# Patient Record
Sex: MALE | Race: WHITE | NOT HISPANIC OR LATINO | Employment: OTHER | URBAN - METROPOLITAN AREA
[De-identification: names, ages, dates, MRNs, and addresses within clinical notes are randomized per-mention and may not be internally consistent; named-entity substitution may affect disease eponyms.]

---

## 2019-10-08 RX ORDER — DULOXETIN HYDROCHLORIDE 60 MG/1
60 CAPSULE, DELAYED RELEASE ORAL DAILY
COMMUNITY
End: 2019-10-08 | Stop reason: SDUPTHER

## 2019-10-08 RX ORDER — ALLOPURINOL 100 MG/1
100 TABLET ORAL DAILY
COMMUNITY
End: 2019-10-08 | Stop reason: SDUPTHER

## 2019-10-08 RX ORDER — DULOXETIN HYDROCHLORIDE 60 MG/1
60 CAPSULE, DELAYED RELEASE ORAL DAILY
Qty: 90 CAPSULE | Refills: 0 | Status: SHIPPED | OUTPATIENT
Start: 2019-10-08 | End: 2019-11-15 | Stop reason: SDUPTHER

## 2019-10-08 RX ORDER — EPLERENONE 50 MG/1
25 TABLET, FILM COATED ORAL 2 TIMES DAILY
COMMUNITY
End: 2019-10-08 | Stop reason: SDUPTHER

## 2019-10-08 RX ORDER — EPLERENONE 50 MG/1
50 TABLET, FILM COATED ORAL 2 TIMES DAILY
Qty: 180 TABLET | Refills: 0 | Status: SHIPPED | OUTPATIENT
Start: 2019-10-08 | End: 2020-01-15 | Stop reason: SDUPTHER

## 2019-10-08 RX ORDER — ALLOPURINOL 100 MG/1
100 TABLET ORAL DAILY
Qty: 90 TABLET | Refills: 0 | Status: SHIPPED | OUTPATIENT
Start: 2019-10-08 | End: 2020-01-15 | Stop reason: SDUPTHER

## 2019-10-08 NOTE — TELEPHONE ENCOUNTER
----- Message from Marcia Flowers sent at 10/8/2019  3:53 PM CDT -----  Contact: Michael Rashidill eplerone 50 mg, cymbalta, and allopurinol. [facility] [address] in [place] .The patient is out of his medications  pts # [phone] GH

## 2019-11-14 PROBLEM — G47.33 OSA (OBSTRUCTIVE SLEEP APNEA): Status: ACTIVE | Noted: 2019-11-14

## 2019-11-14 PROBLEM — Z12.11 SPECIAL SCREENING FOR MALIGNANT NEOPLASMS, COLON: Status: ACTIVE | Noted: 2019-11-14

## 2019-11-14 PROBLEM — M50.90 CERVICAL DISC DISEASE: Status: ACTIVE | Noted: 2019-11-14

## 2019-11-14 PROBLEM — N40.1 BPH WITH OBSTRUCTION/LOWER URINARY TRACT SYMPTOMS: Status: ACTIVE | Noted: 2019-11-14

## 2019-11-14 PROBLEM — I10 ESSENTIAL HYPERTENSION: Status: ACTIVE | Noted: 2019-11-14

## 2019-11-14 PROBLEM — G60.9 IDIOPATHIC PERIPHERAL NEUROPATHY: Status: ACTIVE | Noted: 2019-11-14

## 2019-11-14 PROBLEM — E78.00 PURE HYPERCHOLESTEROLEMIA: Status: ACTIVE | Noted: 2019-11-14

## 2019-11-14 PROBLEM — N13.8 BPH WITH OBSTRUCTION/LOWER URINARY TRACT SYMPTOMS: Status: ACTIVE | Noted: 2019-11-14

## 2019-11-14 PROBLEM — M51.36 DEGENERATION OF LUMBAR INTERVERTEBRAL DISC: Status: ACTIVE | Noted: 2019-11-14

## 2019-11-15 ENCOUNTER — OFFICE VISIT (OUTPATIENT)
Dept: FAMILY MEDICINE | Facility: CLINIC | Age: 69
End: 2019-11-15
Payer: MEDICARE

## 2019-11-15 VITALS
SYSTOLIC BLOOD PRESSURE: 128 MMHG | DIASTOLIC BLOOD PRESSURE: 72 MMHG | HEART RATE: 68 BPM | WEIGHT: 214 LBS | BODY MASS INDEX: 31.7 KG/M2 | HEIGHT: 69 IN

## 2019-11-15 DIAGNOSIS — H04.123 DRY EYE SYNDROME OF BOTH EYES: ICD-10-CM

## 2019-11-15 DIAGNOSIS — Z23 NEED FOR IMMUNIZATION AGAINST INFLUENZA: ICD-10-CM

## 2019-11-15 DIAGNOSIS — M50.90 CERVICAL DISC DISEASE: ICD-10-CM

## 2019-11-15 DIAGNOSIS — E27.40 HYPOALDOSTERONISM: ICD-10-CM

## 2019-11-15 DIAGNOSIS — G60.9 IDIOPATHIC PERIPHERAL NEUROPATHY: ICD-10-CM

## 2019-11-15 DIAGNOSIS — E78.00 PURE HYPERCHOLESTEROLEMIA: ICD-10-CM

## 2019-11-15 DIAGNOSIS — G47.33 OSA (OBSTRUCTIVE SLEEP APNEA): ICD-10-CM

## 2019-11-15 DIAGNOSIS — Z12.11 SPECIAL SCREENING FOR MALIGNANT NEOPLASMS, COLON: ICD-10-CM

## 2019-11-15 DIAGNOSIS — I10 ESSENTIAL HYPERTENSION: ICD-10-CM

## 2019-11-15 DIAGNOSIS — J30.1 NON-SEASONAL ALLERGIC RHINITIS DUE TO POLLEN: ICD-10-CM

## 2019-11-15 DIAGNOSIS — M51.36 DEGENERATION OF LUMBAR INTERVERTEBRAL DISC: ICD-10-CM

## 2019-11-15 DIAGNOSIS — E03.9 ACQUIRED HYPOTHYROIDISM: ICD-10-CM

## 2019-11-15 DIAGNOSIS — N40.1 BPH WITH OBSTRUCTION/LOWER URINARY TRACT SYMPTOMS: ICD-10-CM

## 2019-11-15 DIAGNOSIS — N13.8 BPH WITH OBSTRUCTION/LOWER URINARY TRACT SYMPTOMS: ICD-10-CM

## 2019-11-15 DIAGNOSIS — S06.5X0S: Primary | ICD-10-CM

## 2019-11-15 PROBLEM — S06.5XAA SUBDURAL HEMATOMA WITHOUT COMA: Status: ACTIVE | Noted: 2019-11-15

## 2019-11-15 PROCEDURE — 99214 OFFICE O/P EST MOD 30 MIN: CPT | Mod: 25,S$GLB,, | Performed by: FAMILY MEDICINE

## 2019-11-15 PROCEDURE — G0008 FLU VACCINE - HIGH DOSE (65+) PRESERVATIVE FREE IM: ICD-10-PCS | Mod: S$GLB,,, | Performed by: FAMILY MEDICINE

## 2019-11-15 PROCEDURE — G0008 ADMIN INFLUENZA VIRUS VAC: HCPCS | Mod: S$GLB,,, | Performed by: FAMILY MEDICINE

## 2019-11-15 PROCEDURE — 90662 IIV NO PRSV INCREASED AG IM: CPT | Mod: S$GLB,,, | Performed by: FAMILY MEDICINE

## 2019-11-15 PROCEDURE — 90662 FLU VACCINE - HIGH DOSE (65+) PRESERVATIVE FREE IM: ICD-10-PCS | Mod: S$GLB,,, | Performed by: FAMILY MEDICINE

## 2019-11-15 PROCEDURE — 99214 PR OFFICE/OUTPT VISIT, EST, LEVL IV, 30-39 MIN: ICD-10-PCS | Mod: 25,S$GLB,, | Performed by: FAMILY MEDICINE

## 2019-11-15 RX ORDER — LEVOTHYROXINE SODIUM 25 UG/1
25 TABLET ORAL DAILY
Qty: 90 TABLET | Refills: 1 | Status: SHIPPED | OUTPATIENT
Start: 2019-11-15 | End: 2020-01-15 | Stop reason: SDUPTHER

## 2019-11-15 RX ORDER — FINASTERIDE 5 MG/1
5 TABLET, FILM COATED ORAL DAILY
COMMUNITY
End: 2019-11-15 | Stop reason: SDUPTHER

## 2019-11-15 RX ORDER — POTASSIUM CHLORIDE 750 MG/1
10 TABLET, EXTENDED RELEASE ORAL 2 TIMES DAILY
COMMUNITY
End: 2020-01-10 | Stop reason: SDUPTHER

## 2019-11-15 RX ORDER — LUBIPROSTONE 24 UG/1
24 CAPSULE ORAL 2 TIMES DAILY WITH MEALS
COMMUNITY
End: 2020-01-01

## 2019-11-15 RX ORDER — LEVOTHYROXINE SODIUM 25 UG/1
25 TABLET ORAL DAILY
COMMUNITY
End: 2019-11-15 | Stop reason: SDUPTHER

## 2019-11-15 RX ORDER — LISINOPRIL AND HYDROCHLOROTHIAZIDE 20; 25 MG/1; MG/1
1 TABLET ORAL DAILY
COMMUNITY
End: 2020-01-15 | Stop reason: SDUPTHER

## 2019-11-15 RX ORDER — CYCLOSPORINE 0.5 MG/ML
1 EMULSION OPHTHALMIC 2 TIMES DAILY
COMMUNITY
End: 2019-11-15 | Stop reason: SDUPTHER

## 2019-11-15 RX ORDER — FLUTICASONE PROPIONATE 50 MCG
1 SPRAY, SUSPENSION (ML) NASAL DAILY
Qty: 3 BOTTLE | Refills: 2 | Status: SHIPPED | OUTPATIENT
Start: 2019-11-15 | End: 2020-01-01 | Stop reason: SDUPTHER

## 2019-11-15 RX ORDER — CYCLOSPORINE 0.5 MG/ML
1 EMULSION OPHTHALMIC 2 TIMES DAILY
Qty: 30 EACH | Refills: 5 | Status: SHIPPED | OUTPATIENT
Start: 2019-11-15 | End: 2020-01-15 | Stop reason: SDUPTHER

## 2019-11-15 RX ORDER — EZETIMIBE 10 MG/1
10 TABLET ORAL DAILY
Qty: 90 TABLET | Refills: 1 | Status: SHIPPED | OUTPATIENT
Start: 2019-11-15 | End: 2020-01-15 | Stop reason: SDUPTHER

## 2019-11-15 RX ORDER — EZETIMIBE 10 MG/1
10 TABLET ORAL DAILY
COMMUNITY
End: 2019-11-15 | Stop reason: SDUPTHER

## 2019-11-15 RX ORDER — HYDRALAZINE HYDROCHLORIDE 50 MG/1
50 TABLET, FILM COATED ORAL 2 TIMES DAILY
COMMUNITY
End: 2020-01-15 | Stop reason: SDUPTHER

## 2019-11-15 RX ORDER — FINASTERIDE 5 MG/1
5 TABLET, FILM COATED ORAL DAILY
Qty: 90 TABLET | Refills: 1 | Status: SHIPPED | OUTPATIENT
Start: 2019-11-15 | End: 2020-05-29 | Stop reason: SDUPTHER

## 2019-11-15 RX ORDER — FLUTICASONE PROPIONATE 50 MCG
1 SPRAY, SUSPENSION (ML) NASAL DAILY
COMMUNITY
End: 2019-11-15 | Stop reason: SDUPTHER

## 2019-11-15 RX ORDER — INDOMETHACIN 25 MG/1
25 CAPSULE ORAL 2 TIMES DAILY PRN
COMMUNITY
End: 2020-01-01 | Stop reason: SDUPTHER

## 2019-11-15 RX ORDER — CLINDAMYCIN HYDROCHLORIDE 300 MG/1
300 CAPSULE ORAL 3 TIMES DAILY
COMMUNITY
End: 2020-01-01

## 2019-11-15 RX ORDER — DULOXETIN HYDROCHLORIDE 60 MG/1
60 CAPSULE, DELAYED RELEASE ORAL DAILY
Qty: 90 CAPSULE | Refills: 0 | Status: SHIPPED | OUTPATIENT
Start: 2019-11-15 | End: 2020-01-15 | Stop reason: SDUPTHER
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
## 2019-12-04 ENCOUNTER — TELEPHONE (OUTPATIENT)
Dept: FAMILY MEDICINE | Facility: CLINIC | Age: 69
End: 2019-12-04

## 2019-12-04 NOTE — TELEPHONE ENCOUNTER
----- Message from Marcia Flowers sent at 12/4/2019  3:01 PM CST -----  Contact: [facility] WIFE   Dr. Manriquez the patients pain  Told the patient to have his PCP fill out his handi cap form. pts # [phone] GH

## 2020-01-01 ENCOUNTER — INFUSION (OUTPATIENT)
Dept: INFUSION THERAPY | Facility: HOSPITAL | Age: 70
End: 2020-01-01
Attending: INTERNAL MEDICINE
Payer: MEDICARE

## 2020-01-01 ENCOUNTER — TELEPHONE (OUTPATIENT)
Dept: HEMATOLOGY/ONCOLOGY | Facility: CLINIC | Age: 70
End: 2020-01-01

## 2020-01-01 ENCOUNTER — OFFICE VISIT (OUTPATIENT)
Dept: HEMATOLOGY/ONCOLOGY | Facility: CLINIC | Age: 70
End: 2020-01-01
Payer: MEDICARE

## 2020-01-01 ENCOUNTER — HOSPITAL ENCOUNTER (OUTPATIENT)
Dept: RADIOLOGY | Facility: HOSPITAL | Age: 70
Discharge: HOME | End: 2020-10-05
Attending: NURSE PRACTITIONER
Payer: MEDICARE

## 2020-01-01 ENCOUNTER — HOSPITAL ENCOUNTER (OUTPATIENT)
Facility: HOSPITAL | Age: 70
Discharge: HOME | End: 2020-10-12
Attending: INTERNAL MEDICINE | Admitting: INTERNAL MEDICINE
Payer: MEDICARE

## 2020-01-01 ENCOUNTER — OFFICE VISIT (OUTPATIENT)
Dept: RADIATION ONCOLOGY | Facility: CLINIC | Age: 70
End: 2020-01-01
Payer: MEDICARE

## 2020-01-01 ENCOUNTER — TELEPHONE (OUTPATIENT)
Dept: SURGERY | Facility: CLINIC | Age: 70
End: 2020-01-01

## 2020-01-01 ENCOUNTER — APPOINTMENT (OUTPATIENT)
Dept: INFUSION THERAPY | Facility: HOSPITAL | Age: 70
End: 2020-01-01
Attending: NURSE PRACTITIONER
Payer: MEDICARE

## 2020-01-01 ENCOUNTER — OFFICE VISIT (OUTPATIENT)
Dept: FAMILY MEDICINE | Facility: CLINIC | Age: 70
End: 2020-01-01
Payer: MEDICARE

## 2020-01-01 ENCOUNTER — ANESTHESIA EVENT (OUTPATIENT)
Dept: SURGERY | Facility: HOSPITAL | Age: 70
End: 2020-01-01
Payer: MEDICARE

## 2020-01-01 ENCOUNTER — TELEPHONE (OUTPATIENT)
Dept: INFUSION THERAPY | Facility: HOSPITAL | Age: 70
End: 2020-01-01

## 2020-01-01 ENCOUNTER — HOSPITAL ENCOUNTER (OUTPATIENT)
Dept: RADIOLOGY | Facility: HOSPITAL | Age: 70
Discharge: HOME | End: 2020-10-08
Attending: NURSE PRACTITIONER
Payer: MEDICARE

## 2020-01-01 ENCOUNTER — PATIENT MESSAGE (OUTPATIENT)
Dept: HEMATOLOGY/ONCOLOGY | Facility: CLINIC | Age: 70
End: 2020-01-01

## 2020-01-01 ENCOUNTER — SOCIAL WORK (OUTPATIENT)
Dept: HEMATOLOGY/ONCOLOGY | Facility: CLINIC | Age: 70
End: 2020-01-01

## 2020-01-01 ENCOUNTER — HOSPITAL ENCOUNTER (OUTPATIENT)
Facility: HOSPITAL | Age: 70
Discharge: HOME | End: 2020-09-17
Attending: INTERNAL MEDICINE | Admitting: INTERNAL MEDICINE
Payer: MEDICARE

## 2020-01-01 ENCOUNTER — DOCUMENTATION ONLY (OUTPATIENT)
Dept: HEMATOLOGY/ONCOLOGY | Facility: CLINIC | Age: 70
End: 2020-01-01

## 2020-01-01 ENCOUNTER — HOSPITAL ENCOUNTER (OUTPATIENT)
Dept: PREADMISSION TESTING | Facility: HOSPITAL | Age: 70
Discharge: HOME | End: 2020-10-09
Attending: INTERNAL MEDICINE
Payer: MEDICARE

## 2020-01-01 ENCOUNTER — ANESTHESIA (OUTPATIENT)
Dept: SURGERY | Facility: HOSPITAL | Age: 70
End: 2020-01-01
Payer: MEDICARE

## 2020-01-01 ENCOUNTER — HOSPITAL ENCOUNTER (OUTPATIENT)
Dept: PREADMISSION TESTING | Facility: HOSPITAL | Age: 70
Discharge: HOME | End: 2020-09-16
Attending: INTERNAL MEDICINE
Payer: MEDICARE

## 2020-01-01 ENCOUNTER — TELEPHONE (OUTPATIENT)
Dept: FAMILY MEDICINE | Facility: CLINIC | Age: 70
End: 2020-01-01

## 2020-01-01 ENCOUNTER — CLINICAL SUPPORT (OUTPATIENT)
Dept: HEMATOLOGY/ONCOLOGY | Facility: CLINIC | Age: 70
End: 2020-01-01
Payer: MEDICARE

## 2020-01-01 ENCOUNTER — INITIAL CONSULT (OUTPATIENT)
Dept: SURGERY | Facility: CLINIC | Age: 70
End: 2020-01-01
Payer: MEDICARE

## 2020-01-01 VITALS
SYSTOLIC BLOOD PRESSURE: 159 MMHG | RESPIRATION RATE: 17 BRPM | HEART RATE: 67 BPM | BODY MASS INDEX: 28.56 KG/M2 | HEIGHT: 69 IN | TEMPERATURE: 98 F | TEMPERATURE: 99 F | WEIGHT: 192.81 LBS | HEART RATE: 87 BPM | SYSTOLIC BLOOD PRESSURE: 138 MMHG | BODY MASS INDEX: 27.22 KG/M2 | DIASTOLIC BLOOD PRESSURE: 77 MMHG | HEIGHT: 69 IN | TEMPERATURE: 98 F | RESPIRATION RATE: 18 BRPM | WEIGHT: 188.31 LBS | WEIGHT: 183.81 LBS | DIASTOLIC BLOOD PRESSURE: 67 MMHG | DIASTOLIC BLOOD PRESSURE: 80 MMHG | SYSTOLIC BLOOD PRESSURE: 130 MMHG | HEIGHT: 69 IN | BODY MASS INDEX: 27.89 KG/M2 | OXYGEN SATURATION: 97 % | HEART RATE: 89 BPM

## 2020-01-01 VITALS
HEART RATE: 89 BPM | DIASTOLIC BLOOD PRESSURE: 92 MMHG | SYSTOLIC BLOOD PRESSURE: 147 MMHG | RESPIRATION RATE: 18 BRPM | OXYGEN SATURATION: 95 % | TEMPERATURE: 97 F

## 2020-01-01 VITALS
DIASTOLIC BLOOD PRESSURE: 86 MMHG | HEART RATE: 75 BPM | WEIGHT: 192.31 LBS | TEMPERATURE: 98 F | BODY MASS INDEX: 28.81 KG/M2 | SYSTOLIC BLOOD PRESSURE: 149 MMHG

## 2020-01-01 VITALS
WEIGHT: 186.5 LBS | HEART RATE: 102 BPM | BODY MASS INDEX: 27.54 KG/M2 | DIASTOLIC BLOOD PRESSURE: 69 MMHG | SYSTOLIC BLOOD PRESSURE: 124 MMHG | TEMPERATURE: 97 F

## 2020-01-01 VITALS
RESPIRATION RATE: 16 BRPM | TEMPERATURE: 98 F | OXYGEN SATURATION: 97 % | WEIGHT: 186.31 LBS | BODY MASS INDEX: 27.51 KG/M2 | HEART RATE: 75 BPM | SYSTOLIC BLOOD PRESSURE: 139 MMHG | DIASTOLIC BLOOD PRESSURE: 79 MMHG

## 2020-01-01 VITALS
TEMPERATURE: 98 F | SYSTOLIC BLOOD PRESSURE: 149 MMHG | OXYGEN SATURATION: 97 % | SYSTOLIC BLOOD PRESSURE: 168 MMHG | RESPIRATION RATE: 19 BRPM | BODY MASS INDEX: 28.06 KG/M2 | HEART RATE: 85 BPM | HEART RATE: 81 BPM | OXYGEN SATURATION: 98 % | DIASTOLIC BLOOD PRESSURE: 80 MMHG | WEIGHT: 190 LBS | WEIGHT: 190 LBS | BODY MASS INDEX: 28.06 KG/M2 | RESPIRATION RATE: 18 BRPM | TEMPERATURE: 99 F | DIASTOLIC BLOOD PRESSURE: 81 MMHG

## 2020-01-01 VITALS
DIASTOLIC BLOOD PRESSURE: 80 MMHG | RESPIRATION RATE: 18 BRPM | HEIGHT: 69 IN | OXYGEN SATURATION: 97 % | SYSTOLIC BLOOD PRESSURE: 138 MMHG | DIASTOLIC BLOOD PRESSURE: 84 MMHG | WEIGHT: 189.63 LBS | BODY MASS INDEX: 28.08 KG/M2 | HEART RATE: 84 BPM | HEART RATE: 76 BPM | BODY MASS INDEX: 29.77 KG/M2 | TEMPERATURE: 97 F | SYSTOLIC BLOOD PRESSURE: 158 MMHG | HEIGHT: 69 IN | WEIGHT: 201 LBS

## 2020-01-01 VITALS
TEMPERATURE: 98 F | BODY MASS INDEX: 28.14 KG/M2 | WEIGHT: 188.5 LBS | HEART RATE: 47 BPM | DIASTOLIC BLOOD PRESSURE: 84 MMHG | WEIGHT: 190 LBS | SYSTOLIC BLOOD PRESSURE: 148 MMHG | HEIGHT: 69 IN | HEIGHT: 69 IN | RESPIRATION RATE: 18 BRPM | TEMPERATURE: 98 F | BODY MASS INDEX: 27.92 KG/M2 | DIASTOLIC BLOOD PRESSURE: 59 MMHG | SYSTOLIC BLOOD PRESSURE: 143 MMHG | HEART RATE: 84 BPM

## 2020-01-01 VITALS
DIASTOLIC BLOOD PRESSURE: 72 MMHG | WEIGHT: 189.63 LBS | RESPIRATION RATE: 18 BRPM | HEART RATE: 98 BPM | BODY MASS INDEX: 28.08 KG/M2 | SYSTOLIC BLOOD PRESSURE: 124 MMHG | OXYGEN SATURATION: 96 % | HEIGHT: 69 IN | TEMPERATURE: 99 F

## 2020-01-01 VITALS
WEIGHT: 192 LBS | RESPIRATION RATE: 18 BRPM | TEMPERATURE: 98 F | OXYGEN SATURATION: 95 % | SYSTOLIC BLOOD PRESSURE: 133 MMHG | DIASTOLIC BLOOD PRESSURE: 76 MMHG | BODY MASS INDEX: 28.77 KG/M2 | HEART RATE: 99 BPM

## 2020-01-01 VITALS
HEIGHT: 69 IN | WEIGHT: 186.31 LBS | WEIGHT: 189.13 LBS | HEART RATE: 74 BPM | TEMPERATURE: 97 F | DIASTOLIC BLOOD PRESSURE: 88 MMHG | OXYGEN SATURATION: 97 % | BODY MASS INDEX: 28.01 KG/M2 | RESPIRATION RATE: 17 BRPM | HEART RATE: 82 BPM | DIASTOLIC BLOOD PRESSURE: 83 MMHG | RESPIRATION RATE: 16 BRPM | TEMPERATURE: 98 F | OXYGEN SATURATION: 97 % | WEIGHT: 176.69 LBS | HEART RATE: 75 BPM | SYSTOLIC BLOOD PRESSURE: 179 MMHG | SYSTOLIC BLOOD PRESSURE: 139 MMHG | BODY MASS INDEX: 27.6 KG/M2 | DIASTOLIC BLOOD PRESSURE: 79 MMHG | RESPIRATION RATE: 17 BRPM | BODY MASS INDEX: 26.17 KG/M2 | TEMPERATURE: 98 F | HEIGHT: 69 IN | HEIGHT: 69 IN | SYSTOLIC BLOOD PRESSURE: 159 MMHG

## 2020-01-01 VITALS
WEIGHT: 191.31 LBS | SYSTOLIC BLOOD PRESSURE: 124 MMHG | TEMPERATURE: 97 F | DIASTOLIC BLOOD PRESSURE: 78 MMHG | RESPIRATION RATE: 18 BRPM | BODY MASS INDEX: 29.09 KG/M2 | HEART RATE: 98 BPM

## 2020-01-01 VITALS
HEIGHT: 69 IN | SYSTOLIC BLOOD PRESSURE: 120 MMHG | HEIGHT: 69 IN | HEART RATE: 103 BPM | WEIGHT: 182.81 LBS | RESPIRATION RATE: 20 BRPM | WEIGHT: 182.44 LBS | OXYGEN SATURATION: 96 % | DIASTOLIC BLOOD PRESSURE: 62 MMHG | DIASTOLIC BLOOD PRESSURE: 66 MMHG | BODY MASS INDEX: 27.02 KG/M2 | HEART RATE: 89 BPM | TEMPERATURE: 98 F | RESPIRATION RATE: 18 BRPM | SYSTOLIC BLOOD PRESSURE: 120 MMHG | TEMPERATURE: 97 F | BODY MASS INDEX: 27.08 KG/M2

## 2020-01-01 VITALS
OXYGEN SATURATION: 97 % | SYSTOLIC BLOOD PRESSURE: 163 MMHG | WEIGHT: 176.81 LBS | BODY MASS INDEX: 26.11 KG/M2 | RESPIRATION RATE: 18 BRPM | TEMPERATURE: 97 F | DIASTOLIC BLOOD PRESSURE: 78 MMHG | HEART RATE: 91 BPM

## 2020-01-01 VITALS
BODY MASS INDEX: 27.94 KG/M2 | TEMPERATURE: 98 F | HEART RATE: 89 BPM | SYSTOLIC BLOOD PRESSURE: 147 MMHG | DIASTOLIC BLOOD PRESSURE: 85 MMHG | WEIGHT: 186.5 LBS

## 2020-01-01 VITALS
BODY MASS INDEX: 27.89 KG/M2 | HEIGHT: 69 IN | SYSTOLIC BLOOD PRESSURE: 166 MMHG | DIASTOLIC BLOOD PRESSURE: 70 MMHG | TEMPERATURE: 98 F | WEIGHT: 188.31 LBS | RESPIRATION RATE: 17 BRPM | HEART RATE: 66 BPM

## 2020-01-01 VITALS
HEART RATE: 88 BPM | WEIGHT: 183.88 LBS | BODY MASS INDEX: 27.15 KG/M2 | TEMPERATURE: 98 F | DIASTOLIC BLOOD PRESSURE: 76 MMHG | RESPIRATION RATE: 18 BRPM | SYSTOLIC BLOOD PRESSURE: 142 MMHG

## 2020-01-01 VITALS
HEART RATE: 86 BPM | RESPIRATION RATE: 18 BRPM | SYSTOLIC BLOOD PRESSURE: 133 MMHG | BODY MASS INDEX: 27.74 KG/M2 | WEIGHT: 187.81 LBS | DIASTOLIC BLOOD PRESSURE: 72 MMHG | TEMPERATURE: 98 F

## 2020-01-01 VITALS
TEMPERATURE: 98 F | WEIGHT: 188.5 LBS | BODY MASS INDEX: 28.24 KG/M2 | DIASTOLIC BLOOD PRESSURE: 81 MMHG | SYSTOLIC BLOOD PRESSURE: 125 MMHG | RESPIRATION RATE: 19 BRPM | HEART RATE: 99 BPM

## 2020-01-01 VITALS
TEMPERATURE: 99 F | HEIGHT: 68 IN | SYSTOLIC BLOOD PRESSURE: 145 MMHG | BODY MASS INDEX: 28.79 KG/M2 | OXYGEN SATURATION: 97 % | DIASTOLIC BLOOD PRESSURE: 80 MMHG | HEART RATE: 67 BPM | RESPIRATION RATE: 18 BRPM | WEIGHT: 190 LBS

## 2020-01-01 VITALS
OXYGEN SATURATION: 95 % | WEIGHT: 184.5 LBS | RESPIRATION RATE: 20 BRPM | BODY MASS INDEX: 27.4 KG/M2 | OXYGEN SATURATION: 95 % | HEIGHT: 69 IN | TEMPERATURE: 98 F | DIASTOLIC BLOOD PRESSURE: 66 MMHG | BODY MASS INDEX: 27.25 KG/M2 | DIASTOLIC BLOOD PRESSURE: 69 MMHG | TEMPERATURE: 98 F | SYSTOLIC BLOOD PRESSURE: 117 MMHG | RESPIRATION RATE: 18 BRPM | SYSTOLIC BLOOD PRESSURE: 115 MMHG | HEART RATE: 100 BPM | HEART RATE: 95 BPM | WEIGHT: 185 LBS

## 2020-01-01 DIAGNOSIS — N28.9 KIDNEY DISEASE WITH FLUID RETENTION: ICD-10-CM

## 2020-01-01 DIAGNOSIS — D70.9 NEUTROPENIA, UNSPECIFIED TYPE: Primary | ICD-10-CM

## 2020-01-01 DIAGNOSIS — C78.7 METASTASIS TO LIVER: Primary | ICD-10-CM

## 2020-01-01 DIAGNOSIS — C25.0 MALIGNANT NEOPLASM OF HEAD OF PANCREAS: ICD-10-CM

## 2020-01-01 DIAGNOSIS — Z23 NEED FOR INFLUENZA VACCINATION: ICD-10-CM

## 2020-01-01 DIAGNOSIS — C78.7 METASTASIS TO LIVER: ICD-10-CM

## 2020-01-01 DIAGNOSIS — T45.1X5A CHEMOTHERAPY INDUCED NEUTROPENIA: ICD-10-CM

## 2020-01-01 DIAGNOSIS — T45.1X5A ANEMIA DUE TO ANTINEOPLASTIC CHEMOTHERAPY: ICD-10-CM

## 2020-01-01 DIAGNOSIS — I10 ESSENTIAL HYPERTENSION: ICD-10-CM

## 2020-01-01 DIAGNOSIS — E26.9 HYPERALDOSTERONISM: ICD-10-CM

## 2020-01-01 DIAGNOSIS — C25.1 PRIMARY ADENOCARCINOMA OF BODY OF PANCREAS: ICD-10-CM

## 2020-01-01 DIAGNOSIS — K59.03 DRUG-INDUCED CONSTIPATION: ICD-10-CM

## 2020-01-01 DIAGNOSIS — N13.8 BPH WITH OBSTRUCTION/LOWER URINARY TRACT SYMPTOMS: ICD-10-CM

## 2020-01-01 DIAGNOSIS — E78.00 PURE HYPERCHOLESTEROLEMIA: ICD-10-CM

## 2020-01-01 DIAGNOSIS — C25.1 MALIGNANT NEOPLASM OF BODY OF PANCREAS: Primary | ICD-10-CM

## 2020-01-01 DIAGNOSIS — D64.81 ANEMIA DUE TO ANTINEOPLASTIC CHEMOTHERAPY: ICD-10-CM

## 2020-01-01 DIAGNOSIS — Z86.79 HISTORY OF SUBDURAL HEMATOMA: ICD-10-CM

## 2020-01-01 DIAGNOSIS — C25.1 PRIMARY ADENOCARCINOMA OF BODY OF PANCREAS: Primary | ICD-10-CM

## 2020-01-01 DIAGNOSIS — Z01.818 OTHER SPECIFIED PRE-OPERATIVE EXAMINATION: ICD-10-CM

## 2020-01-01 DIAGNOSIS — C25.2 MALIGNANT NEOPLASM OF TAIL OF PANCREAS: Primary | ICD-10-CM

## 2020-01-01 DIAGNOSIS — K86.89 PANCREATIC MASS: ICD-10-CM

## 2020-01-01 DIAGNOSIS — M10.9 GOUT, UNSPECIFIED CAUSE, UNSPECIFIED CHRONICITY, UNSPECIFIED SITE: ICD-10-CM

## 2020-01-01 DIAGNOSIS — K86.89 PANCREATIC MASS: Primary | ICD-10-CM

## 2020-01-01 DIAGNOSIS — N40.1 BPH WITH OBSTRUCTION/LOWER URINARY TRACT SYMPTOMS: ICD-10-CM

## 2020-01-01 DIAGNOSIS — J30.1 NON-SEASONAL ALLERGIC RHINITIS DUE TO POLLEN: ICD-10-CM

## 2020-01-01 DIAGNOSIS — C25.1 MALIGNANT NEOPLASM OF BODY OF PANCREAS: ICD-10-CM

## 2020-01-01 DIAGNOSIS — C25.9 PANCREATIC ADENOCARCINOMA: ICD-10-CM

## 2020-01-01 DIAGNOSIS — C26.9 MALIGNANT NEOPLASM OF ILL-DEFINED SITES WITHIN THE DIGESTIVE SYSTEM: ICD-10-CM

## 2020-01-01 DIAGNOSIS — M51.36 DEGENERATION OF LUMBAR INTERVERTEBRAL DISC: ICD-10-CM

## 2020-01-01 DIAGNOSIS — E27.49 HYPORENINEMIC HYPOALDOSTERONISM: ICD-10-CM

## 2020-01-01 DIAGNOSIS — D70.1 CHEMOTHERAPY INDUCED NEUTROPENIA: ICD-10-CM

## 2020-01-01 DIAGNOSIS — M1A.09X0 IDIOPATHIC CHRONIC GOUT OF MULTIPLE SITES WITHOUT TOPHUS: ICD-10-CM

## 2020-01-01 DIAGNOSIS — E03.9 ACQUIRED HYPOTHYROIDISM: ICD-10-CM

## 2020-01-01 DIAGNOSIS — C25.2 MALIGNANT NEOPLASM OF TAIL OF PANCREAS: ICD-10-CM

## 2020-01-01 DIAGNOSIS — R11.0 NAUSEA: ICD-10-CM

## 2020-01-01 DIAGNOSIS — C25.9 MALIGNANT NEOPLASM OF PANCREAS: ICD-10-CM

## 2020-01-01 DIAGNOSIS — Z01.818 PRE-OP TESTING: Primary | ICD-10-CM

## 2020-01-01 DIAGNOSIS — M62.838 MUSCLE SPASMS OF BOTH LOWER EXTREMITIES: ICD-10-CM

## 2020-01-01 DIAGNOSIS — Z03.818 ENCOUNTER FOR OBSERVATION FOR SUSPECTED EXPOSURE TO OTHER BIOLOGICAL AGENTS RULED OUT: Primary | ICD-10-CM

## 2020-01-01 DIAGNOSIS — G60.9 IDIOPATHIC PERIPHERAL NEUROPATHY: ICD-10-CM

## 2020-01-01 DIAGNOSIS — G47.33 OSA ON CPAP: ICD-10-CM

## 2020-01-01 DIAGNOSIS — C25.9 PANCREATIC ADENOCARCINOMA: Primary | ICD-10-CM

## 2020-01-01 LAB
ALBUMIN SERPL-MCNC: 3.7 G/DL (ref 3.6–5.1)
ALBUMIN/GLOB SERPL: 1.2 (CALC) (ref 1–2.5)
ALP SERPL-CCNC: 62 U/L (ref 35–144)
ALT SERPL-CCNC: 55 U/L (ref 9–46)
AST SERPL-CCNC: 38 U/L (ref 10–35)
BASOPHILS # BLD AUTO: 80 CELLS/UL (ref 0–200)
BASOPHILS NFR BLD AUTO: 1 %
BILIRUB SERPL-MCNC: 0.3 MG/DL (ref 0.2–1.2)
BUN SERPL-MCNC: 12 MG/DL (ref 7–25)
BUN/CREAT SERPL: ABNORMAL (CALC) (ref 6–22)
CALCIUM SERPL-MCNC: 9.2 MG/DL (ref 8.6–10.3)
CANCER AG19-9 SERPL-ACNC: ABNORMAL U/ML
CHLORIDE SERPL-SCNC: 101 MMOL/L (ref 98–110)
CO2 SERPL-SCNC: 31 MMOL/L (ref 20–32)
COPY RECEIVED FROM: NORMAL
CREAT SERPL-MCNC: 0.84 MG/DL (ref 0.7–1.18)
EOSINOPHIL # BLD AUTO: 128 CELLS/UL (ref 15–500)
EOSINOPHIL NFR BLD AUTO: 1.6 %
ERYTHROCYTE [DISTWIDTH] IN BLOOD BY AUTOMATED COUNT: 12.5 % (ref 11–15)
GFRSERPLBLD MDRD-ARVRAT: 88 ML/MIN/1.73M2
GLOBULIN SER CALC-MCNC: 3.1 G/DL (CALC) (ref 1.9–3.7)
GLUCOSE SERPL-MCNC: 127 MG/DL (ref 65–139)
HCT VFR BLD AUTO: 37.6 % (ref 38.5–50)
HGB BLD-MCNC: 12.5 G/DL (ref 13.2–17.1)
LYMPHOCYTES # BLD AUTO: 1304 CELLS/UL (ref 850–3900)
LYMPHOCYTES NFR BLD AUTO: 16.3 %
MCH RBC QN AUTO: 27.7 PG (ref 27–33)
MCHC RBC AUTO-ENTMCNC: 33.2 G/DL (ref 32–36)
MCV RBC AUTO: 83.4 FL (ref 80–100)
MONOCYTES # BLD AUTO: 544 CELLS/UL (ref 200–950)
MONOCYTES NFR BLD AUTO: 6.8 %
NEUTROPHILS # BLD AUTO: 5944 CELLS/UL (ref 1500–7800)
NEUTROPHILS NFR BLD AUTO: 74.3 %
PLATELET # BLD AUTO: 364 THOUSAND/UL (ref 140–400)
PMV BLD REES-ECKER: 9.6 FL (ref 7.5–12.5)
POTASSIUM SERPL-SCNC: 3.8 MMOL/L (ref 3.5–5.3)
PROT SERPL-MCNC: 6.8 G/DL (ref 6.1–8.1)
RBC # BLD AUTO: 4.51 MILLION/UL (ref 4.2–5.8)
SARS-COV-2 RNA RESP QL NAA+PROBE: NOT DETECTED
SODIUM SERPL-SCNC: 141 MMOL/L (ref 135–146)
WBC # BLD AUTO: 8 THOUSAND/UL (ref 3.8–10.8)

## 2020-01-01 PROCEDURE — 27000671 HC TUBING MICROBORE EXT: Performed by: NURSE ANESTHETIST, CERTIFIED REGISTERED

## 2020-01-01 PROCEDURE — 43238 EGD US FINE NEEDLE BX/ASPIR: CPT | Performed by: INTERNAL MEDICINE

## 2020-01-01 PROCEDURE — 63600175 PHARM REV CODE 636 W HCPCS: Performed by: INTERNAL MEDICINE

## 2020-01-01 PROCEDURE — 99999 PR PBB SHADOW E&M-EST. PATIENT-LVL V: ICD-10-PCS | Mod: PBBFAC,,, | Performed by: NURSE PRACTITIONER

## 2020-01-01 PROCEDURE — 63600175 PHARM REV CODE 636 W HCPCS: Mod: JG | Performed by: INTERNAL MEDICINE

## 2020-01-01 PROCEDURE — 99214 OFFICE O/P EST MOD 30 MIN: CPT | Mod: S$GLB,,, | Performed by: INTERNAL MEDICINE

## 2020-01-01 PROCEDURE — 99214 PR OFFICE/OUTPT VISIT, EST, LEVL IV, 30-39 MIN: ICD-10-PCS | Mod: S$GLB,,, | Performed by: INTERNAL MEDICINE

## 2020-01-01 PROCEDURE — 96413 CHEMO IV INFUSION 1 HR: CPT

## 2020-01-01 PROCEDURE — 93005 ELECTROCARDIOGRAM TRACING: CPT | Performed by: INTERNAL MEDICINE

## 2020-01-01 PROCEDURE — 96376 TX/PRO/DX INJ SAME DRUG ADON: CPT

## 2020-01-01 PROCEDURE — 96375 TX/PRO/DX INJ NEW DRUG ADDON: CPT

## 2020-01-01 PROCEDURE — U0003 INFECTIOUS AGENT DETECTION BY NUCLEIC ACID (DNA OR RNA); SEVERE ACUTE RESPIRATORY SYNDROME CORONAVIRUS 2 (SARS-COV-2) (CORONAVIRUS DISEASE [COVID-19]), AMPLIFIED PROBE TECHNIQUE, MAKING USE OF HIGH THROUGHPUT TECHNOLOGIES AS DESCRIBED BY CMS-2020-01-R: HCPCS

## 2020-01-01 PROCEDURE — 96417 CHEMO IV INFUS EACH ADDL SEQ: CPT

## 2020-01-01 PROCEDURE — 74177 CT CHEST ABDOMEN PELVIS WITH CONTRAST (XPD): ICD-10-PCS | Mod: 26,,, | Performed by: RADIOLOGY

## 2020-01-01 PROCEDURE — A9585 GADOBUTROL INJECTION: HCPCS | Performed by: NURSE PRACTITIONER

## 2020-01-01 PROCEDURE — 99204 PR OFFICE/OUTPT VISIT, NEW, LEVL IV, 45-59 MIN: ICD-10-PCS | Mod: S$PBB,,, | Performed by: NURSE PRACTITIONER

## 2020-01-01 PROCEDURE — 99213 PR OFFICE/OUTPT VISIT, EST, LEVL III, 20-29 MIN: ICD-10-PCS | Mod: S$GLB,,, | Performed by: INTERNAL MEDICINE

## 2020-01-01 PROCEDURE — 99213 OFFICE O/P EST LOW 20 MIN: CPT | Mod: S$GLB,,, | Performed by: INTERNAL MEDICINE

## 2020-01-01 PROCEDURE — 25000003 PHARM REV CODE 250: Performed by: INTERNAL MEDICINE

## 2020-01-01 PROCEDURE — 63600175 PHARM REV CODE 636 W HCPCS: Mod: JW,JG | Performed by: INTERNAL MEDICINE

## 2020-01-01 PROCEDURE — 27202103: Performed by: NURSE ANESTHETIST, CERTIFIED REGISTERED

## 2020-01-01 PROCEDURE — 99215 OFFICE O/P EST HI 40 MIN: CPT | Mod: S$GLB,,, | Performed by: INTERNAL MEDICINE

## 2020-01-01 PROCEDURE — 96367 TX/PROPH/DG ADDL SEQ IV INF: CPT

## 2020-01-01 PROCEDURE — 99215 OFFICE O/P EST HI 40 MIN: CPT | Mod: PBBFAC,25 | Performed by: NURSE PRACTITIONER

## 2020-01-01 PROCEDURE — A4216 STERILE WATER/SALINE, 10 ML: HCPCS | Performed by: INTERNAL MEDICINE

## 2020-01-01 PROCEDURE — 96372 THER/PROPH/DIAG INJ SC/IM: CPT

## 2020-01-01 PROCEDURE — 37000009 HC ANESTHESIA EA ADD 15 MINS: Performed by: INTERNAL MEDICINE

## 2020-01-01 PROCEDURE — 93010 ELECTROCARDIOGRAM REPORT: CPT | Mod: ,,, | Performed by: INTERNAL MEDICINE

## 2020-01-01 PROCEDURE — 99215 PR OFFICE/OUTPT VISIT, EST, LEVL V, 40-54 MIN: ICD-10-PCS | Mod: S$GLB,,, | Performed by: INTERNAL MEDICINE

## 2020-01-01 PROCEDURE — G0008 ADMIN INFLUENZA VIRUS VAC: HCPCS | Mod: S$GLB,,, | Performed by: FAMILY MEDICINE

## 2020-01-01 PROCEDURE — 99214 OFFICE O/P EST MOD 30 MIN: CPT | Mod: 25,S$GLB,, | Performed by: FAMILY MEDICINE

## 2020-01-01 PROCEDURE — 25000003 PHARM REV CODE 250: Performed by: NURSE ANESTHETIST, CERTIFIED REGISTERED

## 2020-01-01 PROCEDURE — 37000008 HC ANESTHESIA 1ST 15 MINUTES: Performed by: INTERNAL MEDICINE

## 2020-01-01 PROCEDURE — 99215 OFFICE O/P EST HI 40 MIN: CPT | Mod: S$GLB,,, | Performed by: NURSE PRACTITIONER

## 2020-01-01 PROCEDURE — 27100019 HC AMBU BAG ADULT/PED: Performed by: NURSE ANESTHETIST, CERTIFIED REGISTERED

## 2020-01-01 PROCEDURE — 25500020 PHARM REV CODE 255: Performed by: NURSE PRACTITIONER

## 2020-01-01 PROCEDURE — 27202053 HC NEEDLE BIOPSY EUS: Performed by: INTERNAL MEDICINE

## 2020-01-01 PROCEDURE — 74177 CT ABD & PELVIS W/CONTRAST: CPT | Mod: 26,,, | Performed by: RADIOLOGY

## 2020-01-01 PROCEDURE — 90662 IIV NO PRSV INCREASED AG IM: CPT | Mod: S$GLB,,, | Performed by: FAMILY MEDICINE

## 2020-01-01 PROCEDURE — 63600175 PHARM REV CODE 636 W HCPCS: Performed by: NURSE ANESTHETIST, CERTIFIED REGISTERED

## 2020-01-01 PROCEDURE — 99204 OFFICE O/P NEW MOD 45 MIN: CPT | Mod: S$PBB,,, | Performed by: NURSE PRACTITIONER

## 2020-01-01 PROCEDURE — 71260 CT CHEST ABDOMEN PELVIS WITH CONTRAST (XPD): ICD-10-PCS | Mod: 26,,, | Performed by: RADIOLOGY

## 2020-01-01 PROCEDURE — 63600175 PHARM REV CODE 636 W HCPCS: Performed by: NURSE PRACTITIONER

## 2020-01-01 PROCEDURE — 27000671 HC TUBING MICROBORE EXT: Performed by: STUDENT IN AN ORGANIZED HEALTH CARE EDUCATION/TRAINING PROGRAM

## 2020-01-01 PROCEDURE — 74177 CT ABD & PELVIS W/CONTRAST: CPT | Mod: TC

## 2020-01-01 PROCEDURE — 99205 OFFICE O/P NEW HI 60 MIN: CPT | Mod: S$GLB,,, | Performed by: RADIOLOGY

## 2020-01-01 PROCEDURE — 71260 CT THORAX DX C+: CPT | Mod: 26,,, | Performed by: RADIOLOGY

## 2020-01-01 PROCEDURE — G0008 FLU VACCINE - QUADRIVALENT - HIGH DOSE (65+) PRESERVATIVE FREE IM: ICD-10-PCS | Mod: S$GLB,,, | Performed by: FAMILY MEDICINE

## 2020-01-01 PROCEDURE — 99999 PR PBB SHADOW E&M-EST. PATIENT-LVL V: CPT | Mod: PBBFAC,,, | Performed by: NURSE PRACTITIONER

## 2020-01-01 PROCEDURE — 99214 PR OFFICE/OUTPT VISIT, EST, LEVL IV, 30-39 MIN: ICD-10-PCS | Mod: 25,S$GLB,, | Performed by: FAMILY MEDICINE

## 2020-01-01 PROCEDURE — 27000675 HC TUBING MICRODRIP: Performed by: NURSE ANESTHETIST, CERTIFIED REGISTERED

## 2020-01-01 PROCEDURE — 88307 TISSUE EXAM BY PATHOLOGIST: CPT | Mod: TC

## 2020-01-01 PROCEDURE — 43253 EGD US TRANSMURAL INJXN/MARK: CPT | Performed by: INTERNAL MEDICINE

## 2020-01-01 PROCEDURE — 90662 FLU VACCINE - QUADRIVALENT - HIGH DOSE (65+) PRESERVATIVE FREE IM: ICD-10-PCS | Mod: S$GLB,,, | Performed by: FAMILY MEDICINE

## 2020-01-01 PROCEDURE — 99205 PR OFFICE/OUTPT VISIT, NEW, LEVL V, 60-74 MIN: ICD-10-PCS | Mod: S$GLB,,, | Performed by: RADIOLOGY

## 2020-01-01 PROCEDURE — 93010 EKG 12-LEAD: ICD-10-PCS | Mod: ,,, | Performed by: INTERNAL MEDICINE

## 2020-01-01 PROCEDURE — 99215 PR OFFICE/OUTPT VISIT, EST, LEVL V, 40-54 MIN: ICD-10-PCS | Mod: S$GLB,,, | Performed by: NURSE PRACTITIONER

## 2020-01-01 PROCEDURE — 99214 OFFICE O/P EST MOD 30 MIN: CPT | Mod: S$GLB,,, | Performed by: FAMILY MEDICINE

## 2020-01-01 PROCEDURE — 99214 PR OFFICE/OUTPT VISIT, EST, LEVL IV, 30-39 MIN: ICD-10-PCS | Mod: S$GLB,,, | Performed by: FAMILY MEDICINE

## 2020-01-01 PROCEDURE — 25000003 PHARM REV CODE 250: Performed by: NURSE PRACTITIONER

## 2020-01-01 PROCEDURE — 74183 MRI ABD W/O CNTR FLWD CNTR: CPT | Mod: TC

## 2020-01-01 RX ORDER — SODIUM CHLORIDE 0.9 % (FLUSH) 0.9 %
10 SYRINGE (ML) INJECTION
Status: DISCONTINUED | OUTPATIENT
Start: 2020-01-01 | End: 2020-01-01 | Stop reason: HOSPADM

## 2020-01-01 RX ORDER — FAMOTIDINE 10 MG/ML
20 INJECTION INTRAVENOUS
Status: COMPLETED | OUTPATIENT
Start: 2020-01-01 | End: 2020-01-01

## 2020-01-01 RX ORDER — ACETAMINOPHEN 325 MG/1
650 TABLET ORAL
Status: COMPLETED | OUTPATIENT
Start: 2020-01-01 | End: 2020-01-01

## 2020-01-01 RX ORDER — FINASTERIDE 5 MG/1
5 TABLET, FILM COATED ORAL DAILY
Qty: 90 TABLET | Refills: 1 | Status: SHIPPED | OUTPATIENT
Start: 2020-01-01 | End: 2021-01-01 | Stop reason: SDUPTHER

## 2020-01-01 RX ORDER — FLUTICASONE PROPIONATE 50 MCG
1 SPRAY, SUSPENSION (ML) NASAL 2 TIMES DAILY
Qty: 48 G | Refills: 3 | Status: SHIPPED | OUTPATIENT
Start: 2020-01-01

## 2020-01-01 RX ORDER — HEPARIN 100 UNIT/ML
500 SYRINGE INTRAVENOUS
Status: CANCELLED | OUTPATIENT
Start: 2020-01-01

## 2020-01-01 RX ORDER — PANTOPRAZOLE SODIUM 40 MG/1
40 TABLET, DELAYED RELEASE ORAL DAILY
Qty: 90 TABLET | Refills: 1 | Status: SHIPPED | OUTPATIENT
Start: 2020-01-01 | End: 2021-01-01 | Stop reason: SDUPTHER

## 2020-01-01 RX ORDER — FAMOTIDINE 10 MG/ML
20 INJECTION INTRAVENOUS
Status: CANCELLED | OUTPATIENT
Start: 2021-01-01

## 2020-01-01 RX ORDER — ACETAMINOPHEN 325 MG/1
650 TABLET ORAL
Status: CANCELLED | OUTPATIENT
Start: 2020-01-01

## 2020-01-01 RX ORDER — DULOXETIN HYDROCHLORIDE 60 MG/1
60 CAPSULE, DELAYED RELEASE ORAL DAILY
Qty: 90 CAPSULE | Refills: 1 | Status: SHIPPED | OUTPATIENT
Start: 2020-01-01 | End: 2020-01-01 | Stop reason: SDUPTHER

## 2020-01-01 RX ORDER — FUROSEMIDE 10 MG/ML
20 INJECTION INTRAMUSCULAR; INTRAVENOUS ONCE
Status: COMPLETED | OUTPATIENT
Start: 2020-01-01 | End: 2020-01-01

## 2020-01-01 RX ORDER — SODIUM CHLORIDE 9 MG/ML
INJECTION, SOLUTION INTRAVENOUS CONTINUOUS
Status: DISCONTINUED | OUTPATIENT
Start: 2020-01-01 | End: 2020-01-01 | Stop reason: HOSPADM

## 2020-01-01 RX ORDER — EPLERENONE 50 MG/1
50 TABLET, FILM COATED ORAL 2 TIMES DAILY
Qty: 180 TABLET | Refills: 1 | Status: SHIPPED | OUTPATIENT
Start: 2020-01-01 | End: 2021-01-01 | Stop reason: SDUPTHER

## 2020-01-01 RX ORDER — FENTANYL 25 UG/1
1 PATCH TRANSDERMAL
Qty: 10 PATCH | Refills: 0 | Status: SHIPPED | OUTPATIENT
Start: 2020-01-01 | End: 2020-01-01

## 2020-01-01 RX ORDER — SODIUM CHLORIDE 0.9 % (FLUSH) 0.9 %
10 SYRINGE (ML) INJECTION
Status: CANCELLED | OUTPATIENT
Start: 2020-01-01

## 2020-01-01 RX ORDER — ONDANSETRON 2 MG/ML
16 INJECTION INTRAMUSCULAR; INTRAVENOUS ONCE
Status: CANCELLED | OUTPATIENT
Start: 2020-01-01

## 2020-01-01 RX ORDER — FENTANYL 25 UG/1
1 PATCH TRANSDERMAL
Qty: 10 PATCH | Refills: 0 | Status: SHIPPED | OUTPATIENT
Start: 2020-01-01 | End: 2020-01-01 | Stop reason: SDUPTHER

## 2020-01-01 RX ORDER — SUCRALFATE 1 G/1
1 TABLET ORAL
Qty: 120 TABLET | Refills: 5 | Status: SHIPPED | OUTPATIENT
Start: 2020-01-01 | End: 2021-01-01

## 2020-01-01 RX ORDER — DEXAMETHASONE SODIUM PHOSPHATE 4 MG/ML
10 INJECTION, SOLUTION INTRA-ARTICULAR; INTRALESIONAL; INTRAMUSCULAR; INTRAVENOUS; SOFT TISSUE ONCE
Status: CANCELLED
Start: 2020-01-01

## 2020-01-01 RX ORDER — OXYCODONE HYDROCHLORIDE 5 MG/1
TABLET ORAL
Qty: 120 TABLET | Refills: 0 | Status: ON HOLD | OUTPATIENT
Start: 2020-01-01 | End: 2021-01-01 | Stop reason: HOSPADM

## 2020-01-01 RX ORDER — FENTANYL CITRATE 50 UG/ML
INJECTION, SOLUTION INTRAMUSCULAR; INTRAVENOUS
Status: COMPLETED | OUTPATIENT
Start: 2020-01-01 | End: 2020-01-01

## 2020-01-01 RX ORDER — DEXAMETHASONE SODIUM PHOSPHATE 4 MG/ML
10 INJECTION, SOLUTION INTRA-ARTICULAR; INTRALESIONAL; INTRAMUSCULAR; INTRAVENOUS; SOFT TISSUE ONCE
Status: CANCELLED
Start: 2021-01-01

## 2020-01-01 RX ORDER — HEPARIN 100 UNIT/ML
500 SYRINGE INTRAVENOUS
Status: DISCONTINUED | OUTPATIENT
Start: 2020-01-01 | End: 2020-01-01 | Stop reason: HOSPADM

## 2020-01-01 RX ORDER — HEPARIN 100 UNIT/ML
500 SYRINGE INTRAVENOUS
Status: CANCELLED | OUTPATIENT
Start: 2021-01-01

## 2020-01-01 RX ORDER — HYDROCODONE BITARTRATE AND ACETAMINOPHEN 10; 325 MG/1; MG/1
1 TABLET ORAL EVERY 6 HOURS PRN
Qty: 120 TABLET | Refills: 0 | Status: SHIPPED | OUTPATIENT
Start: 2020-01-01 | End: 2020-01-01

## 2020-01-01 RX ORDER — BUPIVACAINE HYDROCHLORIDE 2.5 MG/ML
20 INJECTION, SOLUTION EPIDURAL; INFILTRATION; INTRACAUDAL ONCE
Status: DISCONTINUED | OUTPATIENT
Start: 2020-01-01 | End: 2020-01-01 | Stop reason: HOSPADM

## 2020-01-01 RX ORDER — ALLOPURINOL 100 MG/1
100 TABLET ORAL DAILY
Qty: 90 TABLET | Refills: 1 | Status: ON HOLD | OUTPATIENT
Start: 2020-01-01 | End: 2021-01-01 | Stop reason: HOSPADM

## 2020-01-01 RX ORDER — GADOBUTROL 604.72 MG/ML
9 INJECTION INTRAVENOUS
Status: COMPLETED | OUTPATIENT
Start: 2020-01-01 | End: 2020-01-01

## 2020-01-01 RX ORDER — FAMOTIDINE 10 MG/ML
20 INJECTION INTRAVENOUS
Status: CANCELLED | OUTPATIENT
Start: 2020-01-01

## 2020-01-01 RX ORDER — SUCRALFATE 1 G/1
1 TABLET ORAL
COMMUNITY
Start: 2020-01-01 | End: 2020-01-01 | Stop reason: SDUPTHER

## 2020-01-01 RX ORDER — PROPOFOL 10 MG/ML
VIAL (ML) INTRAVENOUS
Status: DISCONTINUED | OUTPATIENT
Start: 2020-01-01 | End: 2020-01-01

## 2020-01-01 RX ORDER — LEVOTHYROXINE SODIUM 25 UG/1
25 TABLET ORAL DAILY
Qty: 90 TABLET | Refills: 1 | Status: SHIPPED | OUTPATIENT
Start: 2020-01-01 | End: 2021-01-01 | Stop reason: SDUPTHER

## 2020-01-01 RX ORDER — FAMOTIDINE 20 MG/1
20 TABLET, FILM COATED ORAL 2 TIMES DAILY
Qty: 180 TABLET | Refills: 1 | Status: SHIPPED | OUTPATIENT
Start: 2020-01-01 | End: 2021-01-01 | Stop reason: SDUPTHER

## 2020-01-01 RX ORDER — ACETAMINOPHEN 325 MG/1
650 TABLET ORAL
Status: CANCELLED | OUTPATIENT
Start: 2021-01-01

## 2020-01-01 RX ORDER — LUBIPROSTONE 24 UG/1
24 CAPSULE ORAL 2 TIMES DAILY WITH MEALS
Qty: 60 CAPSULE | Refills: 3 | Status: SHIPPED | OUTPATIENT
Start: 2020-01-01 | End: 2021-01-01

## 2020-01-01 RX ORDER — ONDANSETRON 2 MG/ML
16 INJECTION INTRAMUSCULAR; INTRAVENOUS ONCE
Status: CANCELLED | OUTPATIENT
Start: 2021-01-01

## 2020-01-01 RX ORDER — HYDRALAZINE HYDROCHLORIDE 50 MG/1
50 TABLET, FILM COATED ORAL 2 TIMES DAILY
Qty: 180 TABLET | Refills: 1 | Status: SHIPPED | OUTPATIENT
Start: 2020-01-01 | End: 2020-01-01 | Stop reason: SDUPTHER

## 2020-01-01 RX ORDER — LISINOPRIL AND HYDROCHLOROTHIAZIDE 20; 25 MG/1; MG/1
1 TABLET ORAL 2 TIMES DAILY
Qty: 180 TABLET | Refills: 1 | Status: SHIPPED | OUTPATIENT
Start: 2020-01-01 | End: 2021-01-01 | Stop reason: SDUPTHER

## 2020-01-01 RX ORDER — HYDROCODONE BITARTRATE AND ACETAMINOPHEN 10; 325 MG/1; MG/1
1 TABLET ORAL EVERY 6 HOURS PRN
Qty: 120 TABLET | Refills: 0 | Status: SHIPPED | OUTPATIENT
Start: 2020-01-01 | End: 2021-01-01

## 2020-01-01 RX ORDER — SODIUM CHLORIDE 0.9 % (FLUSH) 0.9 %
10 SYRINGE (ML) INJECTION
Status: CANCELLED | OUTPATIENT
Start: 2021-01-01

## 2020-01-01 RX ORDER — FAMOTIDINE 20 MG/1
20 TABLET, FILM COATED ORAL 2 TIMES DAILY
COMMUNITY
Start: 2020-01-01 | End: 2020-01-01 | Stop reason: SDUPTHER

## 2020-01-01 RX ORDER — HYDRALAZINE HYDROCHLORIDE 50 MG/1
50 TABLET, FILM COATED ORAL 2 TIMES DAILY
Qty: 180 TABLET | Refills: 1 | Status: SHIPPED | OUTPATIENT
Start: 2020-01-01 | End: 2021-01-01 | Stop reason: SDUPTHER

## 2020-01-01 RX ORDER — PROMETHAZINE HYDROCHLORIDE 25 MG/1
25 TABLET ORAL
Qty: 30 TABLET | Refills: 5 | Status: CANCELLED | OUTPATIENT
Start: 2020-01-01

## 2020-01-01 RX ORDER — PROMETHAZINE HYDROCHLORIDE 25 MG/1
25 TABLET ORAL
Qty: 30 TABLET | Refills: 5 | Status: SHIPPED | OUTPATIENT
Start: 2020-01-01 | End: 2021-01-01

## 2020-01-01 RX ORDER — POTASSIUM CHLORIDE 750 MG/1
10 CAPSULE, EXTENDED RELEASE ORAL 2 TIMES DAILY
Qty: 180 CAPSULE | Refills: 1 | Status: SHIPPED | OUTPATIENT
Start: 2020-01-01 | End: 2020-01-01 | Stop reason: SDUPTHER

## 2020-01-01 RX ORDER — SODIUM CHLORIDE 9 MG/ML
INJECTION, SOLUTION INTRAVENOUS CONTINUOUS PRN
Status: DISCONTINUED | OUTPATIENT
Start: 2020-01-01 | End: 2020-01-01

## 2020-01-01 RX ORDER — EPINEPHRINE 0.22MG
100 AEROSOL WITH ADAPTER (ML) INHALATION DAILY
COMMUNITY
End: 2021-01-01

## 2020-01-01 RX ORDER — ONDANSETRON HYDROCHLORIDE 8 MG/1
8 TABLET, FILM COATED ORAL EVERY 8 HOURS PRN
Qty: 30 TABLET | Refills: 5 | Status: CANCELLED | OUTPATIENT
Start: 2020-01-01 | End: 2021-10-19

## 2020-01-01 RX ORDER — ONDANSETRON 4 MG/1
4 TABLET, ORALLY DISINTEGRATING ORAL EVERY 6 HOURS PRN
COMMUNITY
Start: 2020-01-01 | End: 2020-01-01

## 2020-01-01 RX ORDER — DIAZEPAM 5 MG/1
5 TABLET ORAL 2 TIMES DAILY
Qty: 50 TABLET | Refills: 0 | Status: SHIPPED | OUTPATIENT
Start: 2020-01-01 | End: 2021-01-01 | Stop reason: SDUPTHER

## 2020-01-01 RX ORDER — PANTOPRAZOLE SODIUM 40 MG/1
40 TABLET, DELAYED RELEASE ORAL DAILY
COMMUNITY
Start: 2020-01-01 | End: 2020-01-01 | Stop reason: SDUPTHER

## 2020-01-01 RX ORDER — CETIRIZINE HYDROCHLORIDE 10 MG/1
10 TABLET ORAL DAILY
COMMUNITY
End: 2021-01-01

## 2020-01-01 RX ORDER — POTASSIUM CHLORIDE 750 MG/1
10 CAPSULE, EXTENDED RELEASE ORAL 2 TIMES DAILY
Qty: 180 CAPSULE | Refills: 1 | Status: SHIPPED | OUTPATIENT
Start: 2020-01-01 | End: 2021-01-01 | Stop reason: SDUPTHER

## 2020-01-01 RX ORDER — INDOMETHACIN 25 MG/1
25 CAPSULE ORAL 2 TIMES DAILY PRN
Qty: 60 CAPSULE | Refills: 1 | Status: ON HOLD | OUTPATIENT
Start: 2020-01-01 | End: 2021-01-01 | Stop reason: HOSPADM

## 2020-01-01 RX ORDER — EZETIMIBE 10 MG/1
10 TABLET ORAL DAILY
Qty: 90 TABLET | Refills: 1 | Status: SHIPPED | OUTPATIENT
Start: 2020-01-01 | End: 2021-01-01 | Stop reason: SDUPTHER

## 2020-01-01 RX ORDER — BUPIVACAINE HYDROCHLORIDE 2.5 MG/ML
INJECTION, SOLUTION EPIDURAL; INFILTRATION; INTRACAUDAL
Status: COMPLETED | OUTPATIENT
Start: 2020-01-01 | End: 2020-01-01

## 2020-01-01 RX ORDER — ONDANSETRON 8 MG/1
8 TABLET, ORALLY DISINTEGRATING ORAL EVERY 8 HOURS PRN
Qty: 30 TABLET | Refills: 5 | Status: SHIPPED | OUTPATIENT
Start: 2020-01-01 | End: 2021-01-01 | Stop reason: SDUPTHER

## 2020-01-01 RX ORDER — SODIUM CHLORIDE 0.9 % (FLUSH) 0.9 %
2 SYRINGE (ML) INJECTION
Status: DISCONTINUED | OUTPATIENT
Start: 2020-01-01 | End: 2020-01-01 | Stop reason: HOSPADM

## 2020-01-01 RX ORDER — DULOXETIN HYDROCHLORIDE 60 MG/1
60 CAPSULE, DELAYED RELEASE ORAL DAILY
Qty: 90 CAPSULE | Refills: 1 | Status: SHIPPED | OUTPATIENT
Start: 2020-01-01 | End: 2021-01-01 | Stop reason: SDUPTHER

## 2020-01-01 RX ADMIN — GEMCITABINE HYDROCHLORIDE 2030 MG: 2 INJECTION, SOLUTION INTRAVENOUS at 01:12

## 2020-01-01 RX ADMIN — PROPOFOL 30 MG: 10 INJECTION, EMULSION INTRAVENOUS at 10:10

## 2020-01-01 RX ADMIN — FUROSEMIDE 20 MG: 10 INJECTION, SOLUTION INTRAMUSCULAR; INTRAVENOUS at 11:12

## 2020-01-01 RX ADMIN — PACLITAXEL 260 MG: 100 INJECTION, POWDER, LYOPHILIZED, FOR SUSPENSION INTRAVENOUS at 01:12

## 2020-01-01 RX ADMIN — SODIUM CHLORIDE: 0.9 INJECTION, SOLUTION INTRAVENOUS at 11:09

## 2020-01-01 RX ADMIN — EPOETIN ALFA-EPBX 20000 UNITS: 10000 INJECTION, SOLUTION INTRAVENOUS; SUBCUTANEOUS at 04:12

## 2020-01-01 RX ADMIN — PROPOFOL 30 MG: 10 INJECTION, EMULSION INTRAVENOUS at 11:09

## 2020-01-01 RX ADMIN — ACETAMINOPHEN 650 MG: 325 TABLET ORAL at 10:10

## 2020-01-01 RX ADMIN — PACLITAXEL 250 MG: 100 INJECTION, POWDER, LYOPHILIZED, FOR SUSPENSION INTRAVENOUS at 11:12

## 2020-01-01 RX ADMIN — SODIUM CHLORIDE: 0.9 INJECTION, SOLUTION INTRAVENOUS at 10:12

## 2020-01-01 RX ADMIN — ACETAMINOPHEN 650 MG: 325 TABLET ORAL at 10:12

## 2020-01-01 RX ADMIN — ONDANSETRON 16 MG: 2 INJECTION INTRAMUSCULAR; INTRAVENOUS at 11:12

## 2020-01-01 RX ADMIN — ONDANSETRON 16 MG: 2 INJECTION INTRAMUSCULAR; INTRAVENOUS at 11:10

## 2020-01-01 RX ADMIN — BUPIVACAINE HYDROCHLORIDE 23 ML: 2.5 INJECTION, SOLUTION EPIDURAL; INFILTRATION; INTRACAUDAL; PERINEURAL at 10:10

## 2020-01-01 RX ADMIN — FILGRASTIM 480 MCG: 480 INJECTION, SOLUTION INTRAVENOUS; SUBCUTANEOUS at 02:11

## 2020-01-01 RX ADMIN — ONDANSETRON 16 MG: 2 INJECTION INTRAMUSCULAR; INTRAVENOUS at 01:12

## 2020-01-01 RX ADMIN — ONDANSETRON 16 MG: 2 INJECTION INTRAMUSCULAR; INTRAVENOUS at 11:11

## 2020-01-01 RX ADMIN — PROPOFOL 50 MG: 10 INJECTION, EMULSION INTRAVENOUS at 10:10

## 2020-01-01 RX ADMIN — FILGRASTIM 480 MCG: 480 INJECTION, SOLUTION INTRAVENOUS; SUBCUTANEOUS at 10:11

## 2020-01-01 RX ADMIN — DIPHENHYDRAMINE HYDROCHLORIDE 25 MG: 50 INJECTION INTRAMUSCULAR; INTRAVENOUS at 12:11

## 2020-01-01 RX ADMIN — PROPOFOL 40 MG: 10 INJECTION, EMULSION INTRAVENOUS at 11:09

## 2020-01-01 RX ADMIN — ACETAMINOPHEN 650 MG: 325 TABLET ORAL at 11:11

## 2020-01-01 RX ADMIN — ONDANSETRON 16 MG: 2 INJECTION INTRAMUSCULAR; INTRAVENOUS at 02:11

## 2020-01-01 RX ADMIN — IOHEXOL 100 ML: 350 INJECTION, SOLUTION INTRAVENOUS at 01:10

## 2020-01-01 RX ADMIN — SODIUM CHLORIDE: 0.9 INJECTION, SOLUTION INTRAVENOUS at 11:12

## 2020-01-01 RX ADMIN — DIPHENHYDRAMINE HYDROCHLORIDE 25 MG: 50 INJECTION INTRAMUSCULAR; INTRAVENOUS at 10:12

## 2020-01-01 RX ADMIN — DEXAMETHASONE SODIUM PHOSPHATE 10 MG: 4 INJECTION, SOLUTION INTRA-ARTICULAR; INTRALESIONAL; INTRAMUSCULAR; INTRAVENOUS; SOFT TISSUE at 11:10

## 2020-01-01 RX ADMIN — FAMOTIDINE 20 MG: 10 INJECTION INTRAVENOUS at 02:11

## 2020-01-01 RX ADMIN — ONDANSETRON 16 MG: 2 INJECTION INTRAMUSCULAR; INTRAVENOUS at 10:12

## 2020-01-01 RX ADMIN — SODIUM CHLORIDE, PRESERVATIVE FREE 10 ML: 5 INJECTION INTRAVENOUS at 02:12

## 2020-01-01 RX ADMIN — DEXAMETHASONE SODIUM PHOSPHATE 10 MG: 4 INJECTION, SOLUTION INTRA-ARTICULAR; INTRALESIONAL; INTRAMUSCULAR; INTRAVENOUS; SOFT TISSUE at 10:12

## 2020-01-01 RX ADMIN — DEXAMETHASONE SODIUM PHOSPHATE 10 MG: 4 INJECTION, SOLUTION INTRA-ARTICULAR; INTRALESIONAL; INTRAMUSCULAR; INTRAVENOUS; SOFT TISSUE at 01:11

## 2020-01-01 RX ADMIN — GEMCITABINE HYDROCHLORIDE 2050 MG: 2 INJECTION, SOLUTION INTRAVENOUS at 03:12

## 2020-01-01 RX ADMIN — PACLITAXEL 260 MG: 100 INJECTION, POWDER, LYOPHILIZED, FOR SUSPENSION INTRAVENOUS at 12:10

## 2020-01-01 RX ADMIN — GADOBUTROL 9 ML: 604.72 INJECTION INTRAVENOUS at 11:10

## 2020-01-01 RX ADMIN — GEMCITABINE HYDROCHLORIDE 2040 MG: 2 INJECTION, SOLUTION INTRAVENOUS at 12:10

## 2020-01-01 RX ADMIN — DIPHENHYDRAMINE HYDROCHLORIDE 25 MG: 50 INJECTION INTRAMUSCULAR; INTRAVENOUS at 11:10

## 2020-01-01 RX ADMIN — FENTANYL CITRATE 25 MCG: 50 INJECTION INTRAMUSCULAR; INTRAVENOUS at 12:10

## 2020-01-01 RX ADMIN — ACETAMINOPHEN 650 MG: 325 TABLET ORAL at 11:10

## 2020-01-01 RX ADMIN — DEXAMETHASONE SODIUM PHOSPHATE 10 MG: 4 INJECTION, SOLUTION INTRA-ARTICULAR; INTRALESIONAL; INTRAMUSCULAR; INTRAVENOUS; SOFT TISSUE at 11:12

## 2020-01-01 RX ADMIN — DIPHENHYDRAMINE HYDROCHLORIDE 25 MG: 50 INJECTION INTRAMUSCULAR; INTRAVENOUS at 10:10

## 2020-01-01 RX ADMIN — PROPOFOL 20 MG: 10 INJECTION, EMULSION INTRAVENOUS at 11:09

## 2020-01-01 RX ADMIN — FAMOTIDINE 20 MG: 10 INJECTION INTRAVENOUS at 10:12

## 2020-01-01 RX ADMIN — GEMCITABINE HYDROCHLORIDE 2040 MG: 2 INJECTION, SOLUTION INTRAVENOUS at 01:10

## 2020-01-01 RX ADMIN — FAMOTIDINE 20 MG: 10 INJECTION INTRAVENOUS at 12:11

## 2020-01-01 RX ADMIN — FAMOTIDINE 20 MG: 10 INJECTION INTRAVENOUS at 10:10

## 2020-01-01 RX ADMIN — SODIUM CHLORIDE: 0.9 INJECTION, SOLUTION INTRAVENOUS at 10:10

## 2020-01-01 RX ADMIN — PROPOFOL 60 MG: 10 INJECTION, EMULSION INTRAVENOUS at 11:09

## 2020-01-01 RX ADMIN — GEMCITABINE HYDROCHLORIDE 2040 MG: 2 INJECTION, SOLUTION INTRAVENOUS at 12:12

## 2020-01-01 RX ADMIN — GEMCITABINE HYDROCHLORIDE 1980 MG: 2 INJECTION, SOLUTION INTRAVENOUS at 01:12

## 2020-01-01 RX ADMIN — PROPOFOL 20 MG: 10 INJECTION, EMULSION INTRAVENOUS at 10:10

## 2020-01-01 RX ADMIN — DIPHENHYDRAMINE HYDROCHLORIDE 12.5 MG: 50 INJECTION INTRAMUSCULAR; INTRAVENOUS at 11:12

## 2020-01-01 RX ADMIN — SODIUM CHLORIDE: 0.9 INJECTION, SOLUTION INTRAVENOUS at 01:11

## 2020-01-01 RX ADMIN — FAMOTIDINE 20 MG: 10 INJECTION INTRAVENOUS at 12:12

## 2020-01-01 RX ADMIN — FAMOTIDINE 20 MG: 10 INJECTION INTRAVENOUS at 12:10

## 2020-01-01 RX ADMIN — FILGRASTIM 480 MCG: 480 INJECTION, SOLUTION INTRAVENOUS; SUBCUTANEOUS at 11:11

## 2020-01-01 RX ADMIN — ACETAMINOPHEN 650 MG: 325 TABLET ORAL at 01:11

## 2020-01-01 RX ADMIN — PACLITAXEL 260 MG: 100 INJECTION, POWDER, LYOPHILIZED, FOR SUSPENSION INTRAVENOUS at 03:11

## 2020-01-01 RX ADMIN — DEXAMETHASONE SODIUM PHOSPHATE 10 MG: 4 INJECTION, SOLUTION INTRA-ARTICULAR; INTRALESIONAL; INTRAMUSCULAR; INTRAVENOUS; SOFT TISSUE at 10:10

## 2020-01-01 RX ADMIN — FAMOTIDINE 20 MG: 10 INJECTION INTRAVENOUS at 11:12

## 2020-01-01 RX ADMIN — GEMCITABINE HYDROCHLORIDE 2040 MG: 2 INJECTION, SOLUTION INTRAVENOUS at 02:11

## 2020-01-01 RX ADMIN — PACLITAXEL 260 MG: 100 INJECTION, POWDER, LYOPHILIZED, FOR SUSPENSION INTRAVENOUS at 11:10

## 2020-01-01 RX ADMIN — PACLITAXEL 260 MG: 100 INJECTION, POWDER, LYOPHILIZED, FOR SUSPENSION INTRAVENOUS at 11:12

## 2020-01-01 RX ADMIN — DEXAMETHASONE SODIUM PHOSPHATE 10 MG: 4 INJECTION, SOLUTION INTRA-ARTICULAR; INTRALESIONAL; INTRAMUSCULAR; INTRAVENOUS; SOFT TISSUE at 12:11

## 2020-01-01 RX ADMIN — ACETAMINOPHEN 650 MG: 325 TABLET ORAL at 12:12

## 2020-01-01 RX ADMIN — DEXAMETHASONE SODIUM PHOSPHATE 10 MG: 4 INJECTION, SOLUTION INTRA-ARTICULAR; INTRALESIONAL; INTRAMUSCULAR; INTRAVENOUS; SOFT TISSUE at 12:12

## 2020-01-01 RX ADMIN — PACLITAXEL 250 MG: 100 INJECTION, POWDER, LYOPHILIZED, FOR SUSPENSION INTRAVENOUS at 12:11

## 2020-01-01 RX ADMIN — DIPHENHYDRAMINE HYDROCHLORIDE 12.5 MG: 50 INJECTION INTRAMUSCULAR; INTRAVENOUS at 01:12

## 2020-01-01 RX ADMIN — DIPHENHYDRAMINE HYDROCHLORIDE 25 MG: 50 INJECTION INTRAMUSCULAR; INTRAVENOUS at 01:11

## 2020-01-01 RX ADMIN — GEMCITABINE HYDROCHLORIDE 2010 MG: 2 INJECTION, SOLUTION INTRAVENOUS at 02:11

## 2020-01-10 RX ORDER — POTASSIUM CHLORIDE 750 MG/1
10 TABLET, EXTENDED RELEASE ORAL 2 TIMES DAILY
Qty: 180 TABLET | Refills: 1 | Status: SHIPPED | OUTPATIENT
Start: 2020-01-10 | End: 2020-04-09

## 2020-01-10 NOTE — TELEPHONE ENCOUNTER
----- Message from Nancy Hicks sent at 1/10/2020 12:21 PM CST -----  Contact: Patients Wife Lilia RUSSELL @ 12:15 Wife stating patient has been out of his Potassium for more than 10 days please send refills [facility] in [place].  # [phone]

## 2020-01-15 ENCOUNTER — OFFICE VISIT (OUTPATIENT)
Dept: FAMILY MEDICINE | Facility: CLINIC | Age: 70
End: 2020-01-15
Payer: MEDICARE

## 2020-01-15 VITALS
HEART RATE: 76 BPM | WEIGHT: 213 LBS | SYSTOLIC BLOOD PRESSURE: 128 MMHG | HEIGHT: 69 IN | BODY MASS INDEX: 31.55 KG/M2 | DIASTOLIC BLOOD PRESSURE: 78 MMHG

## 2020-01-15 DIAGNOSIS — E26.9 HYPERALDOSTERONISM: ICD-10-CM

## 2020-01-15 DIAGNOSIS — Z86.79 HISTORY OF SUBDURAL HEMATOMA: Primary | ICD-10-CM

## 2020-01-15 DIAGNOSIS — M10.9 GOUT, UNSPECIFIED CAUSE, UNSPECIFIED CHRONICITY, UNSPECIFIED SITE: ICD-10-CM

## 2020-01-15 DIAGNOSIS — H61.22: ICD-10-CM

## 2020-01-15 DIAGNOSIS — I10 ESSENTIAL HYPERTENSION: ICD-10-CM

## 2020-01-15 DIAGNOSIS — H04.123 DRY EYE SYNDROME OF BOTH EYES: ICD-10-CM

## 2020-01-15 DIAGNOSIS — E03.9 ACQUIRED HYPOTHYROIDISM: ICD-10-CM

## 2020-01-15 DIAGNOSIS — M51.36 DEGENERATION OF LUMBAR INTERVERTEBRAL DISC: ICD-10-CM

## 2020-01-15 DIAGNOSIS — E78.00 PURE HYPERCHOLESTEROLEMIA: ICD-10-CM

## 2020-01-15 DIAGNOSIS — G47.33 OSA ON CPAP: ICD-10-CM

## 2020-01-15 DIAGNOSIS — Z12.5 PROSTATE CANCER SCREENING: ICD-10-CM

## 2020-01-15 PROCEDURE — 99214 OFFICE O/P EST MOD 30 MIN: CPT | Mod: 25,S$GLB,, | Performed by: NURSE PRACTITIONER

## 2020-01-15 PROCEDURE — 69209 EAR CERUMEN REMOVAL: ICD-10-PCS | Mod: LT,S$GLB,, | Performed by: NURSE PRACTITIONER

## 2020-01-15 PROCEDURE — 1159F PR MEDICATION LIST DOCUMENTED IN MEDICAL RECORD: ICD-10-PCS | Mod: S$GLB,,, | Performed by: NURSE PRACTITIONER

## 2020-01-15 PROCEDURE — 69209 REMOVE IMPACTED EAR WAX UNI: CPT | Mod: LT,S$GLB,, | Performed by: NURSE PRACTITIONER

## 2020-01-15 PROCEDURE — 1159F MED LIST DOCD IN RCRD: CPT | Mod: S$GLB,,, | Performed by: NURSE PRACTITIONER

## 2020-01-15 PROCEDURE — 99214 PR OFFICE/OUTPT VISIT, EST, LEVL IV, 30-39 MIN: ICD-10-PCS | Mod: 25,S$GLB,, | Performed by: NURSE PRACTITIONER

## 2020-01-15 RX ORDER — DULOXETIN HYDROCHLORIDE 60 MG/1
60 CAPSULE, DELAYED RELEASE ORAL DAILY
Qty: 90 CAPSULE | Refills: 1 | Status: SHIPPED | OUTPATIENT
Start: 2020-01-15 | End: 2020-01-01 | Stop reason: SDUPTHER

## 2020-01-15 RX ORDER — CYCLOSPORINE 0.5 MG/ML
1 EMULSION OPHTHALMIC 2 TIMES DAILY
Qty: 30 EACH | Refills: 5 | Status: SHIPPED | OUTPATIENT
Start: 2020-01-15 | End: 2021-01-01

## 2020-01-15 RX ORDER — ALLOPURINOL 100 MG/1
100 TABLET ORAL DAILY
Qty: 90 TABLET | Refills: 1 | Status: SHIPPED | OUTPATIENT
Start: 2020-01-15 | End: 2020-08-07 | Stop reason: SDUPTHER

## 2020-01-15 RX ORDER — LISINOPRIL AND HYDROCHLOROTHIAZIDE 20; 25 MG/1; MG/1
1 TABLET ORAL DAILY
Qty: 90 TABLET | Refills: 1 | Status: SHIPPED | OUTPATIENT
Start: 2020-01-15 | End: 2020-06-03 | Stop reason: SDUPTHER

## 2020-01-15 RX ORDER — EZETIMIBE 10 MG/1
10 TABLET ORAL DAILY
Qty: 90 TABLET | Refills: 1 | Status: SHIPPED | OUTPATIENT
Start: 2020-01-15 | End: 2020-08-07 | Stop reason: SDUPTHER

## 2020-01-15 RX ORDER — LEVOTHYROXINE SODIUM 25 UG/1
25 TABLET ORAL DAILY
Qty: 90 TABLET | Refills: 1 | Status: SHIPPED | OUTPATIENT
Start: 2020-01-15 | End: 2020-01-01 | Stop reason: SDUPTHER

## 2020-01-15 RX ORDER — HYDROCODONE BITARTRATE AND ACETAMINOPHEN 5; 325 MG/1; MG/1
TABLET ORAL
Refills: 0 | COMMUNITY
Start: 2019-11-16 | End: 2020-01-01

## 2020-01-15 RX ORDER — EPLERENONE 50 MG/1
50 TABLET, FILM COATED ORAL 2 TIMES DAILY
Qty: 180 TABLET | Refills: 1 | Status: SHIPPED | OUTPATIENT
Start: 2020-01-15 | End: 2020-07-29 | Stop reason: SDUPTHER

## 2020-01-15 RX ORDER — HYDRALAZINE HYDROCHLORIDE 50 MG/1
50 TABLET, FILM COATED ORAL 2 TIMES DAILY
Qty: 180 TABLET | Refills: 1 | Status: SHIPPED | OUTPATIENT
Start: 2020-01-15 | End: 2020-01-01 | Stop reason: SDUPTHER
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
## 2020-01-15 NOTE — PROCEDURES
Ear Cerumen Removal  Date/Time: 1/15/2020 11:00 AM  Performed by: Kerri Valdez NP  Authorized by: Kerri Valdez NP     Consent Done?:  Yes (Verbal)    Local anesthetic:  None  Location details:  Left ear  Procedure type: irrigation    Patient tolerance:  Patient tolerated the procedure well with no immediate complications

## 2020-05-29 ENCOUNTER — TELEPHONE (OUTPATIENT)
Dept: FAMILY MEDICINE | Facility: CLINIC | Age: 70
End: 2020-05-29

## 2020-05-29 DIAGNOSIS — N13.8 BPH WITH OBSTRUCTION/LOWER URINARY TRACT SYMPTOMS: ICD-10-CM

## 2020-05-29 DIAGNOSIS — N40.1 BPH WITH OBSTRUCTION/LOWER URINARY TRACT SYMPTOMS: ICD-10-CM

## 2020-05-29 RX ORDER — FINASTERIDE 5 MG/1
5 TABLET, FILM COATED ORAL DAILY
Qty: 90 TABLET | Refills: 1 | Status: SHIPPED | OUTPATIENT
Start: 2020-05-29 | End: 2020-01-01 | Stop reason: SDUPTHER

## 2020-05-29 NOTE — TELEPHONE ENCOUNTER
----- Message from Annmarie Vigil sent at 5/29/2020  7:06 AM CDT -----  vm- pt wife flavio is calling to check on the status on finasteride approval. They have been waiting some time for it.   [phone]

## 2020-05-29 NOTE — TELEPHONE ENCOUNTER
----- Message from Mery Muñoz MA sent at 5/29/2020 10:08 AM CDT -----  Pt is requesting refill:    Finasteride (does not know mg)    Pharmacy - [facility] [address] in [place]    Pt - [phone]

## 2020-06-03 DIAGNOSIS — I10 ESSENTIAL HYPERTENSION: ICD-10-CM

## 2020-06-03 RX ORDER — LISINOPRIL AND HYDROCHLOROTHIAZIDE 20; 25 MG/1; MG/1
1 TABLET ORAL 2 TIMES DAILY
Qty: 180 TABLET | Refills: 1 | Status: SHIPPED | OUTPATIENT
Start: 2020-06-03 | End: 2020-01-01 | Stop reason: SDUPTHER

## 2020-06-03 NOTE — TELEPHONE ENCOUNTER
----- Message from Marcia Flowers sent at 6/3/2020  1:53 PM CDT -----  Contact: Michael pérez  The patient said he has a Rx for lisinopril 1 pill a day. His nephrologist  said take 2 a day. How is he suppose to take this. He needs a new Rx.He needs a nurse to call him .   pts # [phone] gh

## 2020-06-03 NOTE — TELEPHONE ENCOUNTER
Spoke to pt. He states he has taken Lisinopril-Hctz BID for 10 years but we sent for QD so now their saying he cannot get a refill. Needs us to send the Rx in correctly for BID.    Rx set up

## 2020-06-29 ENCOUNTER — TELEPHONE (OUTPATIENT)
Dept: FAMILY MEDICINE | Facility: CLINIC | Age: 70
End: 2020-06-29

## 2020-06-29 NOTE — TELEPHONE ENCOUNTER
Called pt, 2nd attempt. Called his mobile number thus time. LMOR to return call in regards to his request for an appt.

## 2020-06-29 NOTE — TELEPHONE ENCOUNTER
----- Message from Jacey Conley MA sent at 6/29/2020 11:02 AM CDT -----  Regarding: FW: schedule an appt.  Contact: Flavio Watson    ----- Message -----  From: Jean Sanford  Sent: 6/29/2020  10:24 AM CDT  To: [facility] Staff  Subject: schedule an appt.                                Pt was having trouble urinating over the weekend and back pain. Pt 's wife would like to know can he be seen by pedro tomorrow. Please give flavio a call # [phone]

## 2020-07-02 ENCOUNTER — TELEPHONE (OUTPATIENT)
Dept: FAMILY MEDICINE | Facility: CLINIC | Age: 70
End: 2020-07-02

## 2020-07-02 NOTE — TELEPHONE ENCOUNTER
Spoke to pt to let him know he is due to have fasting labs before his next office visit. Pt verbalized understanding

## 2020-07-08 ENCOUNTER — TELEPHONE (OUTPATIENT)
Dept: FAMILY MEDICINE | Facility: CLINIC | Age: 70
End: 2020-07-08

## 2020-07-16 ENCOUNTER — OFFICE VISIT (OUTPATIENT)
Dept: FAMILY MEDICINE | Facility: CLINIC | Age: 70
End: 2020-07-16
Payer: MEDICARE

## 2020-07-16 ENCOUNTER — TELEPHONE (OUTPATIENT)
Dept: FAMILY MEDICINE | Facility: CLINIC | Age: 70
End: 2020-07-16

## 2020-07-16 VITALS
DIASTOLIC BLOOD PRESSURE: 78 MMHG | HEIGHT: 69 IN | BODY MASS INDEX: 30.51 KG/M2 | SYSTOLIC BLOOD PRESSURE: 136 MMHG | HEART RATE: 60 BPM | WEIGHT: 206 LBS | TEMPERATURE: 98 F

## 2020-07-16 DIAGNOSIS — M51.36 DEGENERATION OF LUMBAR INTERVERTEBRAL DISC: ICD-10-CM

## 2020-07-16 DIAGNOSIS — N40.1 BPH WITH OBSTRUCTION/LOWER URINARY TRACT SYMPTOMS: ICD-10-CM

## 2020-07-16 DIAGNOSIS — E03.9 ACQUIRED HYPOTHYROIDISM: ICD-10-CM

## 2020-07-16 DIAGNOSIS — E78.00 PURE HYPERCHOLESTEROLEMIA: ICD-10-CM

## 2020-07-16 DIAGNOSIS — Z86.79 HISTORY OF SUBDURAL HEMATOMA: ICD-10-CM

## 2020-07-16 DIAGNOSIS — I10 ESSENTIAL HYPERTENSION: ICD-10-CM

## 2020-07-16 DIAGNOSIS — N13.8 BPH WITH OBSTRUCTION/LOWER URINARY TRACT SYMPTOMS: ICD-10-CM

## 2020-07-16 DIAGNOSIS — G47.33 OSA ON CPAP: Primary | ICD-10-CM

## 2020-07-16 DIAGNOSIS — G60.9 IDIOPATHIC PERIPHERAL NEUROPATHY: ICD-10-CM

## 2020-07-16 DIAGNOSIS — E26.9 HYPERALDOSTERONISM: ICD-10-CM

## 2020-07-16 DIAGNOSIS — B35.1 ONYCHOMYCOSIS: ICD-10-CM

## 2020-07-16 DIAGNOSIS — M1A.09X0 IDIOPATHIC CHRONIC GOUT OF MULTIPLE SITES WITHOUT TOPHUS: ICD-10-CM

## 2020-07-16 DIAGNOSIS — M50.90 CERVICAL DISC DISEASE: ICD-10-CM

## 2020-07-16 PROCEDURE — 99214 PR OFFICE/OUTPT VISIT, EST, LEVL IV, 30-39 MIN: ICD-10-PCS | Mod: S$GLB,,, | Performed by: FAMILY MEDICINE

## 2020-07-16 PROCEDURE — 99214 OFFICE O/P EST MOD 30 MIN: CPT | Mod: S$GLB,,, | Performed by: FAMILY MEDICINE

## 2020-07-16 RX ORDER — HYDROCODONE BITARTRATE AND ACETAMINOPHEN 10; 325 MG/1; MG/1
1 TABLET ORAL DAILY PRN
COMMUNITY
Start: 2020-07-07 | End: 2020-01-01

## 2020-07-16 RX ORDER — POTASSIUM CHLORIDE 750 MG/1
10 CAPSULE, EXTENDED RELEASE ORAL 2 TIMES DAILY
Qty: 180 CAPSULE | Refills: 1 | Status: SHIPPED | OUTPATIENT
Start: 2020-07-16 | End: 2020-01-01 | Stop reason: SDUPTHER

## 2020-07-16 NOTE — TELEPHONE ENCOUNTER
Tried calling patient again about his appointment with Dr. Mendoza at 10:40 to see if we can switch him to a phone visit since he has not gotten established on the My Chart good yet. LMOR for pt to call back ASAP

## 2020-07-16 NOTE — PROGRESS NOTES
Subjective:        The chief complaint leading to consultation is: ***  The patient location is:  {Patient Location:34[address]}  Visit type: Virtual visit with synchronous audio/video or audio only  {Virtual Visit Type:53098}    HPI    Past Surgical History:   Procedure Laterality Date    CHERISE HOLE FOR SUBDURAL HEMATOMA  2019    [facility].Ms.    PENILE PROSTHESIS IMPLANT      Right shoulder repair      UVULECTOMY       No past medical history on file.  No family history on file.     Social History:   Marital Status:   Alcohol History:  has no history on file for alcohol.  Tobacco History:  reports that he has quit smoking. He has never used smokeless tobacco.  Drug History:  has no history on file for drug.    Review of patient's allergies indicates:   Allergen Reactions    Norvasc [amlodipine]     Penicillins     Statins-hmg-coa reductase inhibitors     Sulfa (sulfonamide antibiotics)     Sulfur Other (See Comments)       Current Outpatient Medications   Medication Sig Dispense Refill    allopurinol (ZYLOPRIM) 100 MG tablet Take 1 tablet (100 mg total) by mouth once daily. 90 tablet 1    clindamycin (CLEOCIN) 300 MG capsule Take 300 mg by mouth 3 (three) times daily.      cycloSPORINE (RESTASIS) 0.05 % ophthalmic emulsion Place 1 drop into both eyes 2 (two) times daily. 30 each 5    DULoxetine (CYMBALTA) 60 MG capsule Take 1 capsule (60 mg total) by mouth once daily. 90 capsule 1    eplerenone (INSPRA) 50 MG Tab Take 1 tablet (50 mg total) by mouth 2 (two) times daily. 180 tablet 1    ezetimibe (ZETIA) 10 mg tablet Take 1 tablet (10 mg total) by mouth once daily. 90 tablet 1    finasteride (PROSCAR) 5 mg tablet Take 1 tablet (5 mg total) by mouth once daily. 90 tablet 1    fluticasone propionate (FLONASE) 50 mcg/actuation nasal spray 1 spray (50 mcg total) by Each Nostril route once daily. 3 Bottle 2    hydrALAZINE (APRESOLINE) 50 MG tablet Take 1 tablet (50 mg total)  by mouth 2 (two) times daily. 180 tablet 1    HYDROcodone-acetaminophen (NORCO)  mg per tablet       HYDROcodone-acetaminophen (NORCO) 5-325 mg per tablet   0    indomethacin (INDOCIN) 25 MG capsule Take 25 mg by mouth 2 (two) times daily with meals.      levothyroxine (SYNTHROID) 25 MCG tablet Take 1 tablet (25 mcg total) by mouth once daily. 90 tablet 1    lisinopriL-hydrochlorothiazide (ZESTORETIC) 20-25 mg Tab Take 1 tablet by mouth 2 (two) times a day. 180 tablet 1    lubiprostone (AMITIZA) 24 MCG Cap Take 24 mcg by mouth 2 (two) times daily with meals.       No current facility-administered medications for this visit.        Review of Systems   Constitutional: Negative for appetite change, chills, fatigue, fever and unexpected weight change.   HENT: Negative for congestion, ear pain and trouble swallowing.    Eyes: Negative for pain, discharge and visual disturbance.   Respiratory: Negative for apnea, cough, shortness of breath and wheezing.    Cardiovascular: Negative for chest pain and leg swelling.   Gastrointestinal: Negative for abdominal pain, blood in stool, constipation, diarrhea, nausea and vomiting.   Endocrine: Negative for cold intolerance, heat intolerance and polydipsia.   Genitourinary: Negative for dysuria, hematuria, penile pain (has penile implant that works ok), testicular pain and urgency.        Nocturia  none   Musculoskeletal: Negative for gait problem, joint swelling and myalgias.   Neurological: Negative for dizziness, seizures and numbness.   Psychiatric/Behavioral: Negative for agitation, behavioral problems and hallucinations. The patient is not nervous/anxious.          Objective:        Physical Exam:   Physical Exam         Assessment:       No diagnosis found.  Plan:   There are no diagnoses linked to this encounter.  No follow-ups on file.    Total time spent with patient: ***    Each patient to whom he or she provides medical services by telemedicine is:  (1)  informed of the relationship between the physician and patient and the respective role of any other health care provider with respect to management of the patient; and (2) notified that he or she may decline to receive medical services by telemedicine and may withdraw from such care at any time.    This note was created using mojio voice recognition software that occasionally misinterprets phrases or words.

## 2020-07-17 LAB
ALBUMIN SERPL-MCNC: 4 G/DL (ref 3.6–5.1)
ALBUMIN/CREAT UR: 4 MCG/MG CREAT
ALBUMIN/GLOB SERPL: 1.3 (CALC) (ref 1–2.5)
ALP SERPL-CCNC: 39 U/L (ref 35–144)
ALT SERPL-CCNC: 30 U/L (ref 9–46)
AMORPH SED URNS QL MICRO: ABNORMAL /HPF
APPEARANCE UR: ABNORMAL
AST SERPL-CCNC: 30 U/L (ref 10–35)
BACTERIA #/AREA URNS HPF: ABNORMAL /HPF
BACTERIA UR CULT: ABNORMAL
BASOPHILS # BLD AUTO: 78 CELLS/UL (ref 0–200)
BASOPHILS NFR BLD AUTO: 1 %
BILIRUB SERPL-MCNC: 0.5 MG/DL (ref 0.2–1.2)
BILIRUB UR QL STRIP: NEGATIVE
BUN SERPL-MCNC: 20 MG/DL (ref 7–25)
BUN/CREAT SERPL: ABNORMAL (CALC) (ref 6–22)
CALCIUM SERPL-MCNC: 9.6 MG/DL (ref 8.6–10.3)
CHLORIDE SERPL-SCNC: 100 MMOL/L (ref 98–110)
CHOLEST SERPL-MCNC: 190 MG/DL
CHOLEST/HDLC SERPL: 3.1 (CALC)
CO2 SERPL-SCNC: 31 MMOL/L (ref 20–32)
COLOR UR: YELLOW
CREAT SERPL-MCNC: 0.84 MG/DL (ref 0.7–1.18)
CREAT UR-MCNC: 130 MG/DL (ref 20–320)
EOSINOPHIL # BLD AUTO: 133 CELLS/UL (ref 15–500)
EOSINOPHIL NFR BLD AUTO: 1.7 %
ERYTHROCYTE [DISTWIDTH] IN BLOOD BY AUTOMATED COUNT: 13.4 % (ref 11–15)
GFRSERPLBLD MDRD-ARVRAT: 88 ML/MIN/1.73M2
GLOBULIN SER CALC-MCNC: 3.2 G/DL (CALC) (ref 1.9–3.7)
GLUCOSE SERPL-MCNC: 134 MG/DL (ref 65–99)
GLUCOSE UR QL STRIP: NEGATIVE
HCT VFR BLD AUTO: 45.8 % (ref 38.5–50)
HDLC SERPL-MCNC: 61 MG/DL
HGB BLD-MCNC: 14.5 G/DL (ref 13.2–17.1)
HGB UR QL STRIP: NEGATIVE
HYALINE CASTS #/AREA URNS LPF: ABNORMAL /LPF
KETONES UR QL STRIP: NEGATIVE
LDLC SERPL CALC-MCNC: 110 MG/DL (CALC)
LEUKOCYTE ESTERASE UR QL STRIP: NEGATIVE
LYMPHOCYTES # BLD AUTO: 1412 CELLS/UL (ref 850–3900)
LYMPHOCYTES NFR BLD AUTO: 18.1 %
MCH RBC QN AUTO: 27.1 PG (ref 27–33)
MCHC RBC AUTO-ENTMCNC: 31.7 G/DL (ref 32–36)
MCV RBC AUTO: 85.6 FL (ref 80–100)
MICROALBUMIN UR-MCNC: 0.5 MG/DL
MONOCYTES # BLD AUTO: 523 CELLS/UL (ref 200–950)
MONOCYTES NFR BLD AUTO: 6.7 %
NEUTROPHILS # BLD AUTO: 5655 CELLS/UL (ref 1500–7800)
NEUTROPHILS NFR BLD AUTO: 72.5 %
NITRITE UR QL STRIP: NEGATIVE
NONHDLC SERPL-MCNC: 129 MG/DL (CALC)
PH UR STRIP: 8 [PH] (ref 5–8)
PLATELET # BLD AUTO: 236 THOUSAND/UL (ref 140–400)
PMV BLD REES-ECKER: 10.4 FL (ref 7.5–12.5)
POTASSIUM SERPL-SCNC: 3.9 MMOL/L (ref 3.5–5.3)
PROT SERPL-MCNC: 7.2 G/DL (ref 6.1–8.1)
PROT UR QL STRIP: NEGATIVE
PSA SERPL-MCNC: 1.4 NG/ML
RBC # BLD AUTO: 5.35 MILLION/UL (ref 4.2–5.8)
RBC #/AREA URNS HPF: ABNORMAL /HPF
SERVICE CMNT-IMP: ABNORMAL
SODIUM SERPL-SCNC: 138 MMOL/L (ref 135–146)
SP GR UR STRIP: 1.02 (ref 1–1.03)
SQUAMOUS #/AREA URNS HPF: ABNORMAL /HPF
TRIGL SERPL-MCNC: 95 MG/DL
TSH SERPL-ACNC: 2.59 MIU/L (ref 0.4–4.5)
WBC # BLD AUTO: 7.8 THOUSAND/UL (ref 3.8–10.8)
WBC #/AREA URNS HPF: ABNORMAL /HPF

## 2020-07-18 ENCOUNTER — TELEPHONE (OUTPATIENT)
Dept: FAMILY MEDICINE | Facility: CLINIC | Age: 70
End: 2020-07-18

## 2020-07-18 PROBLEM — B35.1 ONYCHOMYCOSIS: Status: ACTIVE | Noted: 2020-07-18

## 2020-07-18 NOTE — TELEPHONE ENCOUNTER
Please call patient with his lab reports from 07/16/2020.  Blood sugar slightly high at 134.  CBC shows no anemia, thyroid kidneys liver were all normal.  Cholesterol was normal at 190.  PSA normal at 1.4.  Continue current medications.  Recheck office visit with labs in 6 months

## 2020-07-18 NOTE — PROGRESS NOTES
SUBJECTIVE:    Patient ID: Michael Watson is a 69 y.o. male.    Chief Complaint: Follow-up (did not bring bottles, only needs a refill on potassium // colonoscopy - 3 years ago  in [facility] )    This 69-year-old male comes in for a checkup.  He has been staying active during this COVID virus crisis on his 7 acre farm up in [place].  He has been staying active building raised garden beds as well as raising ducks ,chickens ,& quite large garden.  He walks a lot over [phone] steps per day.    He is status post fall and subdural hematoma in 2019.  He did have craniotomy to help resolve this.  He has COURTNEY in uses CPAP nightly.  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  He has severe neuropathy in his feet which is a hot burning sensation and he has tried both Lyrica and gabapentin for this.  He has had no gout flare ups lately.  He does have hyper aldosteronism, hypertension and onychomycosis.      No visits with results within 6 Month(s) from this visit.   Latest known visit with results is:   Office Visit on 01/15/2020   Component Date Value Ref Range Status    WBC 07/16/2020 7.8  3.8 - 10.8 Thousand/uL Final    RBC 07/16/2020 5.35  4.20 - 5.80 Million/uL Final    Hemoglobin 07/16/2020 14.5  13.2 - 17.1 g/dL Final    Hematocrit 07/16/2020 45.8  38.5 - 50.0 % Final    Mean Corpuscular Volume 07/16/2020 85.6  80.0 - 100.0 fL Final    Mean Corpuscular Hemoglobin 07/16/2020 27.1  27.0 - 33.0 pg Final    Mean Corpuscular Hemoglobin Conc 07/16/2020 31.7* 32.0 - 36.0 g/dL Final    RDW 07/16/2020 13.4  11.0 - 15.0 % Final    Platelets 07/16/2020 236  140 - 400 Thousand/uL Final    MPV 07/16/2020 10.4  7.5 - 12.5 fL Final    Neutrophils Absolute 07/16/2020 5,655  1,500 - 7,800 cells/uL Final    Lymph # 07/16/2020 1,412  850 - 3,900 cells/uL Final    Mono # 07/16/2020 523  200 - 950 cells/uL Final    Eos # 07/16/2020 133  15 - 500 cells/uL Final    Baso # 07/16/2020  78  0 - 200 cells/uL Final    Neutrophils Relative 07/16/2020 72.5  % Final    Lymph% 07/16/2020 18.1  % Final    Mono% 07/16/2020 6.7  % Final    Eosinophil% 07/16/2020 1.7  % Final    Basophil% 07/16/2020 1.0  % Final    Glucose 07/16/2020 134* 65 - 99 mg/dL Final    BUN, Bld 07/16/2020 20  7 - 25 mg/dL Final    Creatinine 07/16/2020 0.84  0.70 - 1.18 mg/dL Final    eGFR if non African American 07/16/2020 88  > OR = 60 mL/min/1.73m2 Final    eGFR if African American 07/16/2020 102  > OR = 60 mL/min/1.73m2 Final    BUN/Creatinine Ratio 07/16/2020 NOT APPLICABLE  6 - 22 (calc) Final    Sodium 07/16/2020 138  135 - 146 mmol/L Final    Potassium 07/16/2020 3.9  3.5 - 5.3 mmol/L Final    Chloride 07/16/2020 100  98 - 110 mmol/L Final    CO2 07/16/2020 31  20 - 32 mmol/L Final    Calcium 07/16/2020 9.6  8.6 - 10.3 mg/dL Final    Total Protein 07/16/2020 7.2  6.1 - 8.1 g/dL Final    Albumin 07/16/2020 4.0  3.6 - 5.1 g/dL Final    Globulin, Total 07/16/2020 3.2  1.9 - 3.7 g/dL (calc) Final    Albumin/Globulin Ratio 07/16/2020 1.3  1.0 - 2.5 (calc) Final    Total Bilirubin 07/16/2020 0.5  0.2 - 1.2 mg/dL Final    Alkaline Phosphatase 07/16/2020 39  35 - 144 U/L Final    AST 07/16/2020 30  10 - 35 U/L Final    ALT 07/16/2020 30  9 - 46 U/L Final    Cholesterol 07/16/2020 190  <200 mg/dL Final    HDL 07/16/2020 61  > OR = 40 mg/dL Final    Triglycerides 07/16/2020 95  <150 mg/dL Final    LDL Cholesterol 07/16/2020 110* mg/dL (calc) Final    Hdl/Cholesterol Ratio 07/16/2020 3.1  <5.0 (calc) Final    Non HDL Chol. (LDL+VLDL) 07/16/2020 129  <130 mg/dL (calc) Final    Color, UA 07/16/2020 YELLOW  YELLOW Final    Appearance, UA 07/16/2020 CLOUDY* CLEAR Final    Specific [place], UA 07/16/2020 1.023  1.001 - 1.035 Final    pH, UA 07/16/2020 8.0  5.0 - 8.0 Final    Glucose, UA 07/16/2020 NEGATIVE  NEGATIVE Final    Bilirubin, UA 07/16/2020 NEGATIVE  NEGATIVE Final    Ketones, UA 07/16/2020  NEGATIVE  NEGATIVE Final    Occult Blood UA 07/16/2020 NEGATIVE  NEGATIVE Final    Protein, UA 07/16/2020 NEGATIVE  NEGATIVE Final    Nitrite, UA 07/16/2020 NEGATIVE  NEGATIVE Final    Leukocytes, UA 07/16/2020 NEGATIVE  NEGATIVE Final    WBC Casts, UA 07/16/2020 0-5  < OR = 5 /HPF Final    RBC Casts, UA 07/16/2020 NONE SEEN  < OR = 2 /HPF Final    Squam Epithel, UA 07/16/2020 0-5  < OR = 5 /HPF Final    Bacteria, UA 07/16/2020 FEW* NONE SEEN /HPF Final    Amorphous, UA 07/16/2020 MODERATE* NONE OR FEW /HPF Final    Hyaline Casts, UA 07/16/2020 NONE SEEN  NONE SEEN /LPF Final    Service Cmt: 07/16/2020    Final    Reflexive Urine Culture 07/16/2020 NO CULTURE INDICATED   Final    PROSTATE SPECIFIC ANTIGEN, SCR - Q* 07/16/2020 1.4  < OR = 4.0 ng/mL Final    TSH w/reflex to FT4 07/16/2020 2.59  0.40 - 4.50 mIU/L Final    Creatinine, Random Ur 07/16/2020 130  20 - 320 mg/dL Final    Microalb, Ur 07/16/2020 0.5  See Note: mg/dL Final    Microalb Creat Ratio 07/16/2020 4  <30 mcg/mg creat Final       No past medical history on file.  Past Surgical History:   Procedure Laterality Date    CHERISE HOLE FOR SUBDURAL HEMATOMA  2019    [facility]    PENILE PROSTHESIS IMPLANT      Right shoulder repair      UVULECTOMY       No family history on file.    Marital Status:   Alcohol History:  has no history on file for alcohol.  Tobacco History:  reports that he has quit smoking. He has never used smokeless tobacco.  Drug History:  has no history on file for drug.    Review of patient's allergies indicates:   Allergen Reactions    Norvasc [amlodipine]     Penicillins     Statins-hmg-coa reductase inhibitors     Sulfa (sulfonamide antibiotics)     Sulfur Other (See Comments)       Current Outpatient Medications:     allopurinol (ZYLOPRIM) 100 MG tablet, Take 1 tablet (100 mg total) by mouth once daily., Disp: 90 tablet, Rfl: 1    clindamycin (CLEOCIN) 300 MG capsule, Take 300  mg by mouth 3 (three) times daily., Disp: , Rfl:     cycloSPORINE (RESTASIS) 0.05 % ophthalmic emulsion, Place 1 drop into both eyes 2 (two) times daily., Disp: 30 each, Rfl: 5    DULoxetine (CYMBALTA) 60 MG capsule, Take 1 capsule (60 mg total) by mouth once daily., Disp: 90 capsule, Rfl: 1    eplerenone (INSPRA) 50 MG Tab, Take 1 tablet (50 mg total) by mouth 2 (two) times daily., Disp: 180 tablet, Rfl: 1    ezetimibe (ZETIA) 10 mg tablet, Take 1 tablet (10 mg total) by mouth once daily., Disp: 90 tablet, Rfl: 1    finasteride (PROSCAR) 5 mg tablet, Take 1 tablet (5 mg total) by mouth once daily., Disp: 90 tablet, Rfl: 1    fluticasone propionate (FLONASE) 50 mcg/actuation nasal spray, 1 spray (50 mcg total) by Each Nostril route once daily., Disp: 3 Bottle, Rfl: 2    hydrALAZINE (APRESOLINE) 50 MG tablet, Take 1 tablet (50 mg total) by mouth 2 (two) times daily., Disp: 180 tablet, Rfl: 1    HYDROcodone-acetaminophen (NORCO)  mg per tablet, , Disp: , Rfl:     HYDROcodone-acetaminophen (NORCO) 5-325 mg per tablet, , Disp: , Rfl: 0    indomethacin (INDOCIN) 25 MG capsule, Take 25 mg by mouth 2 (two) times daily with meals., Disp: , Rfl:     levothyroxine (SYNTHROID) 25 MCG tablet, Take 1 tablet (25 mcg total) by mouth once daily., Disp: 90 tablet, Rfl: 1    lisinopriL-hydrochlorothiazide (ZESTORETIC) 20-25 mg Tab, Take 1 tablet by mouth 2 (two) times a day., Disp: 180 tablet, Rfl: 1    lubiprostone (AMITIZA) 24 MCG Cap, Take 24 mcg by mouth 2 (two) times daily with meals., Disp: , Rfl:     potassium chloride (MICRO-K) 10 MEQ CpSR, Take 1 capsule (10 mEq total) by mouth 2 (two) times daily., Disp: 180 capsule, Rfl: 1    Review of Systems   Constitutional: Negative for appetite change, chills, fatigue, fever and unexpected weight change.   HENT: Negative for congestion, ear pain and trouble swallowing.    Eyes: Negative for pain, discharge and visual disturbance.   Respiratory: Negative for apnea,  "cough, shortness of breath and wheezing.         Uses CPAP nightly for COURTNEY.  He does not have a current pulmonologist or a supplier for his needs for his CPAP machine.  He would like referral to a local pulmonologist.   Cardiovascular: Negative for chest pain and leg swelling.   Gastrointestinal: Negative for abdominal pain, blood in stool, constipation, diarrhea, nausea and vomiting.   Endocrine: Negative for cold intolerance, heat intolerance and polydipsia.   Genitourinary: Negative for dysuria, hematuria, testicular pain and urgency.        Has penile prosthesis.  No significant nocturia.   Musculoskeletal: Negative for gait problem, joint swelling and myalgias.   Skin:        Toenail fungus on toes bilaterally   Neurological: Negative for dizziness, seizures and numbness.   Psychiatric/Behavioral: Negative for agitation, behavioral problems and hallucinations. The patient is not nervous/anxious.           Objective:      Vitals:    07/16/20 1110   BP: 136/78   Pulse: 60   Temp: 97.6 °F (36.4 °C)   Weight: 93.4 kg (206 lb)   Height: 5' 8.5" (1.74 m)     Body mass index is 30.87 kg/m².  Physical Exam  Vitals signs and nursing note reviewed.   Constitutional:       Appearance: He is well-developed.   HENT:      Head: Normocephalic and atraumatic.      Right Ear: External ear normal.      Left Ear: External ear normal.      Nose: Nose normal.   Eyes:      Pupils: Pupils are equal, round, and reactive to light.   Neck:      Musculoskeletal: Normal range of motion and neck supple.      Thyroid: No thyromegaly.      Vascular: No carotid bruit.   Cardiovascular:      Rate and Rhythm: Normal rate and regular rhythm.      Heart sounds: Normal heart sounds. No murmur.   Pulmonary:      Effort: Pulmonary effort is normal.      Breath sounds: Normal breath sounds. No wheezing or rales.   Abdominal:      General: Bowel sounds are normal. There is no distension.      Palpations: Abdomen is soft.      Tenderness: There is no " abdominal tenderness.   Musculoskeletal: Normal range of motion.         General: No tenderness or deformity.      Lumbar back: Normal. He exhibits no pain and no spasm.      Comments: Bends 90 degrees at  waist shoulders and knees have full range of motion without crepitance.  He has no pitting edema in the lower extremities.   Lymphadenopathy:      Cervical: No cervical adenopathy.   Skin:     General: Skin is warm and dry.      Findings: No rash.   Neurological:      Mental Status: He is alert and oriented to person, place, and time.      Cranial Nerves: No cranial nerve deficit.      Coordination: Coordination normal.   Psychiatric:         Behavior: Behavior normal.         Thought Content: Thought content normal.         Judgment: Judgment normal.           Assessment:       1. COURTNEY on CPAP    2. Idiopathic chronic gout of multiple sites without tophus    3. Essential hypertension    4. Idiopathic peripheral neuropathy    5. Degeneration of lumbar intervertebral disc    6. Cervical disc disease    7. History of subdural hematoma    8. Pure hypercholesterolemia    9. BPH with obstruction/lower urinary tract symptoms    10. Hyperaldosteronism    11. Acquired hypothyroidism    12. Onychomycosis         Plan:       COURTNEY on CPAP  -     Ambulatory referral/consult to Sleep Disorders; Future; Expected date: 07/23/2020  Refer to Dr. Guicho Schroeder to set up sleep disorder follow-up and supplies for his CPAP.  Idiopathic chronic gout of multiple sites without tophus  Stable at this time  Essential hypertension  -     potassium chloride (MICRO-K) 10 MEQ CpSR; Take 1 capsule (10 mEq total) by mouth 2 (two) times daily.  Dispense: 180 capsule; Refill: 1  Blood pressure well controlled on current meds  Idiopathic peripheral neuropathy  He did not want to tried Lyrica or gabapentin again.  Degeneration of lumbar intervertebral disc    Cervical disc disease    History of subdural hematoma  Doing remarkably well post head trauma  and surgery  Pure hypercholesterolemia  Cholesterol well controlled at 190, triglycerides normal  BPH with obstruction/lower urinary tract symptoms    Hyperaldosteronism  Continue diuretics  Acquired hypothyroidism    Onychomycosis  He did not require oral medications at this time.    No follow-ups on file.

## 2020-07-20 NOTE — PROGRESS NOTES
Please call pt and let him know his blood sugar was elevated at 134 on recent labs- clarify if he was fasting for these labs and if he was I recommend A1C. The rest of his labs look good- kidney, liver, thyroid, prostate, blood count and urine within normal range. Cholesterol levels are stable- continue current medication.

## 2020-07-29 DIAGNOSIS — E26.9 HYPERALDOSTERONISM: ICD-10-CM

## 2020-07-29 RX ORDER — EPLERENONE 50 MG/1
50 TABLET, FILM COATED ORAL 2 TIMES DAILY
Qty: 180 TABLET | Refills: 1 | Status: SHIPPED | OUTPATIENT
Start: 2020-07-29 | End: 2020-01-01 | Stop reason: SDUPTHER

## 2020-07-29 NOTE — TELEPHONE ENCOUNTER
----- Message from Bri Swift sent at 7/29/2020  4:28 PM CDT -----  Regarding: refills  Eplerenone 50mg 3 month supply   [facility]   [phone]

## 2020-08-03 DIAGNOSIS — G25.81 RESTLESS LEGS: ICD-10-CM

## 2020-08-03 DIAGNOSIS — R53.83 FATIGUE: ICD-10-CM

## 2020-08-03 DIAGNOSIS — R06.83 SNORING: ICD-10-CM

## 2020-08-03 DIAGNOSIS — G47.33 OBSTRUCTIVE SLEEP APNEA: Primary | ICD-10-CM

## 2020-08-07 DIAGNOSIS — E78.00 PURE HYPERCHOLESTEROLEMIA: ICD-10-CM

## 2020-08-07 DIAGNOSIS — M10.9 GOUT, UNSPECIFIED CAUSE, UNSPECIFIED CHRONICITY, UNSPECIFIED SITE: ICD-10-CM

## 2020-08-07 RX ORDER — EZETIMIBE 10 MG/1
10 TABLET ORAL DAILY
Qty: 90 TABLET | Refills: 1 | Status: SHIPPED | OUTPATIENT
Start: 2020-08-07 | End: 2020-01-01 | Stop reason: SDUPTHER

## 2020-08-07 RX ORDER — ALLOPURINOL 100 MG/1
100 TABLET ORAL DAILY
Qty: 90 TABLET | Refills: 1 | Status: SHIPPED | OUTPATIENT
Start: 2020-08-07 | End: 2020-01-01 | Stop reason: SDUPTHER

## 2020-08-07 NOTE — TELEPHONE ENCOUNTER
----- Message from Annmarie Vigil sent at 8/7/2020  9:10 AM CDT -----  Vm- pt need refill alpurinol, ezeptimibe   [facility] [address] [facility]

## 2020-08-11 ENCOUNTER — PROCEDURE VISIT (OUTPATIENT)
Dept: SLEEP MEDICINE | Facility: HOSPITAL | Age: 70
End: 2020-08-11
Attending: INTERNAL MEDICINE
Payer: MEDICARE

## 2020-08-11 DIAGNOSIS — R06.83 SNORING: ICD-10-CM

## 2020-08-11 DIAGNOSIS — G25.81 RESTLESS LEGS: ICD-10-CM

## 2020-08-11 DIAGNOSIS — G47.33 OBSTRUCTIVE SLEEP APNEA: ICD-10-CM

## 2020-08-11 DIAGNOSIS — R53.83 FATIGUE: ICD-10-CM

## 2020-08-11 PROCEDURE — 95806 SLEEP STUDY UNATT&RESP EFFT: CPT

## 2020-08-25 NOTE — TELEPHONE ENCOUNTER
----- Message from Marcia Flowers sent at 8/25/2020  4:22 PM CDT -----   3:52 Refill Hydralazine  [facility] [address]  pt's # [phone] [facility]

## 2020-09-02 NOTE — TELEPHONE ENCOUNTER
----- Message from Noemi Galeano sent at 9/2/2020  1:37 PM CDT -----  Vm- patient is having a lot of stomach problems please give him a call he said its been a lasting a few hours . @12:55

## 2020-09-09 NOTE — TELEPHONE ENCOUNTER
Spoke with pts wife, they wanted to get in to see Dr. Mendoza regarding his [place] ER stay, they found a mass and a few other suspicious spots. She has records that she will bring, records requested as well.

## 2020-09-09 NOTE — TELEPHONE ENCOUNTER
----- Message from Bri Swift sent at 9/9/2020  9:08 AM CDT -----  Regarding: needing call back  Pt is needing a call back.   Pt [phone]

## 2020-09-11 PROBLEM — K86.89 PANCREATIC MASS: Status: ACTIVE | Noted: 2020-01-01

## 2020-09-11 NOTE — PROGRESS NOTES
This 69-year-old male developed abdominal pain and diaphoresis while dove  hunting last week.  He has been losing weight for the last 4 months without trying.  He has had 3 weeks of laryngeal abdominal pain in the midepigastric area.  We bring this on his chronic lumbar disc disease and back pain. He sees  pain management for back pain and takes hydrocodone  p.r.n..    There to the emergency room with his abdominal pain and CT scan of the abdomen and pelvis was performed.  They discovered a 5 x 4 cm spiculated pancreatic mass in the tail of the pancreas.  Some spots seen on the liver as well.  He is here for GI referral and workup of this mass.    SUBJECTIVE:    Patient ID: Michael Watson is a 69 y.o. male.    Chief Complaint: Follow-up (ER F/u // Colonoscopy - Dr. Lamberto Greco Fl // )    HPI    No visits with results within 6 Month(s) from this visit.   Latest known visit with results is:   Office Visit on 01/15/2020   Component Date Value Ref Range Status    WBC 07/16/2020 7.8  3.8 - 10.8 Thousand/uL Final    RBC 07/16/2020 5.35  4.20 - 5.80 Million/uL Final    Hemoglobin 07/16/2020 14.5  13.2 - 17.1 g/dL Final    Hematocrit 07/16/2020 45.8  38.5 - 50.0 % Final    Mean Corpuscular Volume 07/16/2020 85.6  80.0 - 100.0 fL Final    Mean Corpuscular Hemoglobin 07/16/2020 27.1  27.0 - 33.0 pg Final    Mean Corpuscular Hemoglobin Conc 07/16/2020 31.7* 32.0 - 36.0 g/dL Final    RDW 07/16/2020 13.4  11.0 - 15.0 % Final    Platelets 07/16/2020 236  140 - 400 Thousand/uL Final    MPV 07/16/2020 10.4  7.5 - 12.5 fL Final    Neutrophils Absolute 07/16/2020 5,655  1,500 - 7,800 cells/uL Final    Lymph # 07/16/2020 1,412  850 - 3,900 cells/uL Final    Mono # 07/16/2020 523  200 - 950 cells/uL Final    Eos # 07/16/2020 133  15 - 500 cells/uL Final    Baso # 07/16/2020 78  0 - 200 cells/uL Final    Neutrophils Relative 07/16/2020 72.5  % Final    Lymph% 07/16/2020 18.1  % Final    Mono% 07/16/2020  6.7  % Final    Eosinophil% 07/16/2020 1.7  % Final    Basophil% 07/16/2020 1.0  % Final    Glucose 07/16/2020 134* 65 - 99 mg/dL Final    BUN, Bld 07/16/2020 20  7 - 25 mg/dL Final    Creatinine 07/16/2020 0.84  0.70 - 1.18 mg/dL Final    eGFR if non African American 07/16/2020 88  > OR = 60 mL/min/1.73m2 Final    eGFR if African American 07/16/2020 102  > OR = 60 mL/min/1.73m2 Final    BUN/Creatinine Ratio 07/16/2020 NOT APPLICABLE  6 - 22 (calc) Final    Sodium 07/16/2020 138  135 - 146 mmol/L Final    Potassium 07/16/2020 3.9  3.5 - 5.3 mmol/L Final    Chloride 07/16/2020 100  98 - 110 mmol/L Final    CO2 07/16/2020 31  20 - 32 mmol/L Final    Calcium 07/16/2020 9.6  8.6 - 10.3 mg/dL Final    Total Protein 07/16/2020 7.2  6.1 - 8.1 g/dL Final    Albumin 07/16/2020 4.0  3.6 - 5.1 g/dL Final    Globulin, Total 07/16/2020 3.2  1.9 - 3.7 g/dL (calc) Final    Albumin/Globulin Ratio 07/16/2020 1.3  1.0 - 2.5 (calc) Final    Total Bilirubin 07/16/2020 0.5  0.2 - 1.2 mg/dL Final    Alkaline Phosphatase 07/16/2020 39  35 - 144 U/L Final    AST 07/16/2020 30  10 - 35 U/L Final    ALT 07/16/2020 30  9 - 46 U/L Final    Cholesterol 07/16/2020 190  <200 mg/dL Final    HDL 07/16/2020 61  > OR = 40 mg/dL Final    Triglycerides 07/16/2020 95  <150 mg/dL Final    LDL Cholesterol 07/16/2020 110* mg/dL (calc) Final    Hdl/Cholesterol Ratio 07/16/2020 3.1  <5.0 (calc) Final    Non HDL Chol. (LDL+VLDL) 07/16/2020 129  <130 mg/dL (calc) Final    Color, UA 07/16/2020 YELLOW  YELLOW Final    Appearance, UA 07/16/2020 CLOUDY* CLEAR Final    Specific [place], UA 07/16/2020 1.023  1.001 - 1.035 Final    pH, UA 07/16/2020 8.0  5.0 - 8.0 Final    Glucose, UA 07/16/2020 NEGATIVE  NEGATIVE Final    Bilirubin, UA 07/16/2020 NEGATIVE  NEGATIVE Final    Ketones, UA 07/16/2020 NEGATIVE  NEGATIVE Final    Occult Blood UA 07/16/2020 NEGATIVE  NEGATIVE Final    Protein, UA 07/16/2020 NEGATIVE  NEGATIVE Final     Nitrite, UA 07/16/2020 NEGATIVE  NEGATIVE Final    Leukocytes, UA 07/16/2020 NEGATIVE  NEGATIVE Final    WBC Casts, UA 07/16/2020 0-5  < OR = 5 /HPF Final    RBC Casts, UA 07/16/2020 NONE SEEN  < OR = 2 /HPF Final    Squam Epithel, UA 07/16/2020 0-5  < OR = 5 /HPF Final    Bacteria, UA 07/16/2020 FEW* NONE SEEN /HPF Final    Amorphous, UA 07/16/2020 MODERATE* NONE OR FEW /HPF Final    Hyaline Casts, UA 07/16/2020 NONE SEEN  NONE SEEN /LPF Final    Service Cmt: 07/16/2020    Final    Reflexive Urine Culture 07/16/2020 NO CULTURE INDICATED   Final    PROSTATE SPECIFIC ANTIGEN, SCR - Q* 07/16/2020 1.4  < OR = 4.0 ng/mL Final    TSH w/reflex to FT4 07/16/2020 2.59  0.40 - 4.50 mIU/L Final    Creatinine, Random Ur 07/16/2020 130  20 - 320 mg/dL Final    Microalb, Ur 07/16/2020 0.5  See Note: mg/dL Final    Microalb Creat Ratio 07/16/2020 4  <30 mcg/mg creat Final       No past medical history on file.  Social History     Socioeconomic History    Marital status:      Spouse name: Not on file    Number of children: Not on file    Years of education: Not on file    Highest education level: Not on file   Occupational History    Not on file   Social Needs    Financial resource strain: Not on file    Food insecurity     Worry: Not on file     Inability: Not on file    Transportation needs     Medical: Not on file     Non-medical: Not on file   Tobacco Use    Smoking status: Former Smoker    Smokeless tobacco: Never Used   Substance and Sexual Activity    Alcohol use: Not on file    Drug use: Not on file    Sexual activity: Not on file   Lifestyle    Physical activity     Days per week: Not on file     Minutes per session: Not on file    Stress: Not on file   Relationships    Social connections     Talks on phone: Not on file     Gets together: Not on file     Attends Mandaeism service: Not on file     Active member of club or organization: Not on file     Attends meetings of clubs or  organizations: Not on file     Relationship status: Not on file   Other Topics Concern    Not on file   Social History Narrative    Not on file     Past Surgical History:   Procedure Laterality Date    CHERISE HOLE FOR SUBDURAL HEMATOMA  2019    [facility].    PENILE PROSTHESIS IMPLANT      Right shoulder repair      UVULECTOMY       No family history on file.    Review of patient's allergies indicates:   Allergen Reactions    Norvasc [amlodipine]     Penicillins     Statins-hmg-coa reductase inhibitors     Sulfa (sulfonamide antibiotics)     Sulfur Other (See Comments)       Current Outpatient Medications:     allopurinoL (ZYLOPRIM) 100 MG tablet, Take 1 tablet (100 mg total) by mouth once daily., Disp: 90 tablet, Rfl: 1    coenzyme Q10 (CO Q-10) 100 mg capsule, Take 100 mg by mouth once daily., Disp: , Rfl:     DULoxetine (CYMBALTA) 60 MG capsule, Take 1 capsule (60 mg total) by mouth once daily., Disp: 90 capsule, Rfl: 1    eplerenone (INSPRA) 50 MG Tab, Take 1 tablet (50 mg total) by mouth 2 (two) times daily., Disp: 180 tablet, Rfl: 1    ezetimibe (ZETIA) 10 mg tablet, Take 1 tablet (10 mg total) by mouth once daily., Disp: 90 tablet, Rfl: 1    famotidine (PEPCID) 20 MG tablet, Take 20 mg by mouth 2 (two) times daily. , Disp: , Rfl:     finasteride (PROSCAR) 5 mg tablet, Take 1 tablet (5 mg total) by mouth once daily., Disp: 90 tablet, Rfl: 1    hydrALAZINE (APRESOLINE) 50 MG tablet, Take 1 tablet (50 mg total) by mouth 2 (two) times daily., Disp: 180 tablet, Rfl: 1    HYDROcodone-acetaminophen (NORCO)  mg per tablet, Take 1 tablet by mouth daily as needed. , Disp: , Rfl:     krill/om3/dha/epa/om6/lip/astx (KRILL OIL, OMEGA 3 AND 6, ORAL), Take by mouth., Disp: , Rfl:     levothyroxine (SYNTHROID) 25 MCG tablet, Take 1 tablet (25 mcg total) by mouth once daily., Disp: 90 tablet, Rfl: 1    lisinopriL-hydrochlorothiazide (ZESTORETIC) 20-25 mg Tab, Take 1 tablet  by mouth 2 (two) times a day., Disp: 180 tablet, Rfl: 1    ondansetron (ZOFRAN-ODT) 4 MG TbDL, 4 mg every 6 (six) hours as needed. , Disp: , Rfl:     pantoprazole (PROTONIX) 40 MG tablet, Take 40 mg by mouth once daily. , Disp: , Rfl:     potassium chloride (MICRO-K) 10 MEQ CpSR, Take 1 capsule (10 mEq total) by mouth 2 (two) times daily., Disp: 180 capsule, Rfl: 1    sucralfate (CARAFATE) 1 gram tablet, 1 g 4 (four) times daily before meals and nightly. , Disp: , Rfl:     cycloSPORINE (RESTASIS) 0.05 % ophthalmic emulsion, Place 1 drop into both eyes 2 (two) times daily., Disp: 30 each, Rfl: 5    fluticasone propionate (FLONASE) 50 mcg/actuation nasal spray, 1 spray (50 mcg total) by Each Nostril route once daily., Disp: 3 Bottle, Rfl: 2    indomethacin (INDOCIN) 25 MG capsule, Take 25 mg by mouth 2 (two) times daily with meals., Disp: , Rfl:     lubiprostone (AMITIZA) 24 MCG Cap, Take 1 capsule (24 mcg total) by mouth 2 (two) times daily with meals., Disp: 60 capsule, Rfl: 3    Review of Systems   Constitutional: Positive for unexpected weight change. Negative for appetite change, chills, fatigue and fever.   HENT: Negative for congestion, ear pain and trouble swallowing.    Eyes: Negative for pain, discharge and visual disturbance.   Respiratory: Negative for apnea, cough, shortness of breath and wheezing.    Cardiovascular: Negative for chest pain and leg swelling.   Gastrointestinal: Positive for abdominal pain (Mid epigastric pain) and constipation ( uses Amitiza and MiraLax). Negative for blood in stool, diarrhea, nausea and vomiting.   Endocrine: Negative for cold intolerance, heat intolerance and polydipsia.   Genitourinary: Negative for dysuria, hematuria, testicular pain and urgency.   Musculoskeletal: Positive for back pain ( disc disease lumbar). Negative for gait problem, joint swelling and myalgias.   Neurological: Negative for dizziness, seizures and numbness.   Psychiatric/Behavioral:  "Negative for agitation, behavioral problems and hallucinations. The patient is not nervous/anxious.           Objective:      Vitals:    09/11/20 0814 09/11/20 0816   BP: (!) 146/82 (!) 158/84   Pulse: 76    Weight: 91.2 kg (201 lb)    Height: 5' 8.5" (1.74 m)      Physical Exam  Vitals signs and nursing note reviewed.   Constitutional:       Appearance: He is well-developed. He is obese.   HENT:      Head: Normocephalic and atraumatic.      Right Ear: External ear normal.      Left Ear: External ear normal.      Nose: Nose normal.   Eyes:      Pupils: Pupils are equal, round, and reactive to light.   Neck:      Musculoskeletal: Normal range of motion and neck supple.      Thyroid: No thyromegaly.      Vascular: No carotid bruit.   Cardiovascular:      Rate and Rhythm: Normal rate and regular rhythm.      Heart sounds: Normal heart sounds. No murmur.   Pulmonary:      Effort: Pulmonary effort is normal.      Breath sounds: Normal breath sounds. No wheezing or rales.   Abdominal:      General: Bowel sounds are normal. There is no distension.      Palpations: Abdomen is soft.      Tenderness: There is abdominal tenderness (Patient has tenderness in the midepigastric region and right upper quadrant.  Liver is not enlarged).   Musculoskeletal: Normal range of motion.         General: No tenderness or deformity.      Lumbar back: Normal. He exhibits no pain and no spasm.      Comments: Bends 90 degrees at  waist   Lymphadenopathy:      Cervical: No cervical adenopathy.   Skin:     General: Skin is warm and dry.      Coloration: Skin is not jaundiced or pale.      Findings: No rash.   Neurological:      Mental Status: He is alert and oriented to person, place, and time.      Cranial Nerves: No cranial nerve deficit.      Coordination: Coordination normal.   Psychiatric:         Behavior: Behavior normal.         Thought Content: Thought content normal.         Judgment: Judgment normal.           Assessment:       1. " Pancreatic mass    2. Essential hypertension    3. Acquired hypothyroidism    4. BPH with obstruction/lower urinary tract symptoms    5. Pure hypercholesterolemia    6. Degeneration of lumbar intervertebral disc    7. Gout, unspecified cause, unspecified chronicity, unspecified site    8. Hyperaldosteronism    9. Drug-induced constipation    10. Need for influenza vaccination    11. History of subdural hematoma         Plan:       Pancreatic mass  Patient is a 4 x 5 cm pancreatic mass.  Weight loss associated.  Refer to GI with  and [place] for endoscopic ultrasound and biopsy of the pancreas  Essential hypertension  -     potassium chloride (MICRO-K) 10 MEQ CpSR; Take 1 capsule (10 mEq total) by mouth 2 (two) times daily.  Dispense: 180 capsule; Refill: 1  -     lisinopriL-hydrochlorothiazide (ZESTORETIC) 20-25 mg Tab; Take 1 tablet by mouth 2 (two) times a day.  Dispense: 180 tablet; Refill: 1  -     hydrALAZINE (APRESOLINE) 50 MG tablet; Take 1 tablet (50 mg total) by mouth 2 (two) times daily.  Dispense: 180 tablet; Refill: 1  Blood pressure is slightly high due to pain  Acquired hypothyroidism  -     levothyroxine (SYNTHROID) 25 MCG tablet; Take 1 tablet (25 mcg total) by mouth once daily.  Dispense: 90 tablet; Refill: 1  Refill meds  BPH with obstruction/lower urinary tract symptoms  -     finasteride (PROSCAR) 5 mg tablet; Take 1 tablet (5 mg total) by mouth once daily.  Dispense: 90 tablet; Refill: 1    Pure hypercholesterolemia  -     ezetimibe (ZETIA) 10 mg tablet; Take 1 tablet (10 mg total) by mouth once daily.  Dispense: 90 tablet; Refill: 1    Degeneration of lumbar intervertebral disc  -     DULoxetine (CYMBALTA) 60 MG capsule; Take 1 capsule (60 mg total) by mouth once daily.  Dispense: 90 capsule; Refill: 1    Gout, unspecified cause, unspecified chronicity, unspecified site  -     allopurinoL (ZYLOPRIM) 100 MG tablet; Take 1 tablet (100 mg total) by mouth once daily.  Dispense: 90  tablet; Refill: 1    Hyperaldosteronism  -     eplerenone (INSPRA) 50 MG Tab; Take 1 tablet (50 mg total) by mouth 2 (two) times daily.  Dispense: 180 tablet; Refill: 1    Drug-induced constipation  -     lubiprostone (AMITIZA) 24 MCG Cap; Take 1 capsule (24 mcg total) by mouth 2 (two) times daily with meals.  Dispense: 60 capsule; Refill: 3  Start Amitiza again  Need for influenza vaccination  -     Influenza - Quadrivalent - High Dose (65+) (PF) (IM)  Flu vaccine today  History of subdural hematoma      No follow-ups on file.        9/11/2020 Peter Mendoza

## 2020-09-16 NOTE — DISCHARGE INSTRUCTIONS
To confirm, Your doctor has instructed you that procedure is scheduled for: September 17    1 Person can come with you the day of surgery     Endoscopy nurse will call the afternoon prior to surgery between 4:00 and 6:00 PM with the final arrival time.      Enter at [facility] [address] and come through front entrance.  You will be screened/ have temperature checked.    You may have 1 visitor who will also be screened.     Check in at registration.   After registration, proceed past gift shop and through glass door ( [facility] [place]) Check in at the nurses station to the left.   Do not eat or drink anything after midnight the night before your surgery - THIS INCLUDES  WATER, GUM, MINTS AND CANDY.  YOU MAY BRUSH YOUR TEETH BUT DO NOT SWALLOW     TAKE ONLY THESE MEDICATIONS WITH A SMALL SIP OF WATER THE MORNING OF YOUR PROCEDURE:  eplerenone  Hydralazine     BRING CPAP    PLEASE NOTE:  The surgery schedule has many variables which may affect the time of your surgery case.  Family members should be available if your surgery time changes.  Plan to be here the day of your procedure between 4-6 hours.      DO NOT TAKE THESE MEDICATIONS 5-7 DAYS PRIOR to your procedure or per your surgeon's request: ASPIRIN, ALEVE, ADVIL, IBUPROFEN,  NIMO SELTZER, BC , FISH OIL , VITAMIN E, HERBALS  (May take Tylenol)                                                          IMPORTANT INSTRUCTIONS      · Do not smoke, vape or drink alcoholic beverages 24 hours prior to your procedure.  · Shower the night before  and sleep in a bed with clean sheets.  Do not sleep with a pet in the bed.     · If you wear contact lenses, dentures, hearing aids or glasses, bring a container to put them in during surgery and give to a family member for safe keeping.    · Please leave all jewelry, piercing's and valuables at home.   · Wear rubber soled shoes (no flip flops).  · ONLY for patients requiring bowel prep, written instructions will be given by your  doctor's office.  · If your doctor has scheduled you for an overnight stay, bring a small overnight bag with any personal items you need.    · Make arrangements in advance for transportation home by a responsible adult.      · You must make arrangements for transportation, TAXI'S, UBER'S OR LYFTS ARE NOT ALLOWED.        If you have any questions about these instructions, call (Monday - Friday)  Endoscopy [phone]

## 2020-09-17 NOTE — ANESTHESIA POSTPROCEDURE EVALUATION
Anesthesia Post Evaluation    Patient: Michael Watson    Procedure(s) Performed: Procedure(s) (LRB):  ULTRASOUND, UPPER GI TRACT, ENDOSCOPIC (N/A)    Final Anesthesia Type: MAC    Patient location during evaluation: GI PACU  Patient participation: Yes- Able to Participate  Level of consciousness: awake and alert  Post-procedure vital signs: reviewed and stable  Pain management: adequate  Airway patency: patent    PONV status at discharge: No PONV  Anesthetic complications: no      Cardiovascular status: hemodynamically stable  Respiratory status: unassisted, spontaneous ventilation and room air  Hydration status: euvolemic  Follow-up not needed.          Vitals Value Taken Time   /88 09/17/20 1231   Temp  09/17/20 1242   Pulse 82 09/17/20 1233   Resp 15 09/17/20 1233   SpO2 97 % 09/17/20 1233   Vitals shown include unvalidated device data.      No case tracking events are documented in the log.      Pain/Maura Score: Maura Score: 10 (9/17/2020 12:30 PM)

## 2020-09-17 NOTE — DISCHARGE INSTRUCTIONS
Recovery After Procedural Sedation (Adult)   You have been given medicine by vein to make you sleep during your surgery. This may have included both a pain medicine and sleeping medicine. Most of the effects have worn off. But you may still have some drowsiness for the next 6 to 8 hours.  Home care  Follow these guidelines when you get home:  · For the next 8 hours, you should be watched by a responsible adult. This person should make sure your condition is not getting worse.  · Don't drink any alcohol for the next 24 hours.  · Don't drive, operate dangerous machinery, or make important business or personal decisions during the next 24 hours.  · To prevent injury or falls, use caution when standing and walking for at least 24 hours after your procedure.  Note: Your healthcare provider may tell you not to take any medicine by mouth for pain or sleep in the next 4 hours. These medicines may react with the medicines you were given in the hospital. This could cause a much stronger response than usual.  Follow-up care  Follow up with your healthcare provider if you are not alert and back to your usual level of activity within 12 hours.  When to seek medical advice  Call your healthcare provider right away if any of these occur:  · Drowsiness gets worse  · Weakness or dizziness gets worse  · Repeated vomiting  · You can't be awakened  · Fever  · New rash  [employer] last reviewed this educational content on 9/1/2019  © 4649-1689 [employer]. [address] [place] [id number]. All rights reserved. This information is not intended as a substitute for professional medical care. Always follow your healthcare professional's instructions.        Endoscopic Ultrasound (EUS)    An endoscopic ultrasound (EUS) is a test to look at the inside of your gastrointestinal (GI) tract. It's commonly used to look for cancers or growths in the esophagus, stomach, pancreas, liver, and rectum. It can help to stage cancer  (see how advanced a cancer is). EUS may also be used to help diagnose certain diseases or to drain cysts or abscesses.  What is EUS?  EUS shows both ultrasound images and live video of the GI tract. During the test, a flexible tube called an endoscope (scope) is used. At the end of the scope is a tiny video camera and light. The video camera sends live images to a monitor. The scope also contains a very small ultrasound device. This uses sound waves to create images and send them to a monitor.  A needle is passed through the scope. The needle can be used take a small sample of tissue for testing. This is called a biopsy. The needle can be used to take a sample of fluid. This is called fine-needle aspiration (FNA).  Risks and possible complications of EUS  Risks and possible complications include the following:  · Bleeding  · Infection  · A perforation (hole) in the digestive tract   · Risks of sedation or anesthesia   Before the test  Be prepared prior to the test:  · Tell your healthcare provider what medicine you take. This includes vitamins, herbs, and over-the-counter medicine. It also includes any blood thinners, such as warfarin, clopidogrel, ibuprofen, or daily aspirin. Ask your healthcare provider if you need to stop taking some or all of them before the test.  · You may be prescribed antibiotics to take before or after the test. This depends on the area being studied and what is done during the test. These medicines help prevent infection.  · Carefully follow the instructions for preparing for the test to make sure results are accurate. Instructions may include:  ¨ If youre having an EUS of the upper GI tract (esophagus, stomach, duodenum, pancreas, liver):  § Do not eat or drink for 6 hours before the test.  ¨ If youre having an EUS of the lower GI tract (rectum):  § Before the test, do bowel prep as instructed to clean your rectum of stool. This may involve a clear liquid diet and using a laxative  (liquid or pills) the night before the test. Or it may mean doing one or more enemas the morning of the test.  § Do not eat or drink for 6 hours before the test.  · Be sure to arrive on time at the facility. Bring your identification and health insurance card. Leave valuables at home. If you have them, bring X-rays or other test results with you.  Let the healthcare provider know  For your safety, tell the healthcare provider if you:  · Take insulin. Your dose may need to be changed on the day of your test.  · Are allergic to latex.  · Have any other allergies.  · Are taking blood thinners.   During the test  An endoscopic ultrasound usually takes place in a hospital. The procedure itself may take 1 to 2 hours. You will likely go home soon afterward. During the test:  · You lie on your left side on an exam table.  · An intravenous (IV) line will be put into a vein in your arm or hand. This line supplies fluids and medicines. To keep you comfortable during the test, you will be given a sedative medicine. This medicine prevents discomfort and will make you sleepy.  · If you are having an EUS of the upper GI tract, local anesthetic may be sprayed in your throat. This will help you be more comfortable as the healthcare provider inserts the scope. The healthcare provider then gently puts the flexible scope into your mouth or nose and down your throat.  · If youre having an EUS of the lower GI tract, the healthcare provider gently puts the flexible scope into your anus.  · During the test, the scope sends live video and ultrasound images from inside your body to nearby monitors. These are used to examine your GI tract. Specialized procedures, such as drainage, are done as needed.  · The healthcare provider may discuss the results with you soon after the test. Biopsy results take several  days.  · In most cases, you can go home within a few hours of the test. When you leave the facility, have an adult family member or  friend drive you, even if you don't feel that sleepy.  After the test  Here is what to expect after the test:  · You may feel tired from the sedative. This should wear off by the end of the day.  · If you had an upper digestive endoscopy, your throat may feel sore for a day or two. Over-the-counter sore throat lozenges and spray should help.  · You can eat and drink normally as soon as the test is done.  When to call the healthcare provider  Call your healthcare provider if you notice any of the following:  · Fever of 100.4°F (38.0°C) or higher, or as advised by your healthcare provider  · Shortness of breath  · Vomiting blood, blood in stool, or black stools  · Coughing or hoarse voice that wont go away   Date Last Reviewed: 7/1/2016  © 7310-3919 "[employer] [address] [place] [address], [place] [address]. All rights reserved. This information is not intended as a substitute for professional medical care. Always follow your healthcare professional's instructions.

## 2020-09-17 NOTE — H&P
GASTROENTEROLOGY PRE-PROCEDURE H&P NOTE  Patient Name: Michael Watson  Patient MRN: [id number]  Patient : 1950    Service date: 2020    PCP: Peter Mendoza MD    No chief complaint on file.      HPI: Patient is a 69 y.o. male with PMHx as below here for evaluation of pancreatic body mass     Past Medical History:  Past Medical History:   Diagnosis Date    Brain bleed     Chronically low serum potassium     growth on kidney     Diverticulosis     Hypertension     Left-sided weakness     from previous brain events    Mass of pancreas 2020    Neuropathy     Skin cancer     Subdural hematoma 2019    surgery    Thyroid disease         Past Surgical History:  Past Surgical History:   Procedure Laterality Date    CHERISE HOLE FOR SUBDURAL HEMATOMA  2019    [facility].    PENILE PROSTHESIS IMPLANT      Right shoulder repair      UVULECTOMY      removed hyoid bone         Home Medications:  Medications Prior to Admission   Medication Sig Dispense Refill Last Dose    allopurinoL (ZYLOPRIM) 100 MG tablet Take 1 tablet (100 mg total) by mouth once daily. 90 tablet 1 2020 at Unknown time    cycloSPORINE (RESTASIS) 0.05 % ophthalmic emulsion Place 1 drop into both eyes 2 (two) times daily. 30 each 5 2020 at Unknown time    DULoxetine (CYMBALTA) 60 MG capsule Take 1 capsule (60 mg total) by mouth once daily. 90 capsule 1 2020 at Unknown time    eplerenone (INSPRA) 50 MG Tab Take 1 tablet (50 mg total) by mouth 2 (two) times daily. 180 tablet 1 2020 at Unknown time    ezetimibe (ZETIA) 10 mg tablet Take 1 tablet (10 mg total) by mouth once daily. 90 tablet 1 2020 at Unknown time    famotidine (PEPCID) 20 MG tablet Take 20 mg by mouth 2 (two) times daily.    2020 at Unknown time    finasteride (PROSCAR) 5 mg tablet Take 1 tablet (5 mg total) by mouth once daily. 90 tablet 1 2020 at Unknown time    fluticasone propionate (FLONASE)  50 mcg/actuation nasal spray 1 spray (50 mcg total) by Each Nostril route once daily. 3 Bottle 2 9/16/2020 at Unknown time    hydrALAZINE (APRESOLINE) 50 MG tablet Take 1 tablet (50 mg total) by mouth 2 (two) times daily. 180 tablet 1 9/17/2020 at Unknown time    HYDROcodone-acetaminophen (NORCO)  mg per tablet Take 1 tablet by mouth daily as needed.    9/17/2020 at Unknown time    levothyroxine (SYNTHROID) 25 MCG tablet Take 1 tablet (25 mcg total) by mouth once daily. 90 tablet 1 9/16/2020 at Unknown time    lisinopriL-hydrochlorothiazide (ZESTORETIC) 20-25 mg Tab Take 1 tablet by mouth 2 (two) times a day. 180 tablet 1 9/16/2020 at Unknown time    lubiprostone (AMITIZA) 24 MCG Cap Take 1 capsule (24 mcg total) by mouth 2 (two) times daily with meals. 60 capsule 3 9/16/2020 at Unknown time    pantoprazole (PROTONIX) 40 MG tablet Take 40 mg by mouth once daily.    9/16/2020 at Unknown time    potassium chloride (MICRO-K) 10 MEQ CpSR Take 1 capsule (10 mEq total) by mouth 2 (two) times daily. 180 capsule 1 9/17/2020 at Unknown time    sucralfate (CARAFATE) 1 gram tablet 1 g 4 (four) times daily before meals and nightly.    9/16/2020 at Unknown time    coenzyme Q10 (CO Q-10) 100 mg capsule Take 100 mg by mouth once daily.       indomethacin (INDOCIN) 25 MG capsule Take 25 mg by mouth 2 (two) times daily as needed.    Unknown at Unknown time    krill/om3/dha/epa/om6/lip/astx (KRILL OIL, OMEGA 3 AND 6, ORAL) Take by mouth.       ondansetron (ZOFRAN-ODT) 4 MG TbDL 4 mg every 6 (six) hours as needed.    Unknown at Unknown time       Inpatient Medications:        Review of patient's allergies indicates:   Allergen Reactions    Norvasc [amlodipine]     Penicillins     Statins-hmg-coa reductase inhibitors     Sulfa (sulfonamide antibiotics)     Sulfur Other (See Comments)       Social History:   Social History     Occupational History    Not on file   Tobacco Use    Smoking status: Former Smoker      "Quit date:      Years since quittin.    Smokeless tobacco: Never Used   Substance and Sexual Activity    Alcohol use: Not Currently    Drug use: Yes     Types: Marijuana    Sexual activity: Not on file       Family History:   History reviewed. No pertinent family history.    Review of Systems:  A 10 point review of systems was performed and was normal, except as mentioned in the HPI, including constitutional, HEENT, heme, lymph, cardiovascular, respiratory, gastrointestinal, genitourinary, neurologic, endocrine, psychiatric and musculoskeletal.      OBJECTIVE:    Physical Exam:  24 Hour Vital Sign Ranges: Temp:  [89 °F (31.7 °C)] 89 °F (31.7 °C)  Pulse:  [84] 84  Resp:  [16] 16  SpO2:  [97 %] 97 %  BP: (148)/(68) 148/68  Most recent vitals: BP (!) 148/68   Pulse 84   Temp (!) 89 °F (31.7 °C) (Oral)   Resp 16   Ht 5' 8" (1.727 m)   Wt 86.2 kg (190 lb)   SpO2 97%   BMI 28.89 kg/m²    GEN: well-developed, well-nourished, awake and alert, non-toxic appearing adult  HEENT: PERRL, sclera anicteric, oral mucosa pink and moist without lesion  NECK: trachea midline; Good ROM  CV: regular rate and rhythm, no murmurs or gallops  RESP: clear to auscultation bilaterally, no wheezes, rhonci or rales  ABD: soft, non-tender, non-distended, normal bowel sounds  EXT: no swelling or edema, 2+ pulses distally  SKIN: no rashes or jaundice  PSYCH: normal affect    Labs:   No results for input(s): WBC, MCV, PLT in the last 72 hours.    Invalid input(s): HGBAU  No results for input(s): NA, K, CL, CO2, BUN, GLU in the last 72 hours.    Invalid input(s): CREA  No results for input(s): ALB in the last 72 hours.    Invalid input(s): ALKP, SGOT, SGPT, TBIL, DBIL, TPRO  No results for input(s): PT, INR, PTT in the last 72 hours.      IMPRESSION / RECOMMENDATIONS:  EGD / EUS w/ FNA.   Risks, benefits, alternative discussed in detail with patient / family regarding anticipated procedure and possible complications.     Massimo MORENO" Dauter[place] III  9/17/2020  11:22 AM

## 2020-09-17 NOTE — [employer] PATIENT INSTRUCTIONS
Discharge Summary/Instructions after an Endoscopic Procedure  Patient Name: Michael Watson  Patient MRN: [id number]  Patient YOB: 1950 Thursday, September 17, 2020  Massimo Figueroa III, MD  RESTRICTIONS:  During your procedure today, you received medications for sedation.  These   medications may affect your judgment, balance and coordination.  Therefore,   for 24 hours, you have the following restrictions:   - DO NOT drive a car, operate machinery, make legal/financial decisions,   sign important papers or drink alcohol.    ACTIVITY:  Today: no heavy lifting, straining or running due to procedural   sedation/anesthesia.  The following day: return to full activity including work.  DIET:  Eat and drink normally unless instructed otherwise.     TREATMENT FOR COMMON SIDE EFFECTS:  - Mild abdominal pain, nausea, belching, bloating or excessive gas:  rest,   eat lightly and use a heating pad.  - Sore Throat: treat with throat lozenges and/or gargle with warm salt   water.  - Because air was used during the procedure, expelling large amounts of air   from your rectum or belching is normal.  - If a bowel prep was taken, you may not have a bowel movement for 1-3 days.    This is normal.  SYMPTOMS TO WATCH FOR AND REPORT TO YOUR PHYSICIAN:  1. Abdominal pain or bloating, other than gas cramps.  2. Chest pain.  3. Back pain.  4. Signs of infection such as: chills or fever occurring within 24 hours   after the procedure.  5. Rectal bleeding, which would show as bright red, maroon, or black stools.   (A tablespoon of blood from the rectum is not serious, especially if   hemorrhoids are present.)  6. Vomiting.  7. Weakness or dizziness.  GO DIRECTLY TO THE NEAREST EMERGENCY ROOM IF YOU HAVE ANY OF THE FOLLOWING:      Difficulty breathing              Chills and/or fever over 101 F   Persistent vomiting and/or vomiting blood   Severe abdominal pain   Severe chest pain   Black, tarry stools   Bleeding- more than one  tablespoon   Any other symptom or condition that you feel may need urgent attention  Your doctor recommends these additional instructions:  If any biopsies were taken, your doctors clinic will contact you in 1 to 2   weeks with any results.  - Discharge patient to home.   - Patient has a contact number available for emergencies.  The signs and   symptoms of potential delayed complications were discussed with the   patient.  Return to normal activities tomorrow.  Written discharge   instructions were provided to the patient.   - Resume regular diet.   - Continue present medications.  For questions, problems or results please call your physician - Massimo Figueroa III, MD at Work:  [phone].  [facility], EMERGENCY ROOM PHONE NUMBER: [phone]  IF A COMPLICATION OR EMERGENCY SITUATION ARISES AND YOU ARE UNABLE TO REACH   YOUR PHYSICIAN - GO DIRECTLY TO THE EMERGENCY ROOM.  Massimo Figueroa III, MD  9/17/2020 11:57:04 AM  This report has been verified and signed electronically.  [employer]

## 2020-09-17 NOTE — ANESTHESIA PREPROCEDURE EVALUATION
09/17/2020  Michael Watson is a 69 y.o., male.      Patient Active Problem List   Diagnosis    COURTNEY on CPAP    Essential hypertension    Idiopathic peripheral neuropathy    Degeneration of lumbar intervertebral disc    BPH with obstruction/lower urinary tract symptoms    Pure hypercholesterolemia    Special screening for malignant neoplasms, colon    Cervical disc disease    Subdural hematoma without coma    History of subdural hematoma    Hyperaldosteronism    Gout    Acquired hypothyroidism    Onychomycosis    Pancreatic mass       Past Surgical History:   Procedure Laterality Date    CHERISE HOLE FOR SUBDURAL HEMATOMA  2019    [facility].    PENILE PROSTHESIS IMPLANT      Right shoulder repair      UVULECTOMY      removed hyoid bone         Tobacco Use:  The patient  reports that he quit smoking about 20 years ago. He has never used smokeless tobacco.     No results found for this or any previous visit.          Lab Results   Component Value Date    WBC 7.8 07/16/2020    HGB 14.5 07/16/2020    HCT 45.8 07/16/2020    MCV 85.6 07/16/2020     07/16/2020     BMP  Lab Results   Component Value Date     07/16/2020    K 3.9 07/16/2020     07/16/2020    CO2 31 07/16/2020    BUN 20 07/16/2020    CREATININE 0.84 07/16/2020    CALCIUM 9.6 07/16/2020     (H) 07/16/2020       No results found for this or any previous visit.          Anesthesia Evaluation    I have reviewed the Patient Summary Reports.    I have reviewed the Nursing Notes. I have reviewed the NPO Status.   I have reviewed the Medications.     Review of Systems  Anesthesia Hx:  Denies Family Hx of Anesthesia complications.   Denies Personal Hx of Anesthesia complications.   Social:  Former Smoker    Hematology/Oncology:        Current/Recent Cancer.   Cardiovascular:   Hypertension  ECG has been reviewed.    Pulmonary:   Sleep Apnea, CPAP    Education provided regarding risk of obstructive sleep apnea     Hepatic/GI:   GERD    Musculoskeletal:   Arthritis     Neurological:   Peripheral Neuropathy    Endocrine:   Hypothyroidism        Physical Exam  General:  Well nourished    Airway/Jaw/Neck:  Airway Findings: Mouth Opening: Normal Tongue: Normal  General Airway Assessment: Adult  Mallampati: II  TM Distance: Normal, at least 6 cm  Jaw/Neck Findings:  Neck ROM: Normal ROM      Dental:  Dental Findings: In tact   Chest/Lungs:  Chest/Lungs Findings: Clear to auscultation, Normal Respiratory Rate     Heart/Vascular:  Heart Findings: Rate: Normal  Rhythm: Regular Rhythm  Sounds: Normal        Mental Status:  Mental Status Findings:  Alert and Oriented         Anesthesia Plan  Type of Anesthesia, risks & benefits discussed:  Anesthesia Type:  MAC  Patient's Preference:   Intra-op Monitoring Plan: standard ASA monitors  Intra-op Monitoring Plan Comments:   Post Op Pain Control Plan:   Post Op Pain Control Plan Comments:   Induction:    Beta Blocker:         Informed Consent: Patient understands risks and agrees with Anesthesia plan.  Questions answered. Anesthesia consent signed with patient.  ASA Score: 2     Day of Surgery Review of History & Physical:            Ready For Surgery From Anesthesia Perspective.

## 2020-10-02 PROBLEM — S06.5XAA SUBDURAL HEMATOMA WITHOUT COMA: Status: RESOLVED | Noted: 2019-11-15 | Resolved: 2020-01-01

## 2020-10-02 PROBLEM — Z12.11 SPECIAL SCREENING FOR MALIGNANT NEOPLASMS, COLON: Status: RESOLVED | Noted: 2019-11-14 | Resolved: 2020-01-01

## 2020-10-02 PROBLEM — C25.1 PRIMARY ADENOCARCINOMA OF BODY OF PANCREAS: Status: ACTIVE | Noted: 2020-01-01

## 2020-10-02 NOTE — PROGRESS NOTES
Mri   Encounter Date:  10/5/2020    Patient ID: Michael Watson  Age:  69 y.o. :  1950     Chief Complaint   Patient presents with    Consult     History:    Mr. Watson is a 69 y.o. male who presents with body of pancreas CA.  He arrives to clinic from [place], [place], accompanied by his wife. He brought discs today with outside images from CT and PET scans. He had PPCT scan today here.     Referred by: Dr. Hendricks    Reviewed outside medical records.   He presented to [facility] ED 2020 with worsening abd pain. CT scan found 5 x 4 cm mass in tail on pancreas as well as soft tissue densities concerning for peritoneal implants and a liver lesion. EUS per Dr. Figueroa found 3.5 cm mass in body of pancreas with evidence of encasement of SMV and SMA. Biopsy done and pathology confirmed LIBIA. CA 19-9 quite elevated. Outside PET scan done Friday with evidence of metastatic disease.     He suffers with chronic back pain, on Norco Rx for past several years per pain mgmt. C/o chronic constipation attributed to this. He states he has a good appetite but decreased po intake r/t worsening abd pain post prandially. Denies N/V/D. He believes he has lost 15#; his home scale weight is 185#. He is currently tolerating toast, eggs, grits, cream of wheat, soup. He recently ordered Boost online, which he plans to drink 1 supplement daily when it arrives.   Back pain can limit his activity but when it is controlled, he is very active on his 7 acres of land with gardens and raising chicken. He believes he walks 10,000 steps daily around his property.     Not currently taking blood thinners.   No prior abd surgery.   +family hx of CA = mother and PGM lung CA    Data:     Radiology: Dr. Darinel Marin and I personally reviewed these images:  10/5/2020: CT C/A/P:   In this patient with pancreatic adenocarcinoma, there is a 4.3 x 6.3 cm hypodensity within the pancreatic body/tail and scattered soft tissue densities about  the peritoneal surfaces of the abdomen and pelvis concerning for peritoneal implants.   1.6 cm left adrenal nodule.  Correlation with outside imaging would be helpful to determine chronicity.  In the setting of malignancy consider further evaluation with CT adrenal mass protocol versus PET-CT as clinically warranted.   Pulmonary nodules in both lungs, the largest measuring 0.6 cm in the right middle lobe.  These nodules can be followed with continued expected oncologic surveillance.   Several hypodensities along the inferior margin of the right hepatic lobe.  Attention on follow-up recommended.   Trace pelvic ascites.    10/2/2020: PET scan      9/8/2020: CT A/P Aj General:  IMPRESSION:  1. Irregular hypoenhancing masslike area in the distal pancreatic body/tail concerning highly concerning for malignancy (and less likely focal pancreatitis, hematoma or pseudocyst). Consider correlation with CA-19-9, amylase and lipase.  2. Small rounded ill-defined area in the right lobe of the liver, possibly representing an atypical hemangioma noting that metastatic disease/malignancy is not excluded. A follow-up hepatic protocol MRI or CT would be warranted.  3. Indeterminate enhancing left adrenal nodule possibly representing an adenoma noting that malignancy is not excluded.   4. Numerous small upper abdominal lymph nodes.    9/17/2020: Endoscopy:  EUS per Dr. Figueroa  A round mass was identified in the pancreatic body. The mass was hypoechoic. The mass measured 35 mm by 35 mm in maximal cross-sectional diameter. The outer margins were irregular. There was sonographic evidence suggesting invasion into the superior mesenteric artery (manifested by encasement) and the superior mesenteric vein (manifested by encasement).   Many abnormal lymph nodes were visualized in the peripancreatic region, mickie hepatis region and upper-abdominal periaortic region. The nodes were oval, hypoechoic and had well defined  margins    9/17/2020: Pathology:    PANCREATIC MASS, FINE NEEDLE ASPIRATION   - POSITIVE FOR ADENOCARCINOMA.    Labs:   CA 19-9 = 10,447  Lab Results   Component Value Date    WBC 8.0 09/29/2020    HGB 12.5 (L) 09/29/2020    HCT 37.6 (L) 09/29/2020    MCV 83.4 09/29/2020     09/29/2020       Chemistry        Component Value Date/Time     09/29/2020 1108    K 3.8 09/29/2020 1108     09/29/2020 1108    CO2 31 09/29/2020 1108    BUN 12 09/29/2020 1108    CREATININE 0.84 09/29/2020 1108     09/29/2020 1108        Component Value Date/Time    CALCIUM 9.2 09/29/2020 1108    ALKPHOS 39 07/16/2020 1133    AST 38 (H) 09/29/2020 1108    ALT 55 (H) 09/29/2020 1108    BILITOT 0.3 09/29/2020 1108    ESTGFRAFRICA 102 09/29/2020 1108    EGFRNONAA 88 09/29/2020 1108        Past Medical History:   Diagnosis Date    Brain bleed 2000    Chronically low serum potassium     growth on kidney     Diverticulosis     Hypertension     Left-sided weakness     from previous brain events    Mass of pancreas 09/2020    Neuropathy     Skin cancer     Subdural hematoma 2019    surgery    Thyroid disease      Past Surgical History:   Procedure Laterality Date    [facility]    ENDOSCOPIC ULTRASOUND OF UPPER GASTROINTESTINAL TRACT N/A 9/17/2020    Procedure: ULTRASOUND, UPPER GI TRACT, ENDOSCOPIC;  Surgeon: Massimo Figueroa III, MD;  [facility];  Service: Endoscopy;  Laterality: N/A;    PENILE PROSTHESIS IMPLANT      Right shoulder repair      UVULECTOMY      removed hyoid bone      Current Outpatient Medications on File Prior to Visit   Medication Sig Dispense Refill    allopurinoL (ZYLOPRIM) 100 MG tablet Take 1 tablet (100 mg total) by mouth once daily. 90 tablet 1    coenzyme Q10 (CO Q-10) 100 mg capsule Take 100 mg by mouth once daily.      cycloSPORINE (RESTASIS) 0.05 % ophthalmic emulsion Place 1 drop into both eyes 2  (two) times daily. 30 each 5    DULoxetine (CYMBALTA) 60 MG capsule Take 1 capsule (60 mg total) by mouth once daily. 90 capsule 1    eplerenone (INSPRA) 50 MG Tab Take 1 tablet (50 mg total) by mouth 2 (two) times daily. 180 tablet 1    ezetimibe (ZETIA) 10 mg tablet Take 1 tablet (10 mg total) by mouth once daily. 90 tablet 1    famotidine (PEPCID) 20 MG tablet Take 20 mg by mouth 2 (two) times daily.       finasteride (PROSCAR) 5 mg tablet Take 1 tablet (5 mg total) by mouth once daily. 90 tablet 1    fluticasone propionate (FLONASE) 50 mcg/actuation nasal spray 1 spray (50 mcg total) by Each Nostril route once daily. 3 Bottle 2    hydrALAZINE (APRESOLINE) 50 MG tablet Take 1 tablet (50 mg total) by mouth 2 (two) times daily. 180 tablet 1    HYDROcodone-acetaminophen (NORCO)  mg per tablet Take 1 tablet by mouth daily as needed.       levothyroxine (SYNTHROID) 25 MCG tablet Take 1 tablet (25 mcg total) by mouth once daily. 90 tablet 1    lisinopriL-hydrochlorothiazide (ZESTORETIC) 20-25 mg Tab Take 1 tablet by mouth 2 (two) times a day. 180 tablet 1    lubiprostone (AMITIZA) 24 MCG Cap Take 1 capsule (24 mcg total) by mouth 2 (two) times daily with meals. 60 capsule 3    pantoprazole (PROTONIX) 40 MG tablet Take 40 mg by mouth once daily.       potassium chloride (MICRO-K) 10 MEQ CpSR Take 1 capsule (10 mEq total) by mouth 2 (two) times daily. 180 capsule 1    sucralfate (CARAFATE) 1 gram tablet 1 g 4 (four) times daily before meals and nightly.       indomethacin (INDOCIN) 25 MG capsule Take 25 mg by mouth 2 (two) times daily as needed.       krill/om3/dha/epa/om6/lip/astx (KRILL OIL, OMEGA 3 AND 6, ORAL) Take by mouth.      ondansetron (ZOFRAN-ODT) 4 MG TbDL 4 mg every 6 (six) hours as needed.        Current Facility-Administered Medications on File Prior to Visit   Medication Dose Route Frequency Provider Last Rate Last Dose    [COMPLETED] iohexoL (OMNIPAQUE 350) injection 100 mL  100  "mL Intravenous ONCE PRN Aline Ramos, MARYA,ANP-C   100 mL at 10/05/20 1354     Review of patient's allergies indicates:   Allergen Reactions    Norvasc [amlodipine]     Penicillins     Statins-hmg-coa reductase inhibitors     Sulfa (sulfonamide antibiotics)     Sulfur Other (See Comments)     Family History:  His family history includes Cancer in his mother and paternal grandmother; Heart disease in his father.     Social History:   reports that he quit smoking about 32 years ago. He has never used smokeless tobacco. He reports previous alcohol use. He reports current drug use. Drug: Marijuana.     ROS:     Review of Systems   Constitutional: Positive for appetite change, fatigue and unexpected weight change. Negative for activity change and fever.   HENT: Negative for trouble swallowing.    Eyes: Negative for visual disturbance.        Wears glasses   Respiratory: Negative for cough, choking and shortness of breath.    Cardiovascular: Negative for chest pain and leg swelling.   Gastrointestinal: Positive for abdominal pain and constipation. Negative for diarrhea, nausea and vomiting.   Genitourinary: Negative for difficulty urinating.   Musculoskeletal: Positive for back pain (chronic, on Norco per pain mgmt).   Skin: Negative for color change.   Neurological: Negative for light-headedness, numbness and headaches.   Hematological: Does not bruise/bleed easily.   Psychiatric/Behavioral: Positive for sleep disturbance. The patient is nervous/anxious.      Pertinent positive/negatives detailed in HPI, all other systems negative.     Physical Exam:  /80 (BP Location: Right arm, Patient Position: Sitting, BP Method: Medium (Automatic))   Pulse 89   Temp 98.5 °F (36.9 °C) (Oral)   Ht 5' 8.5" (1.74 m)   Wt 87.5 kg (192 lb 12.8 oz)   BMI 28.89 kg/m²     Constitutional:  Non-toxic, no acute distress.  Performance status: ECOG 0  Eyes:  Sclerae anicteric, gaze symmetrical  Neck:  Trachea midline, " FROM  Resp:  Even and unlabored resp, CTA anterior bilaterally  CV:  Regular pulse, S1, S2, no murmurs, no rubs, no edema  Abd:  Soft, +tender, no masses, no hepatosplenomegaly, no ascites, no superficial varices  Musculoskeletal:  Ambulatory, normal gait, no muscle wasting  Neuro:  No gross deficits  Psych:  Awake, alert, oriented.  Answers and asks questions appropriately    Timed Up & Go (TUG) in seconds  Trial 1: 7.19  Trial 2: 7.02     Strength in kilograms  R: 39.3  L: 33.8      ICD-10-CM ICD-9-CM    1. Primary adenocarcinoma of body of pancreas  C25.1 157.1    2. Pancreatic mass  K86.89 577.8 Ambulatory referral/consult to Surgical Oncology   3. Malignant neoplasm of ill-defined sites within the digestive system  C26.9 159.9      Plan:  This 68 y/o gentleman presented with worsening abd pain, CT scan found mass in distal pancreas as well as soft tissue densities concerning for peritoneal implants. Underwent EUS with bx, pathology confirmed body of pancreas CA. CA 19-9 quite elevated >10,000. PET scan last week revealed metastatic disease as well as updated PPCT today. Obtain MRI liver to further characterize liver lesion - scheduled for Thursday AM.  Therefore he is not a surgical candidate. Will refer back to Dr. Alcaraz for systemic palliative chemotherapy - he has an upcoming appt later this week.     Follow up if symptoms worsen or fail to improve.     Pt seen in conjunction with Dr. Darinel Marin today.         Aline Ramos NP  Upper GI / Hepatobiliary Surgical Oncology  [facility] [place], [place]  Office: [phone]  Fax: [phone]

## 2020-10-05 NOTE — TELEPHONE ENCOUNTER
Spoke with pt, states the ER gave him medications in the hospital and he needs us to write them - Protonix, Pepcid, and Carafate. Okay per Dr Mendoza

## 2020-10-05 NOTE — PROGRESS NOTES
Patient seen with CHAN Chinchilla; history obtained, records reviewed, outside PET scan and today's CT scan personally reviewed. Unfortunately, Mr Watson appears to have peritoneal metastases. He may also have liver metastases, although the CT read today suggests that the focal liver lesion may be a cyst or hemangioma, the area was avid on PET scan.   I have reviewed the findings with Mr Watson and his wife. He is not a candidate for surgical resection. I have recommended a liver MRI to clarify the findings in re to the liver lesion.     He is to see Dr Hendricks back this Thursday.     Cc Dr Hendricks

## 2020-10-05 NOTE — PROGRESS NOTES
Subjective:      Patient ID:   Michael Watson  69 y.o. male  1950  MD Ab Cobb MD      Chief Complaint:   Pancreatic cancer    HPI:  69 y.o. male presented with dull mid abdominal pain, he now also has some back pain symptoms.  He does have history of degenerative disc disease with chronic low back pain and has had injection therapy in the past.  His abdominal pain symptoms appeared while he was hunting and was accompanied was sweating and dizziness.    He has loss of appetite and has lost approximately 40 lb over the last 6 months.  He generally weighs 225 lb and now weighs 185 lb.    He smoked 3 pack per day times 20 years, none times 30 years.  He drinks alcohol moderately.  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    He has history of a hygroma and has a sensory seizure disorder.  Other history includes idiopathic peripheral neuropathy, degenerative disc disease at the lumbar and cervical spine areas.  He is status post subdural hematoma.  He also has history of hypertension and high cholesterol and BPH.  There is a history of hyperaldosteronism and acquired hypothyroidism and he has history of gout.      He is allergic to Norvasc, penicillin, statins, and sulfa drugs.    His medications include Zyloprim, Co Q10, Restasis, Cymbalta, Inspira, Zetia, Pepcid, Proscar, Flonase, Apresoline, Norco, Indocin, Krill oil, Synthroid, Zestoretic, Amitiza, Zofran, Protonix, KCl, and Carafate.    He is status post upper endoscopy, pedro pablo hole for subdural hematoma in 2019, P nail prosthesis implant, right shoulder repair, and uvulectomy.    His dad had congestive heart failure, rheumatic fever, valvular heart disease.  His mother was a smoker and had lung cancer.  His sister had lymphoma.  Another sister is alive and well.  He has a daughter who is alive and well and a son with hypertension and  tremor.      ROS:   GEN: normal without any fever, night sweats or weight loss  HEENT: See HPI  CV: See HPI  PULM: See HPI  GI: See HPI  : See HPI  BREAST: normal with no mass, discharge, pain  SKIN: normal with no rash, erythema, bruising, or swelling     Past Medical History:   Diagnosis Date    Brain bleed 2000    Chronically low serum potassium     growth on kidney     Diverticulosis     Hypertension     Left-sided weakness     from previous brain events    Mass of pancreas 09/2020    Neuropathy     Skin cancer     Subdural hematoma 2019    surgery    Thyroid disease      Past Surgical History:   Procedure Laterality Date    CHERISE [facility]LE [facility] SUBDURAL [facility]OMA  [facility]    ENDOSCOPIC ULTRASOUND OF UPPER GASTROINTESTINAL TRACT N/A 9/17/2020    Procedure: ULTRASOUND, UPPER GI TRACT, ENDOSCOPIC;  Surgeon: Massimo Figueroa III, MD;  Location: [facility];  Service: Endoscopy;  Laterality: N/A;    PENILE PROSTHESIS IMPLANT      Right shoulder repair      UVULECTOMY      removed hyoid bone        Review of patient's allergies indicates:   Allergen Reactions    Norvasc [amlodipine]     Penicillins     Statins-hmg-coa reductase inhibitors     Sulfa (sulfonamide antibiotics)     Sulfur Other (See Comments)           Current Outpatient Medications:     allopurinoL (ZYLOPRIM) 100 MG tablet, Take 1 tablet (100 mg total) by mouth once daily., Disp: 90 tablet, Rfl: 1    coenzyme Q10 (CO Q-10) 100 mg capsule, Take 100 mg by mouth once daily., Disp: , Rfl:     cycloSPORINE (RESTASIS) 0.05 % ophthalmic emulsion, Place 1 drop into both eyes 2 (two) times daily., Disp: 30 each, Rfl: 5    DULoxetine (CYMBALTA) 60 MG capsule, Take 1 capsule (60 mg total) by mouth once daily., Disp: 90 capsule, Rfl: 1    eplerenone (INSPRA) 50 MG Tab, Take 1 tablet (50 mg total) by mouth 2 (two) times daily., Disp: 180 tablet, Rfl: 1    ezetimibe (ZETIA) 10 mg tablet, Take 1 tablet (10 mg  total) by mouth once daily., Disp: 90 tablet, Rfl: 1    famotidine (PEPCID) 20 MG tablet, Take 20 mg by mouth 2 (two) times daily. , Disp: , Rfl:     finasteride (PROSCAR) 5 mg tablet, Take 1 tablet (5 mg total) by mouth once daily., Disp: 90 tablet, Rfl: 1    fluticasone propionate (FLONASE) 50 mcg/actuation nasal spray, 1 spray (50 mcg total) by Each Nostril route once daily., Disp: 3 Bottle, Rfl: 2    hydrALAZINE (APRESOLINE) 50 MG tablet, Take 1 tablet (50 mg total) by mouth 2 (two) times daily., Disp: 180 tablet, Rfl: 1    HYDROcodone-acetaminophen (NORCO)  mg per tablet, Take 1 tablet by mouth daily as needed. , Disp: , Rfl:     indomethacin (INDOCIN) 25 MG capsule, Take 25 mg by mouth 2 (two) times daily as needed. , Disp: , Rfl:     krill/om3/dha/epa/om6/lip/astx (KRILL OIL, OMEGA 3 AND 6, ORAL), Take by mouth., Disp: , Rfl:     levothyroxine (SYNTHROID) 25 MCG tablet, Take 1 tablet (25 mcg total) by mouth once daily., Disp: 90 tablet, Rfl: 1    lisinopriL-hydrochlorothiazide (ZESTORETIC) 20-25 mg Tab, Take 1 tablet by mouth 2 (two) times a day., Disp: 180 tablet, Rfl: 1    lubiprostone (AMITIZA) 24 MCG Cap, Take 1 capsule (24 mcg total) by mouth 2 (two) times daily with meals., Disp: 60 capsule, Rfl: 3    ondansetron (ZOFRAN-ODT) 4 MG TbDL, 4 mg every 6 (six) hours as needed. , Disp: , Rfl:     pantoprazole (PROTONIX) 40 MG tablet, Take 40 mg by mouth once daily. , Disp: , Rfl:     potassium chloride (MICRO-K) 10 MEQ CpSR, Take 1 capsule (10 mEq total) by mouth 2 (two) times daily., Disp: 180 capsule, Rfl: 1    sucralfate (CARAFATE) 1 gram tablet, 1 g 4 (four) times daily before meals and nightly. , Disp: , Rfl:           Objective:   Vitals:  Blood pressure 124/78, pulse 98, temperature 97.3 °F (36.3 °C), resp. rate 18, weight 86.8 kg (191 lb 4.8 oz).    Physical Examination:   GEN: no apparent distress, comfortable  HEAD: atraumatic and normocephalic  EYES: no pallor, no icterus  ENT:   no pharyngeal erythema, external ears WNL; no nasal discharge  NECK: no masses, thyroid normal, trachea midline, no LAD/LN's, supple  CV: RRR with no murmur; normal pulse; normal S1 and S2; no pedal edema  CHEST: Normal respiratory effort; CTAB; distant  breath sounds; no wheeze or crackles  ABDOM: nontender and nondistended; soft; L/S NP, no rebound/guarding  MUSC/Skeletal: ROM normal; no crepitus; joints normal  EXTREM: no clubbing, cyanosis, inflammation or swelling  SKIN: no rashes, lesions, ulcers, petechiae   : no cvat  NEURO: grossly intact; motor/sensory WNL; ; no tremors  PSYCH: normal mood, affect and behavior  LYMPH: normal cervical, supraclavicular, axillary and groin LN's  BREASTS: L & R with out palpable mass    Labs:   Lab Results   Component Value Date    WBC 8.0 09/29/2020    HGB 12.5 (L) 09/29/2020    HCT 37.6 (L) 09/29/2020    MCV 83.4 09/29/2020     09/29/2020    CMP  Sodium   Date Value Ref Range Status   09/29/2020 141 135 - 146 mmol/L Final     Potassium   Date Value Ref Range Status   09/29/2020 3.8 3.5 - 5.3 mmol/L Final     Chloride   Date Value Ref Range Status   09/29/2020 101 98 - 110 mmol/L Final     CO2   Date Value Ref Range Status   09/29/2020 31 20 - 32 mmol/L Final     Glucose   Date Value Ref Range Status   09/29/2020 127 65 - 139 mg/dL Final     Comment:               Non-fasting reference interval          BUN, Bld   Date Value Ref Range Status   09/29/2020 12 7 - 25 mg/dL Final     Creatinine   Date Value Ref Range Status   09/29/2020 0.84 0.70 - 1.18 mg/dL Final     Comment:     For patients >49 years of age, the reference limit  for Creatinine is approximately 13% higher for people  identified as -American.          Calcium   Date Value Ref Range Status   09/29/2020 9.2 8.6 - 10.3 mg/dL Final     Total Protein   Date Value Ref Range Status   09/29/2020 6.8 6.1 - 8.1 g/dL Final     Albumin   Date Value Ref Range Status   09/29/2020 3.7 3.6 - 5.1 g/dL Final      Total Bilirubin   Date Value Ref Range Status   09/29/2020 0.3 0.2 - 1.2 mg/dL Final     Alkaline Phosphatase   Date Value Ref Range Status   07/16/2020 39 35 - 144 U/L Final     AST   Date Value Ref Range Status   09/29/2020 38 (H) 10 - 35 U/L Final     ALT   Date Value Ref Range Status   09/29/2020 55 (H) 9 - 46 U/L Final     eGFR if    Date Value Ref Range Status   09/29/2020 102 > OR = 60 mL/min/1.73m2 Final     eGFR if non    Date Value Ref Range Status   09/29/2020 88 > OR = 60 mL/min/1.73m2 Final     CBC, CMP,  Ca 19-9 have been ordered.    Pathology report from biopsy from September 17, 2020 returned adenocarcinoma of the pancreas.    CT scan was [facility] at [facility].  It showed a 5.3 cm mass in the tail of the pancreas.  This mass encased the splenic artery and vein, it also abutted the stomach, duodenum, colon and there was several small lymph nodes in the area.  There was also a nodule in the liver and adrenal gland of unclear significance.    I have ordered a PET scan for staging.    Assessment:   (1) 69 y.o. male has locally advanced mass effect at the tail of the pancreas, biopsy is positive for adenocarcinoma of the pancreas.  I have asked him to see Dr. Marin the surgeon to see if he would be a candidate for surgery here, possibly after the administration of chemotherapy and/ or radiation therapy.    (2) I have asked him to see  to get his opinion on neoadjuvant radiation or definitive radiation for local control.    PET scan for staging is pending.  CA 19 9, CBC, CMP is pending.    Definitive chemotherapy or neoadjuvant chemotherapy could include FOLFIRINOX q.2 weeks times 4-6 cycles.    Combined therapy with radiation and infusional 5 FU Monday through Friday times 4-6 weeks is also considered.  The radiation would either be neoadjuvant or definitive depending on his surgery evaluation.    He would need mickie catheter placement or Medline  placement to facilitate chemotherapy administration.  He does have peripheral neuropathy symptoms in the feet which may be aggravated by the administration of chemotherapy here.  He will follow up with myself on October 7th or 8th after the PET scan is completed and after he has seen Radiation MD and the surgeon.    Other physicians involved in his care include Peter Mendoza MD, his primary care physician, Young Figueroa MD his GI physician of record, Guicho Schroeder MD, his pulmonologist and Dr. Shafer, his nephrologist.      I have spent approximately 1 hour and 20 minutes in this initial consultation.

## 2020-10-05 NOTE — LETTER
October 7, 2020      JOON Hendricks MD  [address]  [address]  [facility] [facility] [place] [address]           [facility]  [address]  [place] [address]  Phone: [phone]          Patient: Michael Watson   MR Number: [id number]   YOB: 1950   Date of Visit: 10/5/2020       Dear Dr. JOON Hendricks:    Thank you for referring Michael Watson to me for evaluation. Attached you will find relevant portions of my assessment and plan of care.    If you have questions, please do not hesitate to call me. I look forward to following Michael Watson along with you.    Sincerely,    MARYA Salomon,ANP-C    Enclosure  CC:  No Recipients    If you would like to receive this communication electronically, please contact [email or web address] or [phone] to request more information on [employer] access.    For providers and/or their staff who would like to refer a patient to [facility], please contact us through our one-stop-shop provider referral line, [facility], at [phone].    If you feel you have received this communication in error or would no longer like to receive these types of communications, please e-mail [email or web address]

## 2020-10-05 NOTE — TELEPHONE ENCOUNTER
----- Message from Marcia Flowers sent at 10/5/2020  2:56 PM CDT -----  VM   The patients wife  lmor saying the patient needs some prescriptions he got when he was in the hospital # [phone] GH

## 2020-10-07 NOTE — PROGRESS NOTES
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  Numerous small upper abdominal lymph nodes were appreciated.    He underwent endoscopic ultrasound with Dr. Figueroa noting a rounded mass in the pancreatic body, 3.5 x 3.5 cm in size with irregular margins and invasion into SMA, SMV.  Abnormal lymphadenopathy was appreciated in the peripancreatic, mickie hepatic and upper abdominal periaortic region.  FNA of the pancreatic mass returned adenocarcinoma.  CT of the chest abdomen pelvis noted a 4.3 x 6.3 cm hypodensity within the pancreatic body/tail and scattered soft tissue densities about the peritoneal surfaces of the abdomen and pelvis concerning for peritoneal implants; also appreciated were bilateral pulmonary nodules, hypodensities at the inferior margin of the right hepatic lobe, trace pelvic ascites an indeterminate 1.6 cm left adrenal nodule.    Ca 19-9 10,447    PET-CT confirmed FDG avid pancreatic mass with mickie hepatic and peripancreatic lymphadenopathy, right lobe of the liver metastasis, indeterminate left adrenal nodule and likely metastatic implant at the right kidney.    Patient was seen by Dr. Marin who noted peritoneal metastases and deemed the patient inoperable. Patient was previously seen by Dr. Hendricks and today presents for radiotherapy discussion.    Today patient denies fever, chills, chest pain, shortness of breath,  cough or hemoptysis.  He reports 10-15 lb weight loss.  He denies dark urine, pale stools, yellow skin.  He has vague lower abdominal discomfort, chronic constipation due to chronic opioid use for low back pain.  He reports midback pain causing discomfort and inhibiting quality of life.    Review of systems otherwise negative unless indicated in HPI.    Past Medical History:   Diagnosis Date    Brain bleed     Chronically low serum potassium     growth on kidney     Diverticulosis     Hypertension     Left-sided weakness     from previous brain events    Neuropathy     Primary adenocarcinoma of pancreas 2020    Skin cancer     Subdural hematoma     surgery    Thyroid disease      Past Surgical History:   Procedure Laterality Date    CHERISE HOLE FOR SUBDURAL HEMATOMA      [facility].Ms.    ENDOSCOPIC ULTRASOUND OF UPPER GASTROINTESTINAL TRACT N/A 2020    Procedure: ULTRASOUND, UPPER GI TRACT, ENDOSCOPIC;  Surgeon: Massimo Figueroa III, MD;  Location: [facility];  Service: Endoscopy;  Laterality: N/A;    PENILE PROSTHESIS IMPLANT      Right shoulder repair      UVULECTOMY      removed hyoid bone      Social History     Socioeconomic History    Marital status:      Spouse name: Not on file    Number of children: Not on file    Years of education: Not on file    Highest education level: Not on file   Occupational History    Not on file   Social Needs    Financial resource strain: Not on file    Food insecurity     Worry: Not on file     Inability: Not on file    Transportation needs     Medical: Not on file     Non-medical: Not on file   Tobacco Use    Smoking status: Former Smoker     Quit date:      Years since quittin.7    Smokeless tobacco: Never Used   Substance and Sexual Activity    Alcohol use: Not Currently    Drug use: Yes     Types: Marijuana    Sexual activity: Not on file   Lifestyle    Physical activity     Days per  week: Not on file     Minutes per session: Not on file    Stress: Not on file   Relationships    Social connections     Talks on phone: Not on file     Gets together: Not on file     Attends Muslim service: Not on file     Active member of club or organization: Not on file     Attends meetings of clubs or organizations: Not on file     Relationship status: Not on file   Other Topics Concern    Not on file   Social History Narrative    Not on file     Family History   Problem Relation Age of Onset    Cancer Mother         lung CA    Heart disease Father     Cancer Paternal Grandmother         lung CA       PRIOR HISTORY OF CHEMOTHERAPY OR RADIOTHERAPY: Please see HPI for patients prior oncologic history.    Medication List with Changes/Refills   Current Medications    ALLOPURINOL (ZYLOPRIM) 100 MG TABLET    Take 1 tablet (100 mg total) by mouth once daily.    COENZYME Q10 (CO Q-10) 100 MG CAPSULE    Take 100 mg by mouth once daily.    CYCLOSPORINE (RESTASIS) 0.05 % OPHTHALMIC EMULSION    Place 1 drop into both eyes 2 (two) times daily.    DULOXETINE (CYMBALTA) 60 MG CAPSULE    Take 1 capsule (60 mg total) by mouth once daily.    EPLERENONE (INSPRA) 50 MG TAB    Take 1 tablet (50 mg total) by mouth 2 (two) times daily.    EZETIMIBE (ZETIA) 10 MG TABLET    Take 1 tablet (10 mg total) by mouth once daily.    FAMOTIDINE (PEPCID) 20 MG TABLET    Take 1 tablet (20 mg total) by mouth 2 (two) times daily.    FINASTERIDE (PROSCAR) 5 MG TABLET    Take 1 tablet (5 mg total) by mouth once daily.    FLUTICASONE PROPIONATE (FLONASE) 50 MCG/ACTUATION NASAL SPRAY    1 spray (50 mcg total) by Each Nostril route once daily.    HYDRALAZINE (APRESOLINE) 50 MG TABLET    Take 1 tablet (50 mg total) by mouth 2 (two) times daily.    HYDROCODONE-ACETAMINOPHEN (NORCO)  MG PER TABLET    Take 1 tablet by mouth daily as needed.     INDOMETHACIN (INDOCIN) 25 MG CAPSULE    Take 25 mg by mouth 2 (two) times daily as needed.      KRILL/OM3/DHA/EPA/OM6/LIP/ASTX (KRILL OIL, OMEGA 3 AND 6, ORAL)    Take by mouth.    LEVOTHYROXINE (SYNTHROID) 25 MCG TABLET    Take 1 tablet (25 mcg total) by mouth once daily.    LISINOPRIL-HYDROCHLOROTHIAZIDE (ZESTORETIC) 20-25 MG TAB    Take 1 tablet by mouth 2 (two) times a day.    LUBIPROSTONE (AMITIZA) 24 MCG CAP    Take 1 capsule (24 mcg total) by mouth 2 (two) times daily with meals.    ONDANSETRON (ZOFRAN-ODT) 4 MG TBDL    4 mg every 6 (six) hours as needed.     PANTOPRAZOLE (PROTONIX) 40 MG TABLET    Take 1 tablet (40 mg total) by mouth once daily.    POTASSIUM CHLORIDE (MICRO-K) 10 MEQ CPSR    Take 1 capsule (10 mEq total) by mouth 2 (two) times daily.    SUCRALFATE (CARAFATE) 1 GRAM TABLET    Take 1 tablet (1 g total) by mouth 4 (four) times daily before meals and nightly.     Review of patient's allergies indicates:   Allergen Reactions    Norvasc [amlodipine]     Penicillins     Statins-hmg-coa reductase inhibitors     Sulfa (sulfonamide antibiotics)     Sulfur Other (See Comments)       QUALITY OF LIFE: 90%- Able to Carry on Normal Activity: Minor Symptoms of Disease    Vitals:    10/07/20 0812   BP: 133/76   Pulse: 99   Resp: 18   Temp: 97.9 °F (36.6 °C)   SpO2: 95%   Weight: 87.1 kg (192 lb)   PainSc:   4     Body mass index is 28.77 kg/m².    PHYSICAL EXAM:   GENERAL: alert; in no apparent distress.  Not ill-appearing  HEAD: normocephalic, atraumatic.  EYES: pupils are equal, round, reactive to light and accommodation. Sclera anicteric. Conjunctiva not injected.   NECK: no cervical motion rigidity; supple with no masses.  CHEST: Patient is speaking comfortably on room air with normal work of breathing without using accessory muscles of respiration.  ABDOMEN: soft, nontender, nondistended.   MUSCULOSKELETAL: no tenderness to palpation along the spine or scapulae. Normal range of motion.  NEUROLOGIC: cranial nerves II-XII intact bilaterally. Strength 5/5 in bilateral upper and lower  extremities. No sensory deficits appreciated.  Normal gait.  EXTREMITIES: no clubbing, cyanosis, edema.  SKIN: no erythema, rashes, ulcerations noted.  No jaundice    REVIEW OF IMAGING/PATHOLOGY/LABS: Please see HPI. All images reviewed personally by dictating physician.     ASSESSMENT: 69 y.o. 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  PLAN:  iMchael Watson presents with biopsy-proven adenocarcinoma of the pancreatic body with CT chest abdomen pelvis and PET-CT concerning for metastatic picture with potential liver involvement as well as peritoneal implants.  I agree this is an inoperable circumstance.  Highly elevated CA 19 9 also consistent with metastatic picture.  MRI of the abdomen is pending for further clarification.     At today's exam there are no signs of obstructive jaundice and the patient is having pain which may be due to the primary site of disease.  We discussed the role of radiotherapy as palliative in this circumstance with the consequence of delaying systemic therapy.  My recommendation is to undergo celiac neurolysis for pain relief with immediate transition to full-dose systemic therapy pending candidacy per Dr. Hendricks. We can reserve radiotherapy for symptom palliation at a later date or for consolidation of primary site/liver if he demonstrates a good response to systemic therapy.  If celiac neurolysis does not relieve pain we can revisit the option of radiotherapy.  Similarly if patient poorly tolerates systemic therapy we could revisit radiotherapy with the benefit of local control of the primary site.     The patient and his wife expressed understanding and offered several questions which I did my best to address.  I will carbon copy our dietitian for consideration of Creon supplementation, have spoken with Dr. Figueroa about celiac neurolysis and patient will follow-up with Dr. Hendricks after completion of MRI of the abdomen.    The patient has our  contact information and understands that they are free to contact us at any time with questions or concerns regarding radiation therapy.    DISPOSITION: RTC PRN    I have personally seen and evaluated this patient. Greater than 50% of this time was spent discussing coordination of care and/or counseling.    COVID-19 precautions discussed. Cancer Center policy for COVID-19 testing described.  Patient will be required to wear a mask when in the Cancer Center.    PHYSICIAN: Massimo Lewis Jr, MD    Thank you for the opportunity to meet and consult with Michael Wtason.   Please feel free to contact me to discuss the above recommendation further.

## 2020-10-07 NOTE — PROGRESS NOTES
Mr. Michael Watson is a 69 year old male diagnosed with pancreatic cancer.  He is here today with his wife for a radiation consultation.  I completed the NCCN Distress Screening; he indicated a rating of 3.  Patient had some dietary concerns; Renu Crenshaw RD, met with patient to introduce herself and will meet with the couple in the future for additional dietary supports.  The couple stated that they currently do not have any needs for social work interventions.  I provided them with my contact information in the event they need assistance in the future.

## 2020-10-08 NOTE — PROGRESS NOTES
Medical Nutrition Therapy Oncology Progress Note      Patient's PCP:Peter Mendoza MD  Referring Provider: No ref. provider found    Recommendations/Interventions     RD Notes  RD met with pt & spouse on 10/7 prior to his rad onc consult. Pt has dx of stage IV pancreatic cancer. Per pt chart, pt is not a surgical candidate- treatment plan will likely include chemotherapy alone at this time. Today pt endorses recent unintentional wt loss of ~15# due to abdominal cramping after eating. He reports that certain foods cause increased abdominal pain compared to others, but he has not tried to eliminate known dietary triggers. He is also constipated r/t chronic pain med use for his back pain. He is taking Amitiza for constipation. CW: 191#.     Plan  1. Recommend pt consume smaller, more frequent meals via lower-fat diet to assist with abdominal pain.   2. Encouraged pt to keep a food log with s/s of intolerances for each meal/snack.   3. Creon enzymes per Dr. Hendricks? Recommend Creon 36,000 units- 2 with meals/1 with snacks.   4. Continue Amitiza for constipation. Encouraged increased fiber + fluid intake as well.   5. Provided RD contact info. Encouraged pt & spouse to call with any questions/concerns.     Subjective:        Patient ID: Michael Watson is a 69 y.o. male.    Chief Complaint: unintentional wt loss- pancreatic cancer    Past Medical History:   Diagnosis Date    Brain bleed 2000    Chronically low serum potassium     growth on kidney     Diverticulosis     Hypertension     Left-sided weakness     from previous brain events    Neuropathy     Primary adenocarcinoma of pancreas 09/2020    Skin cancer     Subdural hematoma 2019    surgery    Thyroid disease        Past Surgical History:   Procedure Laterality Date    CHERISE HOLE FOR SUBDURAL HEMATOMA  2019    [facility].Ms.    ENDOSCOPIC ULTRASOUND OF UPPER GASTROINTESTINAL TRACT N/A  2020    Procedure: ULTRASOUND, UPPER GI TRACT, ENDOSCOPIC;  Surgeon: Massimo Figueroa III, MD;  Location: [facility];  Service: Endoscopy;  Laterality: N/A;    PENILE PROSTHESIS IMPLANT      Right shoulder repair      UVULECTOMY      removed hyoid bone        Social History     Socioeconomic History    Marital status:      Spouse name: Not on file    Number of children: Not on file    Years of education: Not on file    Highest education level: Not on file   Occupational History    Not on file   Social Needs    Financial resource strain: Not on file    Food insecurity     Worry: Not on file     Inability: Not on file    Transportation needs     Medical: Not on file     Non-medical: Not on file   Tobacco Use    Smoking status: Former Smoker     Quit date:      Years since quittin.7    Smokeless tobacco: Never Used   Substance and Sexual Activity    Alcohol use: Not Currently    Drug use: Yes     Types: Marijuana    Sexual activity: Not on file   Lifestyle    Physical activity     Days per week: Not on file     Minutes per session: Not on file    Stress: Not on file   Relationships    Social connections     Talks on phone: Not on file     Gets together: Not on file     Attends Bahai service: Not on file     Active member of club or organization: Not on file     Attends meetings of clubs or organizations: Not on file     Relationship status: Not on file   Other Topics Concern    Not on file   Social History Narrative    Not on file       Family History   Problem Relation Age of Onset    Cancer Mother         lung CA    Heart disease Father     Cancer Paternal Grandmother         lung CA       Review of patient's allergies indicates:   Allergen Reactions    Norvasc [amlodipine]     Penicillins     Statins-hmg-coa reductase inhibitors     Sulfa (sulfonamide antibiotics)     Sulfur Other (See Comments)       Current Outpatient Medications:     allopurinoL (ZYLOPRIM)  100 MG tablet, Take 1 tablet (100 mg total) by mouth once daily., Disp: 90 tablet, Rfl: 1    coenzyme Q10 (CO Q-10) 100 mg capsule, Take 100 mg by mouth once daily., Disp: , Rfl:     cycloSPORINE (RESTASIS) 0.05 % ophthalmic emulsion, Place 1 drop into both eyes 2 (two) times daily., Disp: 30 each, Rfl: 5    DULoxetine (CYMBALTA) 60 MG capsule, Take 1 capsule (60 mg total) by mouth once daily., Disp: 90 capsule, Rfl: 1    eplerenone (INSPRA) 50 MG Tab, Take 1 tablet (50 mg total) by mouth 2 (two) times daily., Disp: 180 tablet, Rfl: 1    ezetimibe (ZETIA) 10 mg tablet, Take 1 tablet (10 mg total) by mouth once daily., Disp: 90 tablet, Rfl: 1    famotidine (PEPCID) 20 MG tablet, Take 1 tablet (20 mg total) by mouth 2 (two) times daily., Disp: 180 tablet, Rfl: 1    finasteride (PROSCAR) 5 mg tablet, Take 1 tablet (5 mg total) by mouth once daily., Disp: 90 tablet, Rfl: 1    fluticasone propionate (FLONASE) 50 mcg/actuation nasal spray, 1 spray (50 mcg total) by Each Nostril route once daily., Disp: 3 Bottle, Rfl: 2    hydrALAZINE (APRESOLINE) 50 MG tablet, Take 1 tablet (50 mg total) by mouth 2 (two) times daily., Disp: 180 tablet, Rfl: 1    HYDROcodone-acetaminophen (NORCO)  mg per tablet, Take 1 tablet by mouth daily as needed. , Disp: , Rfl:     indomethacin (INDOCIN) 25 MG capsule, Take 25 mg by mouth 2 (two) times daily as needed. , Disp: , Rfl:     krill/om3/dha/epa/om6/lip/astx (KRILL OIL, OMEGA 3 AND 6, ORAL), Take by mouth., Disp: , Rfl:     levothyroxine (SYNTHROID) 25 MCG tablet, Take 1 tablet (25 mcg total) by mouth once daily., Disp: 90 tablet, Rfl: 1    lisinopriL-hydrochlorothiazide (ZESTORETIC) 20-25 mg Tab, Take 1 tablet by mouth 2 (two) times a day., Disp: 180 tablet, Rfl: 1    lubiprostone (AMITIZA) 24 MCG Cap, Take 1 capsule (24 mcg total) by mouth 2 (two) times daily with meals., Disp: 60 capsule, Rfl: 3    ondansetron (ZOFRAN-ODT) 4 MG TbDL, 4 mg every 6 (six) hours as  needed. , Disp: , Rfl:     pantoprazole (PROTONIX) 40 MG tablet, Take 1 tablet (40 mg total) by mouth once daily., Disp: 90 tablet, Rfl: 1    potassium chloride (MICRO-K) 10 MEQ CpSR, Take 1 capsule (10 mEq total) by mouth 2 (two) times daily., Disp: 180 capsule, Rfl: 1    sucralfate (CARAFATE) 1 gram tablet, Take 1 tablet (1 g total) by mouth 4 (four) times daily before meals and nightly., Disp: 120 tablet, Rfl: 5    All medications and past history have been reviewed.    [No treatment plan]    Objective:        Wt Readings from Last 1 Encounters:   10/07/20 0812 87.1 kg (192 lb)       Last Labs:  Glucose   Date Value Ref Range Status   09/29/2020 127 65 - 139 mg/dL Final     Comment:               Non-fasting reference interval        07/16/2020 134 (H) 65 - 99 mg/dL Final     Comment:                   Fasting reference interval     For someone without known diabetes, a glucose  value >125 mg/dL indicates that they may have  diabetes and this should be confirmed with a  follow-up test.          BUN, Bld   Date Value Ref Range Status   09/29/2020 12 7 - 25 mg/dL Final   07/16/2020 20 7 - 25 mg/dL Final     Creatinine   Date Value Ref Range Status   09/29/2020 0.84 0.70 - 1.18 mg/dL Final     Comment:     For patients >49 years of age, the reference limit  for Creatinine is approximately 13% higher for people  identified as -American.        07/16/2020 0.84 0.70 - 1.18 mg/dL Final     Comment:     For patients >49 years of age, the reference limit  for Creatinine is approximately 13% higher for people  identified as -American.          Sodium   Date Value Ref Range Status   09/29/2020 141 135 - 146 mmol/L Final   07/16/2020 138 135 - 146 mmol/L Final     Potassium   Date Value Ref Range Status   09/29/2020 3.8 3.5 - 5.3 mmol/L Final   07/16/2020 3.9 3.5 - 5.3 mmol/L Final     No results found for: PHOS  Calcium   Date Value Ref Range Status   09/29/2020 9.2 8.6 - 10.3 mg/dL Final   07/16/2020 9.6  8.6 - 10.3 mg/dL Final     No results found for: PREALBUMIN  Total Protein   Date Value Ref Range Status   09/29/2020 6.8 6.1 - 8.1 g/dL Final   07/16/2020 7.2 6.1 - 8.1 g/dL Final     Cholesterol   Date Value Ref Range Status   07/16/2020 190 <200 mg/dL Final     No results found for: HGBA1C  Hemoglobin   Date Value Ref Range Status   09/29/2020 12.5 (L) 13.2 - 17.1 g/dL Final   07/16/2020 14.5 13.2 - 17.1 g/dL Final     Hematocrit   Date Value Ref Range Status   09/29/2020 37.6 (L) 38.5 - 50.0 % Final   07/16/2020 45.8 38.5 - 50.0 % Final     No results found for: IRON  No components found for: FROLATE  No results found for: [id number]  WBC   Date Value Ref Range Status   09/29/2020 8.0 3.8 - 10.8 Thousand/uL Final   07/16/2020 7.8 3.8 - 10.8 Thousand/uL Final       Assessment:     Nutrition/Diet History     Patient Reported Diet/Restrictions/Preferences: regular diet- small portions  Food Allergies: NKFA  Factors Affecting Nutritional Intake: abdominal cramping; constipation    Estimated/Assessed Needs     Weight Used For Calorie Calculations: 86.8 kg (191 lb)  Energy Calorie Requirements (kcal): 3504-7920 kcal/day   Energy Need Method: 25-30 Kcal/kg  Protein Requirements: 104-130 g/day   Protein Need Method: 1.2-1.5 g/kg  Fluid Requirements: 2200 ml/day  Estimated Fluid Requirement Method: 1ml/kcal      Nutrition Support  N/A    Evaluation of Received Nutrient/Fluid Intake     Calorie Intake: not meeting needs  Protein Intake: not meeting needs  Fluid Intake: meeting needs  Tolerance: tolerating  % Intake of Estimated Energy Needs: 50-75 %      Nutrition Diagnosis Related to (Etiology) As Evidenced By (Signs/Symptoms)   Inadequate protein-energy intake Abdominal cramping & constipation 2' pancreatic cancer Recent unintentional wt loss of ~15#       RD Notes  RD met with pt & spouse on 10/7 prior to his rad onc consult. Pt has dx of stage IV pancreatic cancer. Per pt chart, pt is not a surgical candidate-  treatment plan will likely include chemotherapy alone at this time. Today pt endorses recent unintentional wt loss of ~15# due to abdominal cramping after eating. He reports that certain foods cause increased abdominal pain compared to others, but he has not tried to eliminate known dietary triggers. He is also constipated r/t chronic pain med use for his back pain. He is taking Amitiza for constipation. CW: 191#.     Nutrition Intervention:      Nutrition Intervention Diets modified for specific foods or ingedients   Goals/Expected Outcomes Keep food log to distinguish triggers for abdominal pain.    Progress Initial       Plan  1. Recommend pt consume smaller, more frequent meals via lower-fat diet to assist with abdominal pain.   2. Encouraged pt to keep a food log with s/s of intolerances for each meal/snack.   3. Creon enzymes per Dr. Hendricks? Recommend Creon 36,000 units- 2 with meals/1 with snacks.   4. Continue Amitiza for constipation. Encouraged increased fiber + fluid intake as well.   5. Provided RD contact info. Encouraged pt & spouse to call with any questions/concerns.     Monitoring/Evaluation:     Monitor: wt, p.o. intake    Next Visit: Will f/u prn.       I have explained and the patient understands all of  the current recommendation(s). I have answered all of their questions to the best of my ability and to their complete satisfaction.   The patient is to continue with the current management plan.    Electronically signed by: Renu Crenshaw MS, RDN, LDN

## 2020-10-08 NOTE — PROGRESS NOTES
[facility] Hematology Oncology Consultation Note    10/8/20       Subjective:      Patient ID:   Michael Watson  69 y.o. male  1950  MD Ab Cobb MD      Chief Complaint:   Pancreatic cancer    HPI:  69 y.o. male presented with dull mid abdominal pain, he now also has some back pain symptoms.  He does have history of degenerative disc disease with chronic low back pain and has had injection therapy in the past.  His abdominal pain symptoms appeared while he was hunting and was accompanied was sweating and dizziness.    He has loss of appetite and has lost approximately 40 lb over the last 6 months.  He generally weighs 225 lb and now weighs 185 lb.    He smoked 3 pack per day times 20 years, none times 30 years.  He drinks alcohol moderately.  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    He has history of a hygroma and has a sensory seizure disorder.  Other history includes idiopathic peripheral neuropathy, degenerative disc disease at the lumbar and cervical spine areas.  He is status post subdural hematoma.  He also has history of hypertension and high cholesterol and BPH.  There is a history of hyperaldosteronism and acquired hypothyroidism and he has history of gout.      He is allergic to Norvasc, penicillin, statins, and sulfa drugs.    His medications include Zyloprim, Co Q10, Restasis, Cymbalta, Inspira, Zetia, Pepcid, Proscar, Flonase, Apresoline, Norco, Indocin, Krill oil, Synthroid, Zestoretic, Amitiza, Zofran, Protonix, KCl, and Carafate.    He is status post upper endoscopy, pedro pablo hole for subdural hematoma in 2019, P nail prosthesis implant, right shoulder repair, and uvulectomy.    His dad had congestive heart failure, rheumatic fever, valvular heart disease.  His mother was a smoker and had lung cancer.  His sister had lymphoma.  Another sister is alive and well.  He has a  daughter who is alive and well and a son with hypertension and tremor.      ROS:   GEN: normal without any fever, night sweats or weight loss  HEENT: See HPI  CV: See HPI  PULM: See HPI  GI: See HPI  : See HPI  BREAST: normal with no mass, discharge, pain  SKIN: normal with no rash, erythema, bruising, or swelling     Past Medical History:   Diagnosis Date    Abdominal pain     radiates to mid back - stage 4 pancreatic cancer    Brain bleed 2000    Chronically low serum potassium     growth on kidney     Diverticulosis     Hypertension     Left-sided weakness     from previous brain events    Neuropathy     bilateral up to knees    Primary adenocarcinoma of pancreas 09/2020    Skin cancer     Subdural hematoma 2019    surgery    Thyroid disease      Past Surgical History:   Procedure Laterality Date    CHERISE HOLE FOR SUBDURAL HEMATOMA  2019    [facility].Ms.    CARPAL TUNNEL RELEASE Bilateral     ENDOSCOPIC ULTRASOUND OF UPPER GASTROINTESTINAL TRACT N/A 9/17/2020    Procedure: ULTRASOUND, UPPER GI TRACT, ENDOSCOPIC;  Surgeon: Massimo Figueroa III, MD;  Location: [facility] ENDO;  Service: Endoscopy;  Laterality: N/A;    ENDOSCOPIC ULTRASOUND OF UPPER GASTROINTESTINAL TRACT N/A 10/12/2020    Procedure: ULTRASOUND, UPPER GI TRACT, ENDOSCOPIC;  Surgeon: Massimo Figueroa III, MD;  Location: [facility] ENDO;  Service: Endoscopy;  Laterality: N/A;    PENILE PROSTHESIS IMPLANT      Right shoulder repair      UVULECTOMY      removed hyoid bone        Review of patient's allergies indicates:   Allergen Reactions    Norvasc [amlodipine]     Penicillins     Statins-hmg-coa reductase inhibitors     Sulfa (sulfonamide antibiotics)     Sulfur Other (See Comments)           Current Outpatient Medications:     allopurinoL (ZYLOPRIM) 100 MG tablet, Take 1 tablet (100 mg total) by mouth once daily., Disp: 90 tablet, Rfl: 1    cetirizine (ZYRTEC) 10 MG tablet, Take 10 mg by mouth once daily.,  Disp: , Rfl:     coenzyme Q10 (CO Q-10) 100 mg capsule, Take 100 mg by mouth once daily., Disp: , Rfl:     cycloSPORINE (RESTASIS) 0.05 % ophthalmic emulsion, Place 1 drop into both eyes 2 (two) times daily., Disp: 30 each, Rfl: 5    DULoxetine (CYMBALTA) 60 MG capsule, Take 1 capsule (60 mg total) by mouth once daily. (Patient taking differently: Take 60 mg by mouth 2 (two) times daily. ), Disp: 90 capsule, Rfl: 1    eplerenone (INSPRA) 50 MG Tab, Take 1 tablet (50 mg total) by mouth 2 (two) times daily., Disp: 180 tablet, Rfl: 1    ezetimibe (ZETIA) 10 mg tablet, Take 1 tablet (10 mg total) by mouth once daily., Disp: 90 tablet, Rfl: 1    famotidine (PEPCID) 20 MG tablet, Take 1 tablet (20 mg total) by mouth 2 (two) times daily., Disp: 180 tablet, Rfl: 1    fentaNYL (DURAGESIC) 25 mcg/hr, Place 1 patch onto the skin every 72 hours., Disp: 10 patch, Rfl: 0    finasteride (PROSCAR) 5 mg tablet, Take 1 tablet (5 mg total) by mouth once daily., Disp: 90 tablet, Rfl: 1    fluticasone propionate (FLONASE) 50 mcg/actuation nasal spray, 1 spray (50 mcg total) by Each Nostril route once daily. (Patient taking differently: 1 spray by Each Nostril route 2 (two) times a day. ), Disp: 3 Bottle, Rfl: 2    hydrALAZINE (APRESOLINE) 50 MG tablet, Take 1 tablet (50 mg total) by mouth 2 (two) times daily., Disp: 180 tablet, Rfl: 1    HYDROcodone-acetaminophen (NORCO)  mg per tablet, Take 1 tablet by mouth every 6 (six) hours as needed for Pain., Disp: 120 tablet, Rfl: 0    indomethacin (INDOCIN) 25 MG capsule, Take 25 mg by mouth 2 (two) times daily as needed. , Disp: , Rfl:     krill/om3/dha/epa/om6/lip/astx (KRILL OIL, OMEGA 3 AND 6, ORAL), Take by mouth., Disp: , Rfl:     levothyroxine (SYNTHROID) 25 MCG tablet, Take 1 tablet (25 mcg total) by mouth once daily., Disp: 90 tablet, Rfl: 1    lisinopriL-hydrochlorothiazide (ZESTORETIC) 20-25 mg Tab, Take 1 tablet by mouth 2 (two) times a day., Disp: 180 tablet,  Rfl: 1    lubiprostone (AMITIZA) 24 MCG Cap, Take 1 capsule (24 mcg total) by mouth 2 (two) times daily with meals., Disp: 60 capsule, Rfl: 3    ondansetron (ZOFRAN-ODT) 4 MG TbDL, 4 mg every 6 (six) hours as needed. , Disp: , Rfl:     oxyCODONE (ROXICODONE) 5 MG immediate release tablet, Take 1-2 tablet po q 4 hours prn pain, Disp: 120 tablet, Rfl: 0    pantoprazole (PROTONIX) 40 MG tablet, Take 1 tablet (40 mg total) by mouth once daily., Disp: 90 tablet, Rfl: 1    potassium chloride (MICRO-K) 10 MEQ CpSR, Take 1 capsule (10 mEq total) by mouth 2 (two) times daily., Disp: 180 capsule, Rfl: 1    sucralfate (CARAFATE) 1 gram tablet, Take 1 tablet (1 g total) by mouth 4 (four) times daily before meals and nightly., Disp: 120 tablet, Rfl: 5          Objective:   Vitals:  Blood pressure (!) 149/86, pulse 75, temperature 98.2 °F (36.8 °C), weight 87.2 kg (192 lb 4.8 oz).    Physical Examination:   GEN: no apparent distress, comfortable  HEAD: atraumatic and normocephalic  EYES: no pallor, no icterus  ENT:  no pharyngeal erythema, external ears WNL; no nasal discharge  NECK: no masses, thyroid normal, trachea midline, no LAD/LN's, supple  CV: RRR with no murmur; normal pulse; normal S1 and S2; no pedal edema  CHEST: Normal respiratory effort; CTAB; distant  breath sounds; no wheeze or crackles  ABDOM: nontender and nondistended; soft; L/S NP, no rebound/guarding  MUSC/Skeletal: ROM normal; no crepitus; joints normal  EXTREM: no clubbing, cyanosis, inflammation or swelling  SKIN: no rashes, lesions, ulcers, petechiae   : no cvat  NEURO: grossly intact; motor/sensory WNL; ; no tremors  PSYCH: normal mood, affect and behavior  LYMPH: normal cervical, supraclavicular, axillary and groin LN's  BREASTS: L & R with out palpable mass    Labs:   Lab Results   Component Value Date    WBC 7.81 10/09/2020    HGB 11.8 (L) 10/09/2020    HCT 37.2 (L) 10/09/2020    MCV 87 10/09/2020     10/09/2020    CMP  Sodium   Date  Value Ref Range Status   10/09/2020 137 136 - 145 mmol/L Final     Potassium   Date Value Ref Range Status   10/09/2020 3.6 3.5 - 5.1 mmol/L Final     Chloride   Date Value Ref Range Status   10/09/2020 98 95 - 110 mmol/L Final     CO2   Date Value Ref Range Status   10/09/2020 30 (H) 23 - 29 mmol/L Final     Glucose   Date Value Ref Range Status   10/09/2020 122 (H) 70 - 110 mg/dL Final     BUN, Bld   Date Value Ref Range Status   10/09/2020 16 8 - 23 mg/dL Final     Creatinine   Date Value Ref Range Status   10/09/2020 0.8 0.5 - 1.4 mg/dL Final     Calcium   Date Value Ref Range Status   10/09/2020 9.3 8.7 - 10.5 mg/dL Final     Total Protein   Date Value Ref Range Status   09/29/2020 6.8 6.1 - 8.1 g/dL Final     Albumin   Date Value Ref Range Status   09/29/2020 3.7 3.6 - 5.1 g/dL Final     Total Bilirubin   Date Value Ref Range Status   09/29/2020 0.3 0.2 - 1.2 mg/dL Final     Alkaline Phosphatase   Date Value Ref Range Status   07/16/2020 39 35 - 144 U/L Final     AST   Date Value Ref Range Status   09/29/2020 38 (H) 10 - 35 U/L Final     ALT   Date Value Ref Range Status   09/29/2020 55 (H) 9 - 46 U/L Final     Anion Gap   Date Value Ref Range Status   10/09/2020 9 8 - 16 mmol/L Final     eGFR if    Date Value Ref Range Status   10/09/2020 >60.0 >60 mL/min/1.73 m^2 Final     eGFR if non    Date Value Ref Range Status   10/09/2020 >60.0 >60 mL/min/1.73 m^2 Final     Comment:     Calculation used to obtain the estimated glomerular filtration  rate (eGFR) is the CKD-EPI equation.        CBC, CMP,  Ca 19-9 have been ordered.    Pathology report from biopsy from September 17, 2020 returned adenocarcinoma of the pancreas.    CT scan was done at [facility].  It showed a 5.3 cm mass in the tail of the pancreas.  This mass encased the splenic artery and vein, it also abutted the stomach, duodenum, colon and there was several small lymph nodes in the area.  There was also a nodule  in the liver and adrenal gland of unclear significance.    MRI pancreatic mass, increased LN and hepatic metastases.    I have ordered a PET scan for staging.    Assessment:   (1) 69 y.o. male has locally advanced mass effect at the tail of the pancreas, biopsy is positive for adenocarcinoma of the pancreas.  I have asked him to see Dr. Marin the surgeon to see if he would be a candidate for surgery here, possibly after the administration of chemotherapy and/ or radiation therapy.  Inoperable dx.    (2) I have asked him to see  to get his opinion on neoadjuvant radiation or definitive radiation for local control.  Dr. Lopez, defer Rad Rx unless he developes pain increase or biliary obstruction sx.    Folfirinox q 2 weeks.    Alternatively Gemzar/ Abraxane weekly.    Other physicians involved in his care include Peter Mendoza MD, his primary care physician, Young Figueroa MD his GI physician of record, Guicho Schroeder MD, his pulmonologist and Dr. Shafer, his nephrologist.

## 2020-10-09 NOTE — DISCHARGE INSTRUCTIONS
To confirm, Your doctor has instructed you that procedure is scheduled for: October 12    1 Person can come with you the day of surgery     Endoscopy nurse will call the afternoon prior to surgery between 4:00 and 6:00 PM with the final arrival time.      Enter at [address] and come through front entrance.  You will be screened/ have temperature checked.    You may have 1 visitor who will also be screened.     Check in at registration.   After registration, proceed past gift shop and through glass door ( [facility] [place]) Check in at the nurses station to the left.   Do not eat or drink anything after midnight the night before your surgery - THIS INCLUDES  WATER, GUM, MINTS AND CANDY.  YOU MAY BRUSH YOUR TEETH BUT DO NOT SWALLOW           PLEASE NOTE:  The surgery schedule has many variables which may affect the time of your surgery case.  Family members should be available if your surgery time changes.  Plan to be here the day of your procedure between 4-6 hours.      DO NOT TAKE THESE MEDICATIONS 5-7 DAYS PRIOR to your procedure or per your surgeon's request: ASPIRIN, ALEVE, ADVIL, IBUPROFEN,  NIMO SELTZER, BC , FISH OIL , VITAMIN E, HERBALS  (May take Tylenol)      ONLY if you are prescribed any types of blood thinners such as:  Aspirin, Coumadin, Plavix, Pradaxa, Xarelto, Aggrenox, Effient, Eliquis, Savasya, Brilinta, or any other, ask your surgeon whether you should stop taking them and how long before surgery you should stop.  You may also need to verify with the prescribing physician if it is ok to stop your medication.                                                        IMPORTANT INSTRUCTIONS      · Do not smoke, vape or drink alcoholic beverages 24 hours prior to your procedure.  · Shower the night before  and sleep in a bed with clean sheets.  Do not sleep with a pet in the bed.     · If you wear contact lenses, dentures, hearing aids or glasses, bring a container to put them in during surgery  and give to a family member for safe keeping.    · Please leave all jewelry, piercing's and valuables at home.   · Wear rubber soled shoes (no flip flops).  · ONLY for patients requiring bowel prep, written instructions will be given by your doctor's office.  · If your doctor has scheduled you for an overnight stay, bring a small overnight bag with any personal items you need.    · Make arrangements in advance for transportation home by a responsible adult.      · You must make arrangements for transportation, TAXI'S, UBER'S OR LYFTS ARE NOT ALLOWED.        If you have any questions about these instructions, call (Monday - Friday)  Endoscopy [phone]

## 2020-10-12 PROBLEM — C25.9 MALIGNANT NEOPLASM OF PANCREAS: Status: ACTIVE | Noted: 2020-01-01

## 2020-10-12 NOTE — H&P
GASTROENTEROLOGY PRE-PROCEDURE H&P NOTE  Patient Name: Michael Watson  Patient MRN: [id number]  Patient : 1950    Service date: 10/12/2020    PCP: Peter Mendoza MD    No chief complaint on file.      HPI: Patient is a 69 y.o. male with PMHx as below here for evaluation of refractory pain w/ pancreatic cancer.     Past Medical History:  Past Medical History:   Diagnosis Date    Abdominal pain     radiates to mid back - stage 4 pancreatic cancer    Brain bleed     Chronically low serum potassium     growth on kidney     Diverticulosis     Hypertension     Left-sided weakness     from previous brain events    Neuropathy     bilateral up to knees    Primary adenocarcinoma of pancreas 2020    Skin cancer     Subdural hematoma 2019    surgery    Thyroid disease         Past Surgical History:  Past Surgical History:   Procedure Laterality Date    CHERISE HOLE FOR SUBDURAL HEMATOMA      [facility].Ms.    CARPAL TUNNEL RELEASE Bilateral     ENDOSCOPIC ULTRASOUND OF UPPER GASTROINTESTINAL TRACT N/A 2020    Procedure: ULTRASOUND, UPPER GI TRACT, ENDOSCOPIC;  Surgeon: Massimo Figueroa III, MD;  [facility];  Service: Endoscopy;  Laterality: N/A;    PENILE PROSTHESIS IMPLANT      Right shoulder repair      UVULECTOMY      removed hyoid bone         Home Medications:  Medications Prior to Admission   Medication Sig Dispense Refill Last Dose    allopurinoL (ZYLOPRIM) 100 MG tablet Take 1 tablet (100 mg total) by mouth once daily. 90 tablet 1 Past Week at Unknown time    cetirizine (ZYRTEC) 10 MG tablet Take 10 mg by mouth once daily.   10/11/2020 at Unknown time    coenzyme Q10 (CO Q-10) 100 mg capsule Take 100 mg by mouth once daily.   10/11/2020 at Unknown time    DULoxetine (CYMBALTA) 60 MG capsule Take 1 capsule (60 mg total) by mouth once daily. (Patient taking differently: Take 60 mg by mouth 2 (two) times daily. ) 90 capsule 1 10/11/2020 at  Unknown time    eplerenone (INSPRA) 50 MG Tab Take 1 tablet (50 mg total) by mouth 2 (two) times daily. 180 tablet 1 10/12/2020 at Unknown time    ezetimibe (ZETIA) 10 mg tablet Take 1 tablet (10 mg total) by mouth once daily. 90 tablet 1 10/11/2020 at Unknown time    famotidine (PEPCID) 20 MG tablet Take 1 tablet (20 mg total) by mouth 2 (two) times daily. 180 tablet 1 10/11/2020 at Unknown time    finasteride (PROSCAR) 5 mg tablet Take 1 tablet (5 mg total) by mouth once daily. 90 tablet 1 10/11/2020 at Unknown time    fluticasone propionate (FLONASE) 50 mcg/actuation nasal spray 1 spray (50 mcg total) by Each Nostril route once daily. (Patient taking differently: 1 spray by Each Nostril route 2 (two) times a day. ) 3 Bottle 2 10/11/2020 at Unknown time    hydrALAZINE (APRESOLINE) 50 MG tablet Take 1 tablet (50 mg total) by mouth 2 (two) times daily. 180 tablet 1 10/12/2020 at Unknown time    HYDROcodone-acetaminophen (NORCO)  mg per tablet Take 1 tablet by mouth daily as needed.    10/12/2020 at Unknown time    levothyroxine (SYNTHROID) 25 MCG tablet Take 1 tablet (25 mcg total) by mouth once daily. 90 tablet 1 10/12/2020 at Unknown time    lisinopriL-hydrochlorothiazide (ZESTORETIC) 20-25 mg Tab Take 1 tablet by mouth 2 (two) times a day. 180 tablet 1 10/11/2020 at Unknown time    lubiprostone (AMITIZA) 24 MCG Cap Take 1 capsule (24 mcg total) by mouth 2 (two) times daily with meals. 60 capsule 3 10/11/2020 at Unknown time    pantoprazole (PROTONIX) 40 MG tablet Take 1 tablet (40 mg total) by mouth once daily. 90 tablet 1 10/11/2020 at Unknown time    potassium chloride (MICRO-K) 10 MEQ CpSR Take 1 capsule (10 mEq total) by mouth 2 (two) times daily. 180 capsule 1 10/12/2020 at Unknown time    sucralfate (CARAFATE) 1 gram tablet Take 1 tablet (1 g total) by mouth 4 (four) times daily before meals and nightly. 120 tablet 5 10/11/2020 at Unknown time    cycloSPORINE (RESTASIS) 0.05 % ophthalmic  emulsion Place 1 drop into both eyes 2 (two) times daily. 30 each 5     indomethacin (INDOCIN) 25 MG capsule Take 25 mg by mouth 2 (two) times daily as needed.        krill/om3/dha/epa/om6/lip/astx (KRILL OIL, OMEGA 3 AND 6, ORAL) Take by mouth.       ondansetron (ZOFRAN-ODT) 4 MG TbDL 4 mg every 6 (six) hours as needed.           Inpatient Medications:   bupivacaine (PF) 0.25% (2.5 mg/ml)  20 mL Intrapleural Once     sodium chloride 0.9%    Review of patient's allergies indicates:   Allergen Reactions    Norvasc [amlodipine]     Penicillins     Statins-hmg-coa reductase inhibitors     Sulfa (sulfonamide antibiotics)     Sulfur Other (See Comments)       Social History:   Social History     Occupational History    Not on file   Tobacco Use    Smoking status: Former Smoker     Quit date:      Years since quittin.    Smokeless tobacco: Never Used   Substance and Sexual Activity    Alcohol use: Not Currently     Comment: occas    Drug use: Yes     Types: Marijuana     Comment: 1-2 joints per day    Sexual activity: Not on file       Family History:   Family History   Problem Relation Age of Onset    Cancer Mother         lung CA    Heart disease Father     Cancer Paternal Grandmother         lung CA       Review of Systems:  A 10 point review of systems was performed and was normal, except as mentioned in the HPI, including constitutional, HEENT, heme, lymph, cardiovascular, respiratory, gastrointestinal, genitourinary, neurologic, endocrine, psychiatric and musculoskeletal.      OBJECTIVE:    Physical Exam:  24 Hour Vital Sign Ranges: Temp:  [97.1 °F (36.2 °C)] 97.1 °F (36.2 °C)  Pulse:  [44] 44  Resp:  [18] 18  SpO2:  [97 %] 97 %  BP: (159)/(71) 159/71  Most recent vitals: BP (!) 159/71 (BP Location: Left arm, Patient Position: Lying)   Pulse (!) 44   Temp 97.1 °F (36.2 °C) (Oral)   Resp 18   SpO2 97%    GEN: well-developed, well-nourished, awake and alert, non-toxic appearing  adult  HEENT: PERRL, sclera anicteric, oral mucosa pink and moist without lesion  NECK: trachea midline; Good ROM  CV: regular rate and rhythm, no murmurs or gallops  RESP: clear to auscultation bilaterally, no wheezes, rhonci or rales  ABD: soft, non-tender, non-distended, normal bowel sounds  EXT: no swelling or edema, 2+ pulses distally  SKIN: no rashes or jaundice  PSYCH: normal affect    Labs:   Recent Labs     10/09/20  1128   WBC 7.81   MCV 87        Recent Labs     10/09/20  1128      K 3.6   CL 98   CO2 30*   BUN 16   *     No results for input(s): ALB in the last 72 hours.    Invalid input(s): ALKP, SGOT, SGPT, TBIL, DBIL, TPRO  No results for input(s): PT, INR, PTT in the last 72 hours.      IMPRESSION / RECOMMENDATIONS:  EUS / Neurolysis. Risks, benefits, alternative discussed in detail with patient / family regarding anticipated procedure and possible complications.     Massimo Figueroa III  10/12/2020  10:12 AM

## 2020-10-12 NOTE — ANESTHESIA POSTPROCEDURE EVALUATION
Anesthesia Post Evaluation    Patient: Michael Watson    Procedure(s) Performed: Procedure(s) (LRB):  ULTRASOUND, UPPER GI TRACT, ENDOSCOPIC (N/A)    Final Anesthesia Type: MAC    Patient location during evaluation: GI PACU  Patient participation: Yes- Able to Participate  Level of consciousness: awake and alert, oriented and awake  Post-procedure vital signs: reviewed and stable  Pain management: adequate  Airway patency: patent    PONV status at discharge: No PONV  Anesthetic complications: no      Cardiovascular status: blood pressure returned to baseline, hemodynamically stable and stable  Respiratory status: unassisted, spontaneous ventilation and room air  Hydration status: euvolemic  Follow-up not needed.  Comments: The patient states that he was comfortable for the procedure and is without recall the procedure.          Vitals Value Taken Time   /73 10/12/20 1115   Temp 97.8 10/12/20 1118   Pulse 67 10/12/20 1115   Resp 16 10/12/20 1118   SpO2 96 % 10/12/20 1115   Vitals shown include unvalidated device data.      No case tracking events are documented in the log.      Pain/Maura Score: No data recorded

## 2020-10-12 NOTE — [employer] PATIENT INSTRUCTIONS
Discharge Summary/Instructions after an Endoscopic Procedure  Patient Name: Michael Watson  Patient MRN: [id number]  Patient YOB: 1950 Monday, October 12, 2020  Massimo Figueroa III, MD  RESTRICTIONS:  During your procedure today, you received medications for sedation.  These   medications may affect your judgment, balance and coordination.  Therefore,   for 24 hours, you have the following restrictions:   - DO NOT drive a car, operate machinery, make legal/financial decisions,   sign important papers or drink alcohol.    ACTIVITY:  Today: no heavy lifting, straining or running due to procedural   sedation/anesthesia.  The following day: return to full activity including work.  DIET:  Eat and drink normally unless instructed otherwise.     TREATMENT FOR COMMON SIDE EFFECTS:  - Mild abdominal pain, nausea, belching, bloating or excessive gas:  rest,   eat lightly and use a heating pad.  - Sore Throat: treat with throat lozenges and/or gargle with warm salt   water.  - Because air was used during the procedure, expelling large amounts of air   from your rectum or belching is normal.  - If a bowel prep was taken, you may not have a bowel movement for 1-3 days.    This is normal.  SYMPTOMS TO WATCH FOR AND REPORT TO YOUR PHYSICIAN:  1. Abdominal pain or bloating, other than gas cramps.  2. Chest pain.  3. Back pain.  4. Signs of infection such as: chills or fever occurring within 24 hours   after the procedure.  5. Rectal bleeding, which would show as bright red, maroon, or black stools.   (A tablespoon of blood from the rectum is not serious, especially if   hemorrhoids are present.)  6. Vomiting.  7. Weakness or dizziness.  GO DIRECTLY TO THE NEAREST EMERGENCY ROOM IF YOU HAVE ANY OF THE FOLLOWING:      Difficulty breathing              Chills and/or fever over 101 F   Persistent vomiting and/or vomiting blood   Severe abdominal pain   Severe chest pain   Black, tarry stools   Bleeding- more than one  tablespoon   Any other symptom or condition that you feel may need urgent attention  Your doctor recommends these additional instructions:  If any biopsies were taken, your doctors clinic will contact you in 1 to 2   weeks with any results.  - Discharge patient to home.   - Patient has a contact number available for emergencies.  The signs and   symptoms of potential delayed complications were discussed with the   patient.  Return to normal activities tomorrow.  Written discharge   instructions were provided to the patient.   - Resume regular diet.   - Continue present medications.   - Monitor in recovery for hypotension over next 2 hours and imodium PRN for   diarrhea over next couple days.  For questions, problems or results please call your physician - Massimo Figueroa III, MD at Work:  [phone].  [facility], EMERGENCY ROOM PHONE NUMBER: [phone]  IF A COMPLICATION OR EMERGENCY SITUATION ARISES AND YOU ARE UNABLE TO REACH   YOUR PHYSICIAN - GO DIRECTLY TO THE EMERGENCY ROOM.  Massimo Figueroa III, MD  10/12/2020 11:08:36 AM  This report has been verified and signed electronically.  [employer]
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
## 2020-10-12 NOTE — TRANSFER OF CARE
Anesthesia Transfer of Care Note    Patient: Michael Watson    Procedure(s) Performed: Procedure(s) (LRB):  ULTRASOUND, UPPER GI TRACT, ENDOSCOPIC (N/A)    Patient location: GI    Anesthesia Type: MAC    Transport from OR: Transported from OR on room air with adequate spontaneous ventilation    Post pain: adequate analgesia    Post assessment: no apparent anesthetic complications    Post vital signs: stable    Level of consciousness: awake and alert    Nausea/Vomiting: no nausea/vomiting    Complications: none    Transfer of care protocol was followed      Last vitals:   Visit Vitals  BP (!) 148/76 (BP Location: Left arm, Patient Position: Lying)   Pulse 74   Temp 36 °C (96.8 °F) (Axillary)   Resp 18   SpO2 100%

## 2020-10-14 PROBLEM — C78.7 METASTASIS TO LIVER: Status: ACTIVE | Noted: 2020-01-01

## 2020-10-14 NOTE — TELEPHONE ENCOUNTER
----- Message from Lilo Garner sent at 10/14/2020  9:09 AM CDT -----  The patient's wife called and said the patient was supposed to start chemo today. There are no chart notes or chemo orders in the system for the patient. Please call Lilia back at [phone] and let her know when he will be starting his chemo.

## 2020-10-15 NOTE — PROGRESS NOTES
10/14/20      He has pancreatic cancer with liver metastases.  Today he and his wife and I reviewed the Gemzar and Abraxane chemotherapy regimen given weekly on days 01, 08, 15 of every 28 day cycle.  This could be given per his peripheral veins and would not require the PET placement of a mickie catheter or  midline.    Discuss premeds, following a blood counts weekly, possible need for Neupogen or Neulasta., watching him for possible peripheral neuropathy development while on chemotherapy.    He is having considerable pain on Norco  1 p.o. q.4-6 hours p.r.n.  Discussed keeping the Norco at 1 p.o. q.6 hours  Discussed the addition of oxycodone 5 mg 1 or 2 p.o. q.4 hours p.r.n. breakthrough pain.  Discussed the addition of Duragesic patch 25 mcg Q 72 hours, used as a pain preventive.    Waiting on authorization for the chemotherapy trial.  Discussed chemotherapy school, co-pay assistance if needed.  Return to clinic see me in 1 week

## 2020-10-16 NOTE — TELEPHONE ENCOUNTER
Pt scheduled for chemo school on 10/19 at 2:00  Pt to get covid testing on 10/19 at 1:30.     Spoke to pts wife.

## 2020-10-19 NOTE — TELEPHONE ENCOUNTER
----- Message from Marcia Flowers sent at 10/19/2020  1:52 PM CDT -----    1:45 He is having trouble with his gout. [facility]  [facility]  [facility] [address]  in [place]     [phone]  [facility]

## 2020-10-19 NOTE — PROGRESS NOTES
FOLLOW-UP APPOINTMENT    PATIENT:   Michael Watson  :    1950  MR#:    [id number]  DATE OF VISIT:  10/19/2020      Chief Complaint: Pancreatic Cancer/[employer]    HPI:   Mr. Michael Watson 68 yo male with Metastatic Pancreatic Cancer presents today for chemotherapy education.  He will be starting treatment with Abraxane and Gemzar for the above diagnosis. He knows to have labs drawn every Monday at [facility].    Depression Patient Health Questionnaire 10/19/2020 10/14/2020 10/8/2020 2020 1/15/2020 11/15/2019   Over the last two weeks how often have you been bothered by little interest or pleasure in doing things 0 0 0 0 0 0   Over the last two weeks how often have you been bothered by feeling down, depressed or hopeless 0 0 0 0 0 0   PHQ-2 Total Score 0 0 0 0 0 0           Review of Systems   Constitutional: Positive for appetite change. Negative for unexpected weight change.   HENT:   Negative for mouth sores.    Respiratory: Negative for cough and shortness of breath.    Cardiovascular: Negative for chest pain.   Gastrointestinal: Positive for abdominal pain. Negative for diarrhea.   Genitourinary: Negative for frequency.    Musculoskeletal: Positive for back pain.   Skin: Negative for rash.   Neurological: Negative for headaches.   Hematological: Negative for adenopathy.   Psychiatric/Behavioral: The patient is not nervous/anxious.        Oncology History   Primary adenocarcinoma of body of pancreas   2020 Initial Diagnosis    Primary adenocarcinoma of body of pancreas     10/7/2020 Cancer Staged    Staging form: Exocrine Pancreas, AJCC 8th Edition  - Clinical: Stage IV (cT3, cN2, cM1)     10/20/2020 -  Chemotherapy    Treatment Summary   Plan Name: OP PANC NAB-PACLITAXEL + GEMCITABINE Q4W  Treatment Goal: Palliative  Status: Active  Start Date: 10/20/2020 (Planned)  End Date: 2021 (Planned)  Provider: JOON Hendricks MD  Chemotherapy: PACLitaxel-protein bound (ABRAXANE) 125  mg/m2 infusion, 125 mg/m2, Intravenous, Clinic/HOD 1 time, 0 of 12 cycles  gemcitabine (GEMZAR) 1,000 mg/m2 in sodium chloride 0.9% 250 mL chemo infusion, 1,000 mg/m2, Intravenous, Clinic/HOD 1 time, 0 of 12 cycles     Metastasis to liver   10/14/2020 Initial Diagnosis    Metastasis to liver     10/20/2020 -  Chemotherapy    Treatment Summary   Plan Name: OP PANC NAB-PACLITAXEL + GEMCITABINE Q4W  Treatment Goal: Palliative  Status: Active  Start Date: 10/20/2020 (Planned)  End Date: 9/7/2021 (Planned)  Provider: JOON Hendricks MD  Chemotherapy: PACLitaxel-protein bound (ABRAXANE) 125 mg/m2 infusion, 125 mg/m2, Intravenous, Clinic/HOD 1 time, 0 of 12 cycles  gemcitabine (GEMZAR) 1,000 mg/m2 in sodium chloride 0.9% 250 mL chemo infusion, 1,000 mg/m2, Intravenous, Clinic/HOD 1 time, 0 of 12 cycles         Patient Active Problem List   Diagnosis    COURTNEY on CPAP    Essential hypertension    Idiopathic peripheral neuropathy    Degeneration of lumbar intervertebral disc    BPH with obstruction/lower urinary tract symptoms    Pure hypercholesterolemia    Cervical disc disease    History of subdural hematoma    Hyperaldosteronism    Gout    Acquired hypothyroidism    Onychomycosis    Primary adenocarcinoma of body of pancreas    Malignant neoplasm of pancreas    Metastasis to liver       Past Medical History:   Diagnosis Date    Abdominal pain     radiates to mid back - stage 4 pancreatic cancer    Brain bleed 2000    Chronically low serum potassium     growth on kidney     Diverticulosis     Hypertension     Left-sided weakness     from previous brain events    Neuropathy     bilateral up to knees    Primary adenocarcinoma of pancreas 09/2020    Skin cancer     Subdural hematoma 2019    surgery    Thyroid disease        Past Surgical History:   Procedure Laterality Date    [facility]    CARPAL TUNNEL RELEASE Bilateral      ENDOSCOPIC ULTRASOUND OF UPPER GASTROINTESTINAL TRACT N/A 2020    Procedure: ULTRASOUND, UPPER GI TRACT, ENDOSCOPIC;  Surgeon: Massimo Figueroa III, MD;  Location: [facility] ENDO;  Service: Endoscopy;  Laterality: N/A;    ENDOSCOPIC ULTRASOUND OF UPPER GASTROINTESTINAL TRACT N/A 10/12/2020    Procedure: ULTRASOUND, UPPER GI TRACT, ENDOSCOPIC;  Surgeon: Massimo Figueroa III, MD;  Location: [facility] ENDO;  Service: Endoscopy;  Laterality: N/A;    PENILE PROSTHESIS IMPLANT      Right shoulder repair      UVULECTOMY      removed hyoid bone        Social History     Socioeconomic History    Marital status:      Spouse name: Not on file    Number of children: Not on file    Years of education: Not on file    Highest education level: Not on file   Occupational History    Not on file   Social Needs    Financial resource strain: Not on file    Food insecurity     Worry: Not on file     Inability: Not on file    Transportation needs     Medical: Not on file     Non-medical: Not on file   Tobacco Use    Smoking status: Former Smoker     Quit date:      Years since quittin.8    Smokeless tobacco: Never Used   Substance and Sexual Activity    Alcohol use: Not Currently     Comment: occas    Drug use: Yes     Types: Marijuana     Comment: 1-2 joints per day    Sexual activity: Not on file   Lifestyle    Physical activity     Days per week: Not on file     Minutes per session: Not on file    Stress: Not on file   Relationships    Social connections     Talks on phone: Not on file     Gets together: Not on file     Attends Hinduism service: Not on file     Active member of club or organization: Not on file     Attends meetings of clubs or organizations: Not on file     Relationship status: Not on file   Other Topics Concern    Not on file   Social History Narrative    Not on file       Family History   Problem Relation Age of Onset    Cancer Mother         lung CA    Heart disease Father      Cancer Paternal Grandmother         lung CA         Current Outpatient Medications:     allopurinoL (ZYLOPRIM) 100 MG tablet, Take 1 tablet (100 mg total) by mouth once daily., Disp: 90 tablet, Rfl: 1    cetirizine (ZYRTEC) 10 MG tablet, Take 10 mg by mouth once daily., Disp: , Rfl:     coenzyme Q10 (CO Q-10) 100 mg capsule, Take 100 mg by mouth once daily., Disp: , Rfl:     cycloSPORINE (RESTASIS) 0.05 % ophthalmic emulsion, Place 1 drop into both eyes 2 (two) times daily., Disp: 30 each, Rfl: 5    DULoxetine (CYMBALTA) 60 MG capsule, Take 1 capsule (60 mg total) by mouth once daily. (Patient taking differently: Take 60 mg by mouth 2 (two) times daily. ), Disp: 90 capsule, Rfl: 1    eplerenone (INSPRA) 50 MG Tab, Take 1 tablet (50 mg total) by mouth 2 (two) times daily., Disp: 180 tablet, Rfl: 1    ezetimibe (ZETIA) 10 mg tablet, Take 1 tablet (10 mg total) by mouth once daily., Disp: 90 tablet, Rfl: 1    famotidine (PEPCID) 20 MG tablet, Take 1 tablet (20 mg total) by mouth 2 (two) times daily., Disp: 180 tablet, Rfl: 1    fentaNYL (DURAGESIC) 25 mcg/hr, Place 1 patch onto the skin every 72 hours., Disp: 10 patch, Rfl: 0    finasteride (PROSCAR) 5 mg tablet, Take 1 tablet (5 mg total) by mouth once daily., Disp: 90 tablet, Rfl: 1    fluticasone propionate (FLONASE) 50 mcg/actuation nasal spray, 1 spray (50 mcg total) by Each Nostril route once daily. (Patient taking differently: 1 spray by Each Nostril route 2 (two) times a day. ), Disp: 3 Bottle, Rfl: 2    hydrALAZINE (APRESOLINE) 50 MG tablet, Take 1 tablet (50 mg total) by mouth 2 (two) times daily., Disp: 180 tablet, Rfl: 1    HYDROcodone-acetaminophen (NORCO)  mg per tablet, Take 1 tablet by mouth every 6 (six) hours as needed for Pain., Disp: 120 tablet, Rfl: 0    indomethacin (INDOCIN) 25 MG capsule, Take 25 mg by mouth 2 (two) times daily as needed. , Disp: , Rfl:     krill/om3/dha/epa/om6/lip/astx (KRILL OIL, OMEGA 3 AND 6, ORAL),  Take by mouth., Disp: , Rfl:     levothyroxine (SYNTHROID) 25 MCG tablet, Take 1 tablet (25 mcg total) by mouth once daily., Disp: 90 tablet, Rfl: 1    lisinopriL-hydrochlorothiazide (ZESTORETIC) 20-25 mg Tab, Take 1 tablet by mouth 2 (two) times a day., Disp: 180 tablet, Rfl: 1    lubiprostone (AMITIZA) 24 MCG Cap, Take 1 capsule (24 mcg total) by mouth 2 (two) times daily with meals., Disp: 60 capsule, Rfl: 3    oxyCODONE (ROXICODONE) 5 MG immediate release tablet, Take 1-2 tablet po q 4 hours prn pain, Disp: 120 tablet, Rfl: 0    pantoprazole (PROTONIX) 40 MG tablet, Take 1 tablet (40 mg total) by mouth once daily., Disp: 90 tablet, Rfl: 1    potassium chloride (MICRO-K) 10 MEQ CpSR, Take 1 capsule (10 mEq total) by mouth 2 (two) times daily., Disp: 180 capsule, Rfl: 1    sucralfate (CARAFATE) 1 gram tablet, Take 1 tablet (1 g total) by mouth 4 (four) times daily before meals and nightly., Disp: 120 tablet, Rfl: 5    ondansetron (ZOFRAN-ODT) 8 MG TbDL, Take 1 tablet (8 mg total) by mouth every 8 (eight) hours as needed., Disp: 30 tablet, Rfl: 5    promethazine (PHENERGAN) 25 MG tablet, Take 1 tablet (25 mg total) by mouth every 4 to 6 hours as needed., Disp: 30 tablet, Rfl: 5    Review of patient's allergies indicates:   Allergen Reactions    Norvasc [amlodipine]     Penicillins     Statins-hmg-coa reductase inhibitors     Sulfa (sulfonamide antibiotics)     Sulfur Other (See Comments)       Physcial Examination  VITAL SIGNS:    Body surface area is 2.03 meters squared.   Pain Assessment  Vitals:    10/19/20 1355   BP: 125/81   Pulse: 99   Resp: 19   Temp: 97.7 °F (36.5 °C)   Weight: 85.5 kg (188 lb 8 oz)   PainSc:   3   PainLoc: Foot     Quality:burning and constant  Onset: gradual  Duration: more than 1 year  What relieves the pain? Pain Medication  What cause or increases the pain? Standing, Laying, Walking and Lifting  Plan: Patient is to continue with current pain medications.     Wt Readings  from Last 5 Encounters:   10/19/20 85.5 kg (188 lb 8 oz)   10/14/20 84.6 kg (186 lb 8 oz)   10/09/20 86 kg (189 lb 9.5 oz)   10/08/20 87.2 kg (192 lb 4.8 oz)   10/07/20 87.1 kg (192 lb)       GENERAL:  Michael Watson is healthy-appearing 69 y.o. male, in no distress.   EYES:   Pupils equal, round, reactive.  Conjunctivae, sclera and lids normal.  HEENT: Head normocephalic and atraumatic, without alopecia.  Oropharynx is unremarkable.  No icterus, jaundice, stomatitis, mucositis, or ulceration is noted.  Ears are clear and unremarkable.  Nose, nares, and septum are unremarkable.    NECK:   No masses.  Thyroid and trachea are normal.    BREASTS:  Deferred.  RESPIRATORY: Clear to auscultation bilaterally.  Symmetrically effortless expansion.  No wheezing and no stridor.    CV: Heart reveals regular rate and rhythm without murmur, rub, or gallops.  ABDOMEN: Soft, non-tender.  No masses, no hernias, and no rebound or rigidity are noted.  /RECTAL:  Deferred.  LYMPHATICS: No preauricular, submandibular, cervical, supraclavicular, axillary, lymphadenopathy.  MUSCULOSKELETAL:Fair musculature, no atrophy.  No arthritic changes.  No edema or cyanosis. Back is without gross abnormal curvature.   NEUROLOGICAL: Cranial nerves II-XII grossly intact.  Motor and sensory exam intact.  SKIN:   No lesions, bruises, petechiae or rashes.  Good turgor.    PSYCHIATRIC: Patient is alert and oriented to time, place and person.  Mood and affect are appropriate.         Laboratory and Radiology   Lab Results   Component Value Date    WBC 7.81 10/09/2020    RBC 4.29 (L) 10/09/2020    HGB 11.8 (L) 10/09/2020    HCT 37.2 (L) 10/09/2020    MCV 87 10/09/2020    MCH 27.5 10/09/2020    MCHC 31.7 (L) 10/09/2020    RDW 13.2 10/09/2020     10/09/2020    MPV 10.2 10/09/2020    GRAN 5.9 10/09/2020    GRAN 75.4 (H) 10/09/2020    LYMPH 1.1 10/09/2020    LYMPH 14.2 (L) 10/09/2020    MONO 0.6 10/09/2020    MONO 7.2 10/09/2020    EOS 0.1  10/09/2020    BASO 0.08 10/09/2020    EOSINOPHIL 1.4 10/09/2020    BASOPHIL 1.0 10/09/2020     BMP  Lab Results   Component Value Date     10/09/2020    K 3.6 10/09/2020    CL 98 10/09/2020    CO2 30 (H) 10/09/2020    BUN 16 10/09/2020    CREATININE 0.8 10/09/2020    CALCIUM 9.3 10/09/2020    ANIONGAP 9 10/09/2020    ESTGFRAFRICA >60.0 10/09/2020    EGFRNONAA >60.0 10/09/2020     Lab Results   Component Value Date    ALT 55 (H) 09/29/2020    AST 38 (H) 09/29/2020    ALKPHOS 39 07/16/2020    BILITOT 0.3 09/29/2020     Results for orders placed or performed during the hospital encounter of 10/05/20 (from the past 2160 hour(s))   CT Chest Abdomen Pelvis With Contrast    Impression    In this patient with pancreatic adenocarcinoma, there is a 4.3 x 6.3 cm hypodensity within the pancreatic body/tail and scattered soft tissue densities about the peritoneal surfaces of the abdomen and pelvis concerning for peritoneal implants.    1.6 cm left adrenal nodule.  Correlation with outside imaging would be helpful to determine chronicity.  In the setting of malignancy consider further evaluation with CT adrenal mass protocol versus PET-CT as clinically warranted.    Pulmonary nodules in both lungs, the largest measuring 0.6 cm in the right middle lobe.  These nodules can be followed with continued expected oncologic surveillance.    Several hypodensities along the inferior margin of the right hepatic lobe.  Attention on follow-up recommended.    Trace pelvic ascites.    Additional findings detailed above.    This report was flagged in Epic as abnormal.    Electronically signed by resident: Dash Christie MD  Date:    10/05/2020  Time:    14:39    Electronically signed by: Malcom Lowery MD  Date:    10/05/2020  Time:    15:39           TITLE: PLAN OF CARE FOR THE CHEMOTHERAPY PATIENT / TEACHING PROTOCOL    PURPOSE: To involve the patient / significant other in the plan of care and to provide teaching to the significant other  & patient receiving chemotherapy.    LEVEL: Independent.    CONTENT: The Plan of Care for the chemotherapy patient is individualized and appropriate to the patients needs, strengths, limitations, & goals.  Education includes information regarding chemotherapy side effects, the treatment itself, and self-care  Activities.    GOAL / OUTCOME STANDARDS    PHYSIOLOGIC: The client will remain free or experience minimal side effects or toxicities throughout the chemotherapy treatment period.     PSYCHOLOGIC: The client/significant others will demonstrate positive coping mechanisms in relation to chemotherapy and its side effects.      COGINITIVE: The client/significant others will verbalize understanding of self-care measure to avoid/minimize side effects of the chemotherapy regime.    EVALUATION / COMMENT KEY:    V = Audiovisual/Video  S = Successfully meets outcome  N = Needs further instruction  NA = Not applicable to the patient  P = Previous knowledge  U = Unable to comprehend  * = See progress notes          PLAN OF CARE  INFORMATION TO BE DELIVERED / NURSING INTERVENTIONS DATE EVALUATION   1. Assessment of client/caregiver,          knowledge of cancer diagnosis,          and chemotherapy as a treatment.  1a. Evaluate patient/caregiver learning ability     b. Plan teaching sessions with patient/caregiver according to needs and present anxiety level/ability to learn.     c. Provide Chemotherapy Education Packet,         Mouth Care Protocol,          Specific Patient Education Sheets. 10/19/2020  S   2. Individual chemotherapy treatment          plan.  2a. Review of Chemotherapy Education handout from [email or web address]              10/19/2020     S   3. Knowledge Deficit & Self-Management of general side effects common to all chemotherapy:  a. Nausea/Vomiting   b.   Diarrhea  c. Mouth Care  d. Dental care  e. Constipation  f. Hair Loss  g. Potential for infection  h. Fatigue   3a. Reinforce that the majority of side  effects from chemotherapy are reversible and are  controlled both in the hospital and at home        (blood counts recover, hair grows back).   b.  Refer to the following for reinforcement of         information post-treatment:  1. Mouth Care Protocol.  2. Bowel Protocol for constipation or diarrhea.  3.  Drug Specific Chemotherapy Information Sheets for each medication patient receiving.    10/19/2020     S     PLAN OF CARE  INFORMATION TO BE DELIVERED / NURSING INTERVENTIONS DATE EVALUATION   h. Potential for bleeding         i. Potential anemia/fatigue         j. Potential sunburn         k. Birth control measures  l. Safety measures post treatment 4.  Chemotherapy Home Care Instruction  and Safety Information Sheet.  A. patient/caregivers to thoroughly cook shellfish (shrimp, crab, etc) to decrease the chance of infection.    B.  Use sunscreen and protective clothing while in the sun.   10/19/2020      4. Knowledge deficit & Self Management of Drug Specific  Side Effects.    a. BLADDER EFFECTS         (Hemorrhagic Cystitis)                     Preventable with adequate hydration; occurs 2-3 days or more post treatment.     1.  Instruct patient to:   a.   Void at least every 2 hours; increase intake.   b.   DO NOT hold urine; go when urge is felt.   c.    Empty bladder at bedtime and on          awakening.   d.   Observe for color changes (red to tea            colored), amount and frequency changes.   e.   Notify oncologist of any abnormalities           in urine or voiding or if you cannot               drink adequate fluids.    10/19/2020     S    b.   CHANGES IN URINE        COLOR:         1.   Instruct patient:   a.   Most evident in first 2-3 voidings after            administration.  b. Lasts less than 24 hours.  c. If urine is discolored 2 or more days post- treatment, notify oncologist.      10/19/2020 S   c.    KIDNEY EFFECTS           (Nephrotoxicity)   1.  Instruct patient to:  a.   Drink 8-16 glasses  of fluid/day the day   pre-treatment and 3-4 days post-treatment to maintain hydration; the best way to minimize kidney problems.  b.   Notify oncologist immediately if unable to drink fluids or if changes are noted in urinary elimination.      10/19/2020     S   a. PULMONARY TOXICITY    1. Instruct patient to report symptoms such as shortness of breath, chest pain, shallow breathing, or chest wall discomfort to physician.  2. Reinforce preventative measures used by the health care team.  a. Baseline and periodic PFT and chest x-ray.   10/19/2020   S     PLAN OF CARE INFORMATION TO BE DELIVERED / NURSING INTERVENTIONS DATE EVALUATION   b. NERVE & MUSCLE EFFECTS (neurotoxocity; neuropathy, possible visual/hearing changes)        3. Instruct patient to:    a. Report numbness or tingling of the hands/feet, loss of fine motor movement (buttoning shirt, tying shoelaces), or gait changes to your oncologist.  b. If numbness/tingling are present:   protect feet with shoes at all times.   Use gloves for washing dishes/gardening & potholders in kitchen.       10/19/2020   S   c. CARDIOTOXICITY  Decreased effectiveness of             cardiac function. Effective are                  cumulative and irreversible.                                    CARDIAC ARRYTHMIAS              4   Instruct:  a. Heart function may be tested before treatment and perdiocally during treatment.  b. Notify oncologist of irregular pulse, palpitations, shortness of breath, or swelling in lower extremities/feet.         Can cause arrhythmias on infusion that resolve once infusion discontinued. Instruct nurse if any irregularity felt.    10/19/2020   S   d. EXTRAVASTION  Occurs when vesicants leak outside of vein and cause damage to the skin and underlying tissues.   1. Reinforce preventive measures used to avoid complications.  a. Fresh IV site or central line monitored continuously with vesicant IVP.  b. Continuous infusion via central line site and  blood return monitored periodically around the clock.  2. Instruct to:  a. Notify nurse of any discomfort, burning, stinging, etc. at IV site during chemotherapy administration.  b. Notify oncologist of any redness, pain, or swelling at IV site after discharge from hospital.   10/19/2020   S   e. HYPERSENSITIVITY can happen with any medication.   1. Instruct patient:  a. Nurse is with them during the initial part of treatment and will be close by to monitor.  b. Pre-medication ordered by the oncologist must be taken on time. If doses are missed, treatment will need to be re-scheduled.  c. Skin redness, itching, or hives appearing after discharge should be reported to oncologist. 10/19/2020   S       PLAN OF CARE INFORMATION TO BE DELIVERED / NURSING INTERVENTIONS DATE EVALUATION   f. FLU-LIKE SYNDROME      1. Instruct patient symptoms are hard to prevent and may include fever, shaking chills, muscle and body aches.  a. Taking prescribed medications from physician if needed.  b. Adequate fluids are important.    2. Reinforce the need to call if temperature is         elevated to 100.4 or more  10/19/2020   S   g. HAND-FOOT SYNDROME  causes painful, symmetric swelling and redness of palms and soles                  5. Instruct patient to report any numbness or tingling in the hands or feet.  6. Explain prevention techniques, such as     a. Use heavy moisturizers to lessen skin dryness and itching, but to avoid if skin is cracked or broken  b. Bathe in tepid water, use non-perfumed soap, and wash gently. Baths with oatmeal or diluted baking soda may be soothing.  c. Avoid tight fitting shoes and repetitive actions, such as rubbing hands or applying pressure to hands/feet.  7. Review measures to take should syndrome occur:  a. Cold compresses and elevation for          edema  b. Pain medications and other measures as ordered by oncologist.   4.   Syndrome resolves few weeks after therapy. 10/19/2020   S   5. DISCHARGE  PLANNING /        EDUCATION 1.    Explain importance of compliance with follow- up  tests (CBC, CMP).  2.    Verify patient/caregiver know:  a.    Oncologists office phone number.  b.    Dates of follow-up appointments.  c.    Prescriptions given for nausea  3.   Review side effects to monitor and notify          oncologist about.  4.   Reinforce the need for patient and caregivers to:  a.    Review information given.  b.    Call oncologists office with questions          or symptoms  5.   Provide [facility] [place] Brochure make referrals if needed for financial or .   10/19/2020   S     PROGRESS NOTES: I met with the patient and his wife today for chemotherapy education. he will be starting treatment with Abraxane and Gemzar . We discussed the mechanism of action, potential side effects of this treatment as well as ways he can manage them at home. Some of these side effects include but or not limited to fever, nausea, vomiting, decreased appetite, fatigue, weakness, cytopenias, myalgia/arthralgia, constipation, diarrhea, bleeding, headache, shortness of breath, nail changes, taste change, hair thinning/loss, mood disturbances, or edema. We also discussed dietary modifications he should make although this will be discussed in more detail with the dietician. he was provided with anti-emetic medication, a copy of all of the information we discussed today as well as our contact information. he will be provided a schedule on his first day of treatment. We will obtain labs on a weekly basis and the patient will follow-up with the physician for toxicity monitoring throughout treatment. All questions were answered and an informed consent was obtained. he was reminded to certainly contact us sooner if needed.  Attached to the patients folder and discussed with the patient the 24 hour/ 7 days a week after hours telephone number for the physician.  Patient notified to call anytime 24/7 because their is a  physician on call for any problems that may arise.  Patient also notified to report to [facility] if they can not get in touch with a physician after hours.  Discussed the five wishes booklet with the patient and their family.           Assessment/Plan     ICD-10-CM ICD-9-CM   1. Malignant neoplasm of body of pancreas  C25.1 157.1   2. Metastasis to liver  C78.7 197.7   3. Nausea  R11.0 787.02      Follow up with Dr. Alcaraz tomorrow as scheduled.    Medications Ordered:  Zofran 8mg 1 tab PO Q8h prn nausea  Phenergan 25mg PO Q4-6h prn nausea      [facility] [facility]  CMP weekly        Total Face to Face Time: 60 minutes face to face with the patient and their family discussing the chemotherapy side-effects and when to call our office.   Electronically signed by: Liz Stern, MSN, APRN, AGNP-C, OCN      Answers for HPI/ROS submitted by the patient on 10/17/2020   visual disturbance: No

## 2020-10-21 NOTE — PROGRESS NOTES
I met with patient during [id number] Abraxane, Gemzar. Follow up education given on side effects including body aches, rash, nausea and swelling. Pt verbalized understanding to all. Pt states no questions or needs at this time. My contact information given to patient. Nurse navigator to follow as needed.

## 2020-10-21 NOTE — PROGRESS NOTES
Medical Nutrition Therapy Oncology Progress Note      Patient's PCP:Peter Mendoza MD  Referring Provider: Liz Stern    Recommendations/Interventions     RD Notes  Mr. Watson is a 69 y.o. male with metastatic pancreatic cancer to the liver. RD met with pt during initial rad onc consult. Nutrition f/u completed today while pt is here today for his first chemo infusion with Gemzar/Abraxane. Today he endorses decreased abdominal pain after eating r/t more consistent BMs now that he has resumed Amitiza. He reports that his appetite is slightly improved, but he has lost a few more pounds since our last visit. CW: 188#. Noted 3# wt loss over the past 2 weeks. He is supplementing his intake with Ensure Plant-based protein shakes 1-2x/day. He reports that he is working on increasing his fluid intake; he knows that he is not drinking as much as he should. He is inquiring about a plant-based diet; his daughter is trying to convince him that a plant-based diet is best at this time.     Plan  1. Reinforced importance of adequate nutritional + fluid intake during treatment course.   2. Educated pt on plant-based diet & discussed benefits of this while undergoing treatment- provided printed handout on diet discussed.   3. Ensure plant-based protein shakes TID for supplemental intake.   4. Bowel regimen as prescribed.   5. Pt has RD contact info. Encouraged him to call with any questions/concerns.    Subjective:        Patient ID: Michael Watson is a 69 y.o. male.    Chief Complaint: Patient Education      Past Medical History:   Diagnosis Date    Abdominal pain     radiates to mid back - stage 4 pancreatic cancer    Brain bleed 2000    Chronically low serum potassium     growth on kidney     Diverticulosis     Hypertension     Left-sided weakness     from previous brain events    Neuropathy     bilateral up to knees    Primary adenocarcinoma of pancreas 09/2020    Skin  cancer     Subdural hematoma 2019    surgery    Thyroid disease        Past Surgical History:   Procedure Laterality Date    CHERISE HOLE FOR SUBDURAL HEMATOMA  2019    [facility].Ms.    CARPAL TUNNEL RELEASE Bilateral     ENDOSCOPIC ULTRASOUND OF UPPER GASTROINTESTINAL TRACT N/A 2020    Procedure: ULTRASOUND, UPPER GI TRACT, ENDOSCOPIC;  Surgeon: Massimo Figueroa III, MD;  Location: [facility] END[facility];  Service: Endoscopy;  Laterality: N/A;    ENDOSCOPIC ULTRASOUND OF UPPER GASTROINTESTINAL TRACT N/A 10/12/2020    Procedure: ULTRASOUND, UPPER GI TRACT, ENDOSCOPIC;  Surgeon: Massimo Figueroa III, MD;  Location: [facility] ENDO;  Service: Endoscopy;  Laterality: N/A;    PENILE PROSTHESIS IMPLANT      Right shoulder repair      UVULECTOMY      removed hyoid bone        Social History     Socioeconomic History    Marital status:      Spouse name: Not on file    Number of children: Not on file    Years of education: Not on file    Highest education level: Not on file   Occupational History    Not on file   Social Needs    Financial resource strain: Not on file    Food insecurity     Worry: Not on file     Inability: Not on file    Transportation needs     Medical: Not on file     Non-medical: Not on file   Tobacco Use    Smoking status: Former Smoker     Quit date:      Years since quittin.8    Smokeless tobacco: Never Used   Substance and Sexual Activity    Alcohol use: Not Currently     Comment: occas    Drug use: Yes     Types: Marijuana     Comment: 1-2 joints per day    Sexual activity: Not on file   Lifestyle    Physical activity     Days per week: Not on file     Minutes per session: Not on file    Stress: Not on file   Relationships    Social connections     Talks on phone: Not on file     Gets together: Not on file     Attends Orthodoxy service: Not on file     Active member of club or organization: Not on file     Attends meetings of clubs or  organizations: Not on file     Relationship status: Not on file   Other Topics Concern    Not on file   Social History Narrative    Not on file       Family History   Problem Relation Age of Onset    Cancer Mother         lung CA    Heart disease Father     Cancer Paternal Grandmother         lung CA       Review of patient's allergies indicates:   Allergen Reactions    Norvasc [amlodipine]     Penicillins     Statins-hmg-coa reductase inhibitors     Sulfa (sulfonamide antibiotics)     Sulfur Other (See Comments)       Current Outpatient Medications:     allopurinoL (ZYLOPRIM) 100 MG tablet, Take 1 tablet (100 mg total) by mouth once daily., Disp: 90 tablet, Rfl: 1    cetirizine (ZYRTEC) 10 MG tablet, Take 10 mg by mouth once daily., Disp: , Rfl:     coenzyme Q10 (CO Q-10) 100 mg capsule, Take 100 mg by mouth once daily., Disp: , Rfl:     cycloSPORINE (RESTASIS) 0.05 % ophthalmic emulsion, Place 1 drop into both eyes 2 (two) times daily., Disp: 30 each, Rfl: 5    DULoxetine (CYMBALTA) 60 MG capsule, Take 1 capsule (60 mg total) by mouth once daily. (Patient taking differently: Take 60 mg by mouth 2 (two) times daily. ), Disp: 90 capsule, Rfl: 1    eplerenone (INSPRA) 50 MG Tab, Take 1 tablet (50 mg total) by mouth 2 (two) times daily., Disp: 180 tablet, Rfl: 1    ezetimibe (ZETIA) 10 mg tablet, Take 1 tablet (10 mg total) by mouth once daily., Disp: 90 tablet, Rfl: 1    famotidine (PEPCID) 20 MG tablet, Take 1 tablet (20 mg total) by mouth 2 (two) times daily., Disp: 180 tablet, Rfl: 1    fentaNYL (DURAGESIC) 25 mcg/hr, Place 1 patch onto the skin every 72 hours., Disp: 10 patch, Rfl: 0    finasteride (PROSCAR) 5 mg tablet, Take 1 tablet (5 mg total) by mouth once daily., Disp: 90 tablet, Rfl: 1    fluticasone propionate (FLONASE) 50 mcg/actuation nasal spray, 1 spray (50 mcg total) by Each Nostril route once daily. (Patient taking differently: 1 spray by Each Nostril route 2 (two) times a day.  ), Disp: 3 Bottle, Rfl: 2    hydrALAZINE (APRESOLINE) 50 MG tablet, Take 1 tablet (50 mg total) by mouth 2 (two) times daily., Disp: 180 tablet, Rfl: 1    HYDROcodone-acetaminophen (NORCO)  mg per tablet, Take 1 tablet by mouth every 6 (six) hours as needed for Pain., Disp: 120 tablet, Rfl: 0    indomethacin (INDOCIN) 25 MG capsule, Take 1 capsule (25 mg total) by mouth 2 (two) times daily as needed., Disp: 60 capsule, Rfl: 1    krill/om3/dha/epa/om6/lip/astx (KRILL OIL, OMEGA 3 AND 6, ORAL), Take by mouth., Disp: , Rfl:     levothyroxine (SYNTHROID) 25 MCG tablet, Take 1 tablet (25 mcg total) by mouth once daily., Disp: 90 tablet, Rfl: 1    lisinopriL-hydrochlorothiazide (ZESTORETIC) 20-25 mg Tab, Take 1 tablet by mouth 2 (two) times a day., Disp: 180 tablet, Rfl: 1    lubiprostone (AMITIZA) 24 MCG Cap, Take 1 capsule (24 mcg total) by mouth 2 (two) times daily with meals., Disp: 60 capsule, Rfl: 3    ondansetron (ZOFRAN-ODT) 8 MG TbDL, Take 1 tablet (8 mg total) by mouth every 8 (eight) hours as needed., Disp: 30 tablet, Rfl: 5    oxyCODONE (ROXICODONE) 5 MG immediate release tablet, Take 1-2 tablet po q 4 hours prn pain, Disp: 120 tablet, Rfl: 0    pantoprazole (PROTONIX) 40 MG tablet, Take 1 tablet (40 mg total) by mouth once daily., Disp: 90 tablet, Rfl: 1    potassium chloride (MICRO-K) 10 MEQ CpSR, Take 1 capsule (10 mEq total) by mouth 2 (two) times daily., Disp: 180 capsule, Rfl: 1    promethazine (PHENERGAN) 25 MG tablet, Take 1 tablet (25 mg total) by mouth every 4 to 6 hours as needed., Disp: 30 tablet, Rfl: 5    sucralfate (CARAFATE) 1 gram tablet, Take 1 tablet (1 g total) by mouth 4 (four) times daily before meals and nightly., Disp: 120 tablet, Rfl: 5  No current facility-administered medications for this visit.     Facility-Administered Medications Ordered in Other Visits:     alteplase injection 2 mg, 2 mg, Intra-Catheter, PRN, R Andrew Hendricks MD    heparin, porcine (PF) 100  unit/mL injection flush 500 Units, 500 Units, Intravenous, PRN, JOON Hendricks MD    sodium chloride 0.9% 250 mL flush bag, , Intravenous, 1 time in Clinic/HOD, JOON Hendricks MD    sodium chloride 0.9% flush 10 mL, 10 mL, Intravenous, PRN, JOON Hendricks MD    All medications and past history have been reviewed.    OP PANC NAB-PACLITAXEL + GEMCITABINE Q4W      Treatment Goal:   Palliative      Status:   Active      Start Date:   10/21/2020      End Date:   9/7/2021 (Planned)      Provider:   JOON Hendricks MD      Chemotherapy:   PACLitaxel-protein bound (ABRAXANE) 125 mg/m2 = 260 mg in 52 mL infusion, 125 mg/m2 = 260 mg, Intravenous, Clinic/HOD 1 time, 1 of 12 cycles    Administration: 260 mg (10/21/2020)        gemcitabine 2,040 mg in sodium chloride 0.9% 250 mL chemo infusion, 1,000 mg/m2 = 2,040 mg, Intravenous, Clinic/HOD 1 time, 1 of 12 cycles    Administration: 2,040 mg (10/21/2020)      Objective:        Wt Readings from Last 1 Encounters:   10/21/20 1017 85.4 kg (188 lb 4.8 oz)       Last Labs:  Glucose   Date Value Ref Range Status   10/09/2020 122 (H) 70 - 110 mg/dL Final   09/29/2020 127 65 - 139 mg/dL Final     Comment:               Non-fasting reference interval          BUN, Bld   Date Value Ref Range Status   10/09/2020 16 8 - 23 mg/dL Final   09/29/2020 12 7 - 25 mg/dL Final     Creatinine   Date Value Ref Range Status   10/09/2020 0.8 0.5 - 1.4 mg/dL Final   09/29/2020 0.84 0.70 - 1.18 mg/dL Final     Comment:     For patients >49 years of age, the reference limit  for Creatinine is approximately 13% higher for people  identified as -American.          Sodium   Date Value Ref Range Status   10/09/2020 137 136 - 145 mmol/L Final   09/29/2020 141 135 - 146 mmol/L Final     Potassium   Date Value Ref Range Status   10/09/2020 3.6 3.5 - 5.1 mmol/L Final   09/29/2020 3.8 3.5 - 5.3 mmol/L Final     No results found for: PHOS  Calcium   Date Value Ref Range Status   10/09/2020 9.3 8.7 -  10.5 mg/dL Final   09/29/2020 9.2 8.6 - 10.3 mg/dL Final     No results found for: PREALBUMIN  Total Protein   Date Value Ref Range Status   09/29/2020 6.8 6.1 - 8.1 g/dL Final   07/16/2020 7.2 6.1 - 8.1 g/dL Final     Cholesterol   Date Value Ref Range Status   07/16/2020 190 <200 mg/dL Final     No results found for: HGBA1C  Hemoglobin   Date Value Ref Range Status   10/09/2020 11.8 (L) 14.0 - 18.0 g/dL Final   09/29/2020 12.5 (L) 13.2 - 17.1 g/dL Final     Hematocrit   Date Value Ref Range Status   10/09/2020 37.2 (L) 40.0 - 54.0 % Final   09/29/2020 37.6 (L) 38.5 - 50.0 % Final     No results found for: IRON  No components found for: FROLATE  No results found for: QORYYTCT36NP  WBC   Date Value Ref Range Status   10/09/2020 7.81 3.90 - 12.70 K/uL Final   09/29/2020 8.0 3.8 - 10.8 Thousand/uL Final       Assessment:     Nutrition/Diet History     Patient Reported Diet/Restrictions/Preferences: regular diet   Food Allergies: NKFA  Factors Affecting Nutritional Intake: pancreatic cancer    Estimated/Assessed Needs     Weight Used For Calorie Calculations: 86.8 kg (191 lb)  Energy Calorie Requirements (kcal): 0326-2800 kcal/day   Energy Need Method: 25-30 Kcal/kg  Protein Requirements: 104-130 g/day   Protein Need Method: 1.2-1.5 g/kg  Fluid Requirements: 2200 ml/day  Estimated Fluid Requirement Method: 1ml/kcal      Nutrition Support  N/A    Evaluation of Received Nutrient/Fluid Intake     Calorie Intake: not meeting needs  Protein Intake: not meeting needs  Fluid Intake: not meeting needs  Tolerance: tolerating  % Intake of Estimated Energy Needs: 50-75 %      Nutrition Diagnosis Related to (Etiology) As Evidenced By (Signs/Symptoms)   Inadequate energy intake Decreased appetite Unintentional wt loss of 3# within the past two weeks.       RD Notes  Mr. Watson is a 69 y.o. male with metastatic pancreatic cancer to the liver. RD met with pt during initial rad onc consult. Nutrition f/u completed today while pt is  here today for his first chemo infusion with Gemzar/Abraxane. Today he endorses decreased abdominal pain after eating r/t more consistent BMs now that he has resumed Amitiza. He reports that his appetite is slightly improved, but he has lost a few more pounds since our last visit. CW: 188#. Noted 3# wt loss over the past 2 weeks. He is supplementing his intake with Ensure Plant-based protein shakes 1-2x/day. He reports that he is working on increasing his fluid intake; he knows that he is not drinking as much as he should. He is inquiring about a plant-based diet; his daughter is trying to convince him that a plant-based diet is best at this time.     Nutrition Intervention:      Nutrition Intervention Energy-modified diet   Goals/Expected Outcomes High-kcal/protein diet to promote wt maintenance.    Progress Progressing towards goal     Nutrition Intervention Medical food supplement: Commercial beverage   Goals/Expected Outcomes Ensure plant-based shakes TID.    Progress Initial     Plan  1. Reinforced importance of adequate nutritional + fluid intake during treatment course.   2. Educated pt on plant-based diet & discussed benefits of this while undergoing treatment- provided printed handout on diet discussed.   3. Ensure plant-based protein shakes TID for supplemental intake.   4. Bowel regimen as prescribed.   5. Pt has RD contact info. Encouraged him to call with any questions/concerns.     Monitoring/Evaluation:     Monitor: wt, p.o. intake    Next Visit: Will f/u at next chemo infusion.       I have explained and the patient understands all of  the current recommendation(s). I have answered all of their questions to the best of my ability and to their complete satisfaction.   The patient is to continue with the current management plan.    Electronically signed by: Renu Crenshaw MS, RDN, LDN

## 2020-10-21 NOTE — PROGRESS NOTES
Mr. Ab Watson is a 69 year old male diagnosed with pancreatic cancer with liver mets.  I initially met with he and his wife during the radiation consult on 10/07/20.  I met with him today to review the [facility] Distress Screening.  Patient stated that he is experiencing some nervousness but is not currently prescribed any medications for anxiety.  I encouraged him to speak with Dr. Alcaraz or his PCP if he wishes to pursue medication interventions.  He denied wanting psychosocial support at this time.  I informed him that I would assist him in locating a counselor that accepts his insurance in the event he wishes to pursue supportive services.  I again provided my contact information in the event he needs supportive services in the future.

## 2020-10-21 NOTE — PLAN OF CARE
Problem: Coping Ineffective (Oncology Care)  Goal: Effective Coping  Outcome: Ongoing, Progressing     Problem: Fatigue (Oncology Care)  Goal: Improved Activity Tolerance  Outcome: Ongoing, Progressing  Intervention: Promote Energy Conservation  Flowsheets (Taken 10/21/2020 1218)  Activity Management:   ambulated - L4   ambulated to bathroom - L4     Problem: Oral Intake Altered (Oncology Care)  Goal: Optimal Oral Intake  Outcome: Ongoing, Progressing     Problem: Oral Mucositis (Oncology Care)  Goal: Improved Oral Mucous Membrane Integrity  Outcome: Ongoing, Progressing

## 2020-10-31 NOTE — PROGRESS NOTES
FOLLOW-UP APPOINTMENT    PATIENT:   Michael Watson  :    1950  MR#:    [id number]  DATE OF VISIT:  10/31/2020      Chief Complaint: Pancreatic Cancer/[employer]    HPI:   Mr. Michael Watson 70 yo male with Metastatic Pancreatic Cancer presents today for chemotherapy education.  He will be starting treatment with Abraxane and Gemzar for the above diagnosis. He knows to have labs drawn every Monday at [place].    Depression Patient Health Questionnaire 10/19/2020 10/14/2020 10/8/2020 2020 1/15/2020 11/15/2019   Over the last two weeks how often have you been bothered by little interest or pleasure in doing things 0 0 0 0 0 0   Over the last two weeks how often have you been bothered by feeling down, depressed or hopeless 0 0 0 0 0 0   PHQ-2 Total Score 0 0 0 0 0 0           Review of Systems   Constitutional: Positive for appetite change. Negative for unexpected weight change.   HENT:   Negative for mouth sores.    Respiratory: Negative for cough and shortness of breath.    Cardiovascular: Negative for chest pain.   Gastrointestinal: Positive for abdominal pain. Negative for diarrhea.   Genitourinary: Negative for frequency.    Musculoskeletal: Positive for back pain.   Skin: Negative for rash.   Neurological: Negative for headaches.   Hematological: Negative for adenopathy.   Psychiatric/Behavioral: The patient is not nervous/anxious.        Oncology History   Primary adenocarcinoma of body of pancreas   2020 Initial Diagnosis    Primary adenocarcinoma of body of pancreas     10/7/2020 Cancer Staged    Staging form: Exocrine Pancreas, AJCC 8th Edition  - Clinical: Stage IV (cT3, cN2, cM1)     10/21/2020 -  Chemotherapy    Treatment Summary   Plan Name: OP PANC NAB-PACLITAXEL + GEMCITABINE Q4W  Treatment Goal: Palliative  Status: Active  Start Date: 10/21/2020  End Date: 2021 (Planned)  Provider: JOON Hendricks MD  Chemotherapy: PACLitaxel-protein bound (ABRAXANE) 125 mg/m2 = 260  mg in 52 mL infusion, 125 mg/m2 = 260 mg, Intravenous, Clinic/HOD 1 time, 1 of 12 cycles  Administration: 260 mg (10/21/2020), 260 mg (10/27/2020)  gemcitabine 2,040 mg in sodium chloride 0.9% 250 mL chemo infusion, 1,000 mg/m2 = 2,040 mg, Intravenous, Clinic/HOD 1 time, 1 of 12 cycles  Administration: 2,040 mg (10/21/2020), 2,040 mg (10/27/2020)     Metastasis to liver   10/14/2020 Initial Diagnosis    Metastasis to liver     10/21/2020 -  Chemotherapy    Treatment Summary   Plan Name: OP PANC NAB-PACLITAXEL + GEMCITABINE Q4W  Treatment Goal: Palliative  Status: Active  Start Date: 10/21/2020  End Date: 9/7/2021 (Planned)  Provider: JOON Hendricks MD  Chemotherapy: PACLitaxel-protein bound (ABRAXANE) 125 mg/m2 = 260 mg in 52 mL infusion, 125 mg/m2 = 260 mg, Intravenous, Clinic/HOD 1 time, 1 of 12 cycles  Administration: 260 mg (10/21/2020), 260 mg (10/27/2020)  gemcitabine 2,040 mg in sodium chloride 0.9% 250 mL chemo infusion, 1,000 mg/m2 = 2,040 mg, Intravenous, Clinic/HOD 1 time, 1 of 12 cycles  Administration: 2,040 mg (10/21/2020), 2,040 mg (10/27/2020)         Patient Active Problem List   Diagnosis    COURTNEY on CPAP    Essential hypertension    Idiopathic peripheral neuropathy    Degeneration of lumbar intervertebral disc    BPH with obstruction/lower urinary tract symptoms    Pure hypercholesterolemia    Cervical disc disease    History of subdural hematoma    Hyperaldosteronism    Gout    Acquired hypothyroidism    Onychomycosis    Primary adenocarcinoma of body of pancreas    Malignant neoplasm of pancreas    Metastasis to liver       Past Medical History:   Diagnosis Date    Abdominal pain     radiates to mid back - stage 4 pancreatic cancer    Brain bleed 2000    Chronically low serum potassium     growth on kidney     Diverticulosis     Hypertension     Left-sided weakness     from previous brain events    Neuropathy     bilateral up to knees    Primary adenocarcinoma of pancreas  2020    Skin cancer     Subdural hematoma 2019    surgery    Thyroid disease        Past Surgical History:   Procedure Laterality Date    CHERISE HOLE FOR SUBDURAL HEMATOMA  2019    [facility].Ms.    CARPAL TUNNEL RELEASE Bilateral     ENDOSCOPIC ULTRASOUND OF UPPER GASTROINTESTINAL TRACT N/A 2020    Procedure: ULTRASOUND, UPPER GI TRACT, ENDOSCOPIC;  Surgeon: Massimo Figueroa III, MD;  Location: [facility] ENDO;  Service: Endoscopy;  Laterality: N/A;    ENDOSCOPIC ULTRASOUND OF UPPER GASTROINTESTINAL TRACT N/A 10/12/2020    Procedure: ULTRASOUND, UPPER GI TRACT, ENDOSCOPIC;  Surgeon: Massimo Figueroa III, MD;  Location: [facility] ENDO;  Service: Endoscopy;  Laterality: N/A;    PENILE PROSTHESIS IMPLANT      Right shoulder repair      UVULECTOMY      removed hyoid bone        Social History     Socioeconomic History    Marital status:      Spouse name: Not on file    Number of children: Not on file    Years of education: Not on file    Highest education level: Not on file   Occupational History    Not on file   Social Needs    Financial resource strain: Not on file    Food insecurity     Worry: Not on file     Inability: Not on file    Transportation needs     Medical: Not on file     Non-medical: Not on file   Tobacco Use    Smoking status: Former Smoker     Quit date:      Years since quittin.8    Smokeless tobacco: Never Used   Substance and Sexual Activity    Alcohol use: Not Currently     Comment: occas    Drug use: Yes     Types: Marijuana     Comment: 1-2 joints per day    Sexual activity: Not on file   Lifestyle    Physical activity     Days per week: Not on file     Minutes per session: Not on file    Stress: Not on file   Relationships    Social connections     Talks on phone: Not on file     Gets together: Not on file     Attends Spiritism service: Not on file     Active member of club or organization: Not on file     Attends meetings of  clubs or organizations: Not on file     Relationship status: Not on file   Other Topics Concern    Not on file   Social History Narrative    Not on file       Family History   Problem Relation Age of Onset    Cancer Mother         lung CA    Heart disease Father     Cancer Paternal Grandmother         lung CA         Current Outpatient Medications:     allopurinoL (ZYLOPRIM) 100 MG tablet, Take 1 tablet (100 mg total) by mouth once daily., Disp: 90 tablet, Rfl: 1    cetirizine (ZYRTEC) 10 MG tablet, Take 10 mg by mouth once daily., Disp: , Rfl:     coenzyme Q10 (CO Q-10) 100 mg capsule, Take 100 mg by mouth once daily., Disp: , Rfl:     cycloSPORINE (RESTASIS) 0.05 % ophthalmic emulsion, Place 1 drop into both eyes 2 (two) times daily., Disp: 30 each, Rfl: 5    DULoxetine (CYMBALTA) 60 MG capsule, Take 1 capsule (60 mg total) by mouth once daily. (Patient taking differently: Take 60 mg by mouth 2 (two) times daily. ), Disp: 90 capsule, Rfl: 1    eplerenone (INSPRA) 50 MG Tab, Take 1 tablet (50 mg total) by mouth 2 (two) times daily., Disp: 180 tablet, Rfl: 1    ezetimibe (ZETIA) 10 mg tablet, Take 1 tablet (10 mg total) by mouth once daily., Disp: 90 tablet, Rfl: 1    famotidine (PEPCID) 20 MG tablet, Take 1 tablet (20 mg total) by mouth 2 (two) times daily., Disp: 180 tablet, Rfl: 1    fentaNYL (DURAGESIC) 25 mcg/hr, Place 1 patch onto the skin every 72 hours., Disp: 10 patch, Rfl: 0    finasteride (PROSCAR) 5 mg tablet, Take 1 tablet (5 mg total) by mouth once daily., Disp: 90 tablet, Rfl: 1    fluticasone propionate (FLONASE) 50 mcg/actuation nasal spray, 1 spray (50 mcg total) by Each Nostril route once daily. (Patient taking differently: 1 spray by Each Nostril route 2 (two) times a day. ), Disp: 3 Bottle, Rfl: 2    hydrALAZINE (APRESOLINE) 50 MG tablet, Take 1 tablet (50 mg total) by mouth 2 (two) times daily., Disp: 180 tablet, Rfl: 1    HYDROcodone-acetaminophen (NORCO)  mg per  tablet, Take 1 tablet by mouth every 6 (six) hours as needed for Pain., Disp: 120 tablet, Rfl: 0    indomethacin (INDOCIN) 25 MG capsule, Take 1 capsule (25 mg total) by mouth 2 (two) times daily as needed., Disp: 60 capsule, Rfl: 1    krill/om3/dha/epa/om6/lip/astx (KRILL OIL, OMEGA 3 AND 6, ORAL), Take by mouth., Disp: , Rfl:     levothyroxine (SYNTHROID) 25 MCG tablet, Take 1 tablet (25 mcg total) by mouth once daily., Disp: 90 tablet, Rfl: 1    lisinopriL-hydrochlorothiazide (ZESTORETIC) 20-25 mg Tab, Take 1 tablet by mouth 2 (two) times a day., Disp: 180 tablet, Rfl: 1    lubiprostone (AMITIZA) 24 MCG Cap, Take 1 capsule (24 mcg total) by mouth 2 (two) times daily with meals., Disp: 60 capsule, Rfl: 3    ondansetron (ZOFRAN-ODT) 8 MG TbDL, Take 1 tablet (8 mg total) by mouth every 8 (eight) hours as needed., Disp: 30 tablet, Rfl: 5    oxyCODONE (ROXICODONE) 5 MG immediate release tablet, Take 1-2 tablet po q 4 hours prn pain, Disp: 120 tablet, Rfl: 0    pantoprazole (PROTONIX) 40 MG tablet, Take 1 tablet (40 mg total) by mouth once daily., Disp: 90 tablet, Rfl: 1    potassium chloride (MICRO-K) 10 MEQ CpSR, Take 1 capsule (10 mEq total) by mouth 2 (two) times daily., Disp: 180 capsule, Rfl: 1    promethazine (PHENERGAN) 25 MG tablet, Take 1 tablet (25 mg total) by mouth every 4 to 6 hours as needed., Disp: 30 tablet, Rfl: 5    sucralfate (CARAFATE) 1 gram tablet, Take 1 tablet (1 g total) by mouth 4 (four) times daily before meals and nightly., Disp: 120 tablet, Rfl: 5    Review of patient's allergies indicates:   Allergen Reactions    Norvasc [amlodipine]     Penicillins     Statins-hmg-coa reductase inhibitors     Sulfa (sulfonamide antibiotics)     Sulfur Other (See Comments)       Physcial Examination  VITAL SIGNS:    Body surface area is 2.01 meters squared.   Pain Assessment  Vitals:    10/27/20 1100   BP: (!) 142/76   Pulse: 88   Resp: 18   Temp: 98.3 °F (36.8 °C)   Weight: 83.4 kg (183  lb 13.8 oz)   PainSc: 0-No pain     Quality:burning and constant  Onset: gradual  Duration: more than 1 year  What relieves the pain? Pain Medication  What cause or increases the pain? Standing, Laying, Walking and Lifting  Plan: Patient is to continue with current pain medications.     Wt Readings from Last 5 Encounters:   10/27/20 83.4 kg (183 lb 13.8 oz)   10/27/20 83.4 kg (183 lb 12.8 oz)   10/21/20 85.4 kg (188 lb 4.8 oz)   10/19/20 85.5 kg (188 lb 8 oz)   10/14/20 84.6 kg (186 lb 8 oz)       GENERAL:  Michael Watson is healthy-appearing 69 y.o. male, in no distress.   EYES:   Pupils equal, round, reactive.  Conjunctivae, sclera and lids normal.  HEENT: Head normocephalic and atraumatic, without alopecia.  Oropharynx is unremarkable.  No icterus, jaundice, stomatitis, mucositis, or ulceration is noted.  Ears are clear and unremarkable.  Nose, nares, and septum are unremarkable.    NECK:   No masses.  Thyroid and trachea are normal.    BREASTS:  Deferred.  RESPIRATORY: Clear to auscultation bilaterally.  Symmetrically effortless expansion.  No wheezing and no stridor.    CV: Heart reveals regular rate and rhythm without murmur, rub, or gallops.  ABDOMEN: Soft, non-tender.  No masses, no hernias, and no rebound or rigidity are noted.  /RECTAL:  Deferred.  LYMPHATICS: No preauricular, submandibular, cervical, supraclavicular, axillary, lymphadenopathy.  MUSCULOSKELETAL:Fair musculature, no atrophy.  No arthritic changes.  No edema or cyanosis. Back is without gross abnormal curvature.   NEUROLOGICAL: Cranial nerves II-XII grossly intact.  Motor and sensory exam intact.  SKIN:   No lesions, bruises, petechiae or rashes.  Good turgor.    PSYCHIATRIC: Patient is alert and oriented to time, place and person.  Mood and affect are appropriate.         Laboratory and Radiology   Lab Results   Component Value Date    WBC 7.81 10/09/2020    RBC 4.29 (L) 10/09/2020    HGB 11.8 (L) 10/09/2020    HCT 37.2 (L)  10/09/2020    MCV 87 10/09/2020    MCH 27.5 10/09/2020    MCHC 31.7 (L) 10/09/2020    RDW 13.2 10/09/2020     10/09/2020    MPV 10.2 10/09/2020    GRAN 5.9 10/09/2020    GRAN 75.4 (H) 10/09/2020    LYMPH 1.1 10/09/2020    LYMPH 14.2 (L) 10/09/2020    MONO 0.6 10/09/2020    MONO 7.2 10/09/2020    EOS 0.1 10/09/2020    BASO 0.08 10/09/2020    EOSINOPHIL 1.4 10/09/2020    BASOPHIL 1.0 10/09/2020     BMP  Lab Results   Component Value Date     10/09/2020    K 3.6 10/09/2020    CL 98 10/09/2020    CO2 30 (H) 10/09/2020    BUN 16 10/09/2020    CREATININE 0.8 10/09/2020    CALCIUM 9.3 10/09/2020    ANIONGAP 9 10/09/2020    ESTGFRAFRICA >60.0 10/09/2020    EGFRNONAA >60.0 10/09/2020     Lab Results   Component Value Date    ALT 55 (H) 09/29/2020    AST 38 (H) 09/29/2020    ALKPHOS 39 07/16/2020    BILITOT 0.3 09/29/2020     Results for orders placed or performed during the hospital encounter of 10/05/20 (from the past 2160 hour(s))   CT Chest Abdomen Pelvis With Contrast    Impression    In this patient with pancreatic adenocarcinoma, there is a 4.3 x 6.3 cm hypodensity within the pancreatic body/tail and scattered soft tissue densities about the peritoneal surfaces of the abdomen and pelvis concerning for peritoneal implants.    1.6 cm left adrenal nodule.  Correlation with outside imaging would be helpful to determine chronicity.  In the setting of malignancy consider further evaluation with CT adrenal mass protocol versus PET-CT as clinically warranted.    Pulmonary nodules in both lungs, the largest measuring 0.6 cm in the right middle lobe.  These nodules can be followed with continued expected oncologic surveillance.    Several hypodensities along the inferior margin of the right hepatic lobe.  Attention on follow-up recommended.    Trace pelvic ascites.    Additional findings detailed above.    This report was flagged in Epic as abnormal.    Electronically signed by resident: Dash Christie  MD  Date:    10/05/2020  Time:    14:39    Electronically signed by: Malcom Lowery MD  Date:    10/05/2020  Time:    15:39           TITLE: PLAN OF CARE FOR THE CHEMOTHERAPY PATIENT / TEACHING PROTOCOL    PURPOSE: To involve the patient / significant other in the plan of care and to provide teaching to the significant other & patient receiving chemotherapy.    LEVEL: Independent.    CONTENT: The Plan of Care for the chemotherapy patient is individualized and appropriate to the patients needs, strengths, limitations, & goals.  Education includes information regarding chemotherapy side effects, the treatment itself, and self-care  Activities.    GOAL / OUTCOME STANDARDS    PHYSIOLOGIC: The client will remain free or experience minimal side effects or toxicities throughout the chemotherapy treatment period.     PSYCHOLOGIC: The client/significant others will demonstrate positive coping mechanisms in relation to chemotherapy and its side effects.      COGINITIVE: The client/significant others will verbalize understanding of self-care measure to avoid/minimize side effects of the chemotherapy regime.    EVALUATION / COMMENT KEY:    V = Audiovisual/Video  S = Successfully meets outcome  N = Needs further instruction  NA = Not applicable to the patient  P = Previous knowledge  U = Unable to comprehend  * = See progress notes          PLAN OF CARE  INFORMATION TO BE DELIVERED / NURSING INTERVENTIONS DATE EVALUATION   1. Assessment of client/caregiver,          knowledge of cancer diagnosis,          and chemotherapy as a treatment.  1a. Evaluate patient/caregiver learning ability     b. Plan teaching sessions with patient/caregiver according to needs and present anxiety level/ability to learn.     c. Provide Chemotherapy Education Packet,         Mouth Care Protocol,          Specific Patient Education Sheets. 10/31/2020  S   2. Individual chemotherapy treatment          plan.  2a. Review of Chemotherapy Education handout  from [employer]              10/31/2020     S   3. Knowledge Deficit & Self-Management of general side effects common to all chemotherapy:  a. Nausea/Vomiting   b.   Diarrhea  c. Mouth Care  d. Dental care  e. Constipation  f. Hair Loss  g. Potential for infection  h. Fatigue   3a. Reinforce that the majority of side effects from chemotherapy are reversible and are  controlled both in the hospital and at home        (blood counts recover, hair grows back).   b.  Refer to the following for reinforcement of         information post-treatment:  1. Mouth Care Protocol.  2. Bowel Protocol for constipation or diarrhea.  3.  Drug Specific Chemotherapy Information Sheets for each medication patient receiving.    10/31/2020     S     PLAN OF CARE  INFORMATION TO BE DELIVERED / NURSING INTERVENTIONS DATE EVALUATION   h. Potential for bleeding         i. Potential anemia/fatigue         j. Potential sunburn         k. Birth control measures  l. Safety measures post treatment 4.  Chemotherapy Home Care Instruction  and Safety Information Sheet.  A. patient/caregivers to thoroughly cook shellfish (shrimp, crab, etc) to decrease the chance of infection.    B.  Use sunscreen and protective clothing while in the sun.   10/31/2020      4. Knowledge deficit & Self Management of Drug Specific  Side Effects.    a. BLADDER EFFECTS         (Hemorrhagic Cystitis)                     Preventable with adequate hydration; occurs 2-3 days or more post treatment.     1.  Instruct patient to:   a.   Void at least every 2 hours; increase intake.   b.   DO NOT hold urine; go when urge is felt.   c.    Empty bladder at bedtime and on          awakening.   d.   Observe for color changes (red to tea            colored), amount and frequency changes.   e.   Notify oncologist of any abnormalities           in urine or voiding or if you cannot               drink adequate fluids.    10/31/2020     S    b.   CHANGES IN URINE        COLOR:         1.    Instruct patient:   a.   Most evident in first 2-3 voidings after            administration.  b. Lasts less than 24 hours.  c. If urine is discolored 2 or more days post- treatment, notify oncologist.      10/31/2020 S   c.    KIDNEY EFFECTS           (Nephrotoxicity)   1.  Instruct patient to:  a.   Drink 8-16 glasses of fluid/day the day   pre-treatment and 3-4 days post-treatment to maintain hydration; the best way to minimize kidney problems.  b.   Notify oncologist immediately if unable to drink fluids or if changes are noted in urinary elimination.      10/31/2020     S   a. PULMONARY TOXICITY    1. Instruct patient to report symptoms such as shortness of breath, chest pain, shallow breathing, or chest wall discomfort to physician.  2. Reinforce preventative measures used by the health care team.  a. Baseline and periodic PFT and chest x-ray.   10/31/2020   S     PLAN OF CARE INFORMATION TO BE DELIVERED / NURSING INTERVENTIONS DATE EVALUATION   b. NERVE & MUSCLE EFFECTS (neurotoxocity; neuropathy, possible visual/hearing changes)        3. Instruct patient to:    a. Report numbness or tingling of the hands/feet, loss of fine motor movement (buttoning shirt, tying shoelaces), or gait changes to your oncologist.  b. If numbness/tingling are present:   protect feet with shoes at all times.   Use gloves for washing dishes/gardening & potholders in kitchen.       10/31/2020   S   c. CARDIOTOXICITY  Decreased effectiveness of             cardiac function. Effective are                  cumulative and irreversible.                                    CARDIAC ARRYTHMIAS              4   Instruct:  a. Heart function may be tested before treatment and perdiocally during treatment.  b. Notify oncologist of irregular pulse, palpitations, shortness of breath, or swelling in lower extremities/feet.         Can cause arrhythmias on infusion that resolve once infusion discontinued. Instruct nurse if any irregularity felt.     10/31/2020   S   d. EXTRAVASTION  Occurs when vesicants leak outside of vein and cause damage to the skin and underlying tissues.   1. Reinforce preventive measures used to avoid complications.  a. Fresh IV site or central line monitored continuously with vesicant IVP.  b. Continuous infusion via central line site and blood return monitored periodically around the clock.  2. Instruct to:  a. Notify nurse of any discomfort, burning, stinging, etc. at IV site during chemotherapy administration.  b. Notify oncologist of any redness, pain, or swelling at IV site after discharge from hospital.   10/31/2020   S   e. HYPERSENSITIVITY can happen with any medication.   1. Instruct patient:  a. Nurse is with them during the initial part of treatment and will be close by to monitor.  b. Pre-medication ordered by the oncologist must be taken on time. If doses are missed, treatment will need to be re-scheduled.  c. Skin redness, itching, or hives appearing after discharge should be reported to oncologist. 10/31/2020   S       PLAN OF CARE INFORMATION TO BE DELIVERED / NURSING INTERVENTIONS DATE EVALUATION   f. FLU-LIKE SYNDROME      1. Instruct patient symptoms are hard to prevent and may include fever, shaking chills, muscle and body aches.  a. Taking prescribed medications from physician if needed.  b. Adequate fluids are important.    2. Reinforce the need to call if temperature is         elevated to 100.4 or more  10/31/2020   S   g. HAND-FOOT SYNDROME  causes painful, symmetric swelling and redness of palms and soles                  5. Instruct patient to report any numbness or tingling in the hands or feet.  6. Explain prevention techniques, such as     a. Use heavy moisturizers to lessen skin dryness and itching, but to avoid if skin is cracked or broken  b. Bathe in tepid water, use non-perfumed soap, and wash gently. Baths with oatmeal or diluted baking soda may be soothing.  c. Avoid tight fitting shoes and  repetitive actions, such as rubbing hands or applying pressure to hands/feet.  7. Review measures to take should syndrome occur:  a. Cold compresses and elevation for          edema  b. Pain medications and other measures as ordered by oncologist.   4.   Syndrome resolves few weeks after therapy. 10/31/2020   S   5. DISCHARGE PLANNING /        EDUCATION 1.    Explain importance of compliance with follow- up  tests (CBC, CMP).  2.    Verify patient/caregiver know:  a.    Oncologists office phone number.  b.    Dates of follow-up appointments.  c.    Prescriptions given for nausea  3.   Review side effects to monitor and notify          oncologist about.  4.   Reinforce the need for patient and caregivers to:  a.    Review information given.  b.    Call oncologists office with questions          or symptoms  5.   Provide [facility] [place] Brochure make referrals if needed for financial or .   10/31/2020   S     PROGRESS NOTES: I met with the patient and his wife today for chemotherapy education. he will be starting treatment with Abraxane and Gemzar . We discussed the mechanism of action, potential side effects of this treatment as well as ways he can manage them at home. Some of these side effects include but or not limited to fever, nausea, vomiting, decreased appetite, fatigue, weakness, cytopenias, myalgia/arthralgia, constipation, diarrhea, bleeding, headache, shortness of breath, nail changes, taste change, hair thinning/loss, mood disturbances, or edema. We also discussed dietary modifications he should make although this will be discussed in more detail with the dietician. he was provided with anti-emetic medication, a copy of all of the information we discussed today as well as our contact information. he will be provided a schedule on his first day of treatment. We will obtain labs on a weekly basis and the patient will follow-up with the physician for toxicity monitoring throughout  treatment. All questions were answered and an informed consent was obtained. he was reminded to certainly contact us sooner if needed.  Attached to the patients folder and discussed with the patient the 24 hour/ 7 days a week after hours telephone number for the physician.  Patient notified to call anytime 24/7 because their is a physician on call for any problems that may arise.  Patient also notified to report to [facility] if they can not get in touch with a physician after hours.  Discussed the five wishes booklet with the patient and their family.           Assessment/Plan   No diagnosis found.   Follow up with Dr. Alcaraz tomorrow as scheduled.    Medications Ordered:  Zofran 8mg 1 tab PO Q8h prn nausea  Phenergan 25mg PO Q4-6h prn nausea      Standing Labs Ordered at [facility] weekly  CMP weekly        Total Face to Face Time: 60 minutes face to face with the patient and their family discussing the chemotherapy side-effects and when to call our office.   Electronically signed by: Liz Stern, MSN, APRN, AGNP-C, OCN      Answers for HPI/ROS submitted by the patient on 10/17/2020   visual disturbance: No

## 2020-10-31 NOTE — PROGRESS NOTES
[facility] hematology oncology in office encounter progress note  October 27, 2020    Chief complaint is pancreatic cancer      He has pancreatic cancer with liver metastases.   I reviewed the Gemzar and Abraxane chemotherapy regimen given weekly on days 01, 08, 15 of every 28 day cycle.  This could be given per his peripheral veins and would not require the PET placement of a mickie catheter or  midline.    Discuss premeds, following a blood counts weekly, possible need for Neupogen or Neulasta., watching him for possible peripheral neuropathy development while on chemotherapy.    He is having considerable pain on Norco  1 p.o. q.4-6 hours p.r.n.  Discussed keeping the Norco at 1 p.o. q.6 hours  Discussed the addition of oxycodone 5 mg 1 or 2 p.o. q.4 hours p.r.n. breakthrough pain.  Discussed the addition of Duragesic patch 25 mcg Q 72 hours, used as a pain preventive.    Today he gets day 8 course 1 of his Gemzar Abraxane on October 27, 2020.  He did have some hand and ankle and joint pain after day 1 course 1.  He denies increased peripheral neuropathy symptoms.  He had increased frequency of a normal stool for 3 or 4 days and is now back to his routine.    Baseline CA 19 9 was 10,447.  This will be monitored while on chemotherapy, in assessing his response.    Return to clinic in 1 week for day 15 course 1.  CBC and CMP were reviewed and are stable.

## 2020-11-03 PROBLEM — D70.9 NEUTROPENIA: Status: ACTIVE | Noted: 2020-01-01

## 2020-11-03 NOTE — TELEPHONE ENCOUNTER
Patient notified that no authorization is needed for his injection and I will do my best to give him the times he needs for the week. He voiced understanding.

## 2020-11-03 NOTE — TELEPHONE ENCOUNTER
Patient's . Spoke with Dr. Hendricks and order received to hold chemo today and start Neupogen 480mcg daily x 4 days starting today. Patient notified and will call him back when auth obtained if needed.

## 2020-11-05 NOTE — PLAN OF CARE
Problem: Neutropenia  Goal: Absence of Infection  Outcome: Ongoing, Progressing  Intervention: Prevent Infection and Maximize Resistance  Flowsheets (Taken 11/5/2020 1443)  Infection Prevention:   personal protective equipment utilized   rest/sleep promoted   single patient room provided   visitors restricted/screened

## 2020-11-06 NOTE — PLAN OF CARE
Problem: Neutropenia  Goal: Absence of Infection  Outcome: Ongoing, Progressing  Intervention: Prevent Infection and Maximize Resistance  Flowsheets (Taken 11/6/2020 1059)  Infection Prevention:   personal protective equipment utilized   rest/sleep promoted   single patient room provided   visitors restricted/screened

## 2020-11-09 NOTE — PROGRESS NOTES
SUBJECTIVE:    Patient ID: Michael Watson is a 69 y.o. male.    Chief Complaint: Follow-up (did not bring bottles, unsure if he needs refills // Colonoscopy - Refused, had one 5 years ago in [facility])    This 69-year-old male is been diagnosed with pancreatic mass with Mets to the liver.  He is currently under the care Dr. Andrew Hendricks and is in schedule for 3 rounds of chemotherapy.  He has already done 1 or 2 rounds and found no nausea with the chemo but does have a complaints of abdominal cramping.  He also has bladder spasms and some urgency.  He gets his outpatient infusions at the cancer center.  He takes oxycodone 5 mg-325 p.r.n. appetite remains good.  Complains of some back spasms and had no relief with Flexeril.  He is trying to stay hydrated and drink plenty of fluids.  He has lost 24 lb since he was diagnosed with this cancer.      Appointment on 10/19/2020   Component Date Value Ref Range Status    SARS-CoV2 (COVID-19) Qualitative P* 10/19/2020 Not Detected  Not Detected Final   Hospital Outpatient Visit on 10/09/2020   Component Date Value Ref Range Status    SARS-CoV2 (COVID-19) Qualitative P* 10/09/2020 Not Detected  Not Detected Final   Lab Visit on 10/09/2020   Component Date Value Ref Range Status    WBC 10/09/2020 7.81  3.90 - 12.70 K/uL Final    RBC 10/09/2020 4.29* 4.60 - 6.20 M/uL Final    Hemoglobin 10/09/2020 11.8* 14.0 - 18.0 g/dL Final    Hematocrit 10/09/2020 37.2* 40.0 - 54.0 % Final    MCV 10/09/2020 87  82 - 98 fL Final    MCH 10/09/2020 27.5  27.0 - 31.0 pg Final    MCHC 10/09/2020 31.7* 32.0 - 36.0 g/dL Final    RDW 10/09/2020 13.2  11.5 - 14.5 % Final    Platelets 10/09/2020 274  150 - 350 K/uL Final    MPV 10/09/2020 10.2  9.2 - 12.9 fL Final    Immature Granulocytes 10/09/2020 0.8* 0.0 - 0.5 % Final    Gran # (ANC) 10/09/2020 5.9  1.8 - 7.7 K/uL Final    Immature Grans (Abs) 10/09/2020 0.06* 0.00 - 0.04 K/uL Final    Lymph # 10/09/2020 1.1  1.0 - 4.8 K/uL  Final    Mono # 10/09/2020 0.6  0.3 - 1.0 K/uL Final    Eos # 10/09/2020 0.1  0.0 - 0.5 K/uL Final    Baso # 10/09/2020 0.08  0.00 - 0.20 K/uL Final    nRBC 10/09/2020 0  0 /100 WBC Final    Gran % 10/09/2020 75.4* 38.0 - 73.0 % Final    Lymph % 10/09/2020 14.2* 18.0 - 48.0 % Final    Mono % 10/09/2020 7.2  4.0 - 15.0 % Final    Eosinophil % 10/09/2020 1.4  0.0 - 8.0 % Final    Basophil % 10/09/2020 1.0  0.0 - 1.9 % Final    Differential Method 10/09/2020 Automated   Final    Sodium 10/09/2020 137  136 - 145 mmol/L Final    Potassium 10/09/2020 3.6  3.5 - 5.1 mmol/L Final    Chloride 10/09/2020 98  95 - 110 mmol/L Final    CO2 10/09/2020 30* 23 - 29 mmol/L Final    Glucose 10/09/2020 122* 70 - 110 mg/dL Final    BUN 10/09/2020 16  8 - 23 mg/dL Final    Creatinine 10/09/2020 0.8  0.5 - 1.4 mg/dL Final    Calcium 10/09/2020 9.3  8.7 - 10.5 mg/dL Final    Anion Gap 10/09/2020 9  8 - 16 mmol/L Final    eGFR if African American 10/09/2020 >60.0  >60 mL/min/1.73 m^2 Final    eGFR if non African American 10/09/2020 >60.0  >60 mL/min/1.73 m^2 Final   Office Visit on 09/29/2020   Component Date Value Ref Range Status    WBC 09/29/2020 8.0  3.8 - 10.8 Thousand/uL Final    RBC 09/29/2020 4.51  4.20 - 5.80 Million/uL Final    Hemoglobin 09/29/2020 12.5* 13.2 - 17.1 g/dL Final    Hematocrit 09/29/2020 37.6* 38.5 - 50.0 % Final    MCV 09/29/2020 83.4  80.0 - 100.0 fL Final    MCH 09/29/2020 27.7  27.0 - 33.0 pg Final    MCHC 09/29/2020 33.2  32.0 - 36.0 g/dL Final    RDW 09/29/2020 12.5  11.0 - 15.0 % Final    Platelets 09/29/2020 364  140 - 400 Thousand/uL Final    MPV 09/29/2020 9.6  7.5 - 12.5 fL Final    Neutrophils Absolute 09/29/2020 5,944  1,500 - 7,800 cells/uL Final    Lymph # 09/29/2020 1,304  850 - 3,900 cells/uL Final    Mono # 09/29/2020 544  200 - 950 cells/uL Final    Eos # 09/29/2020 128  15 - 500 cells/uL Final    Baso # 09/29/2020 80  0 - 200 cells/uL Final    Neutrophils  Relative 09/29/2020 74.3  % Final    Lymph % 09/29/2020 16.3  % Final    Mono % 09/29/2020 6.8  % Final    Eosinophil % 09/29/2020 1.6  % Final    Basophil % 09/29/2020 1.0  % Final    Glucose 09/29/2020 127  65 - 139 mg/dL Final    BUN 09/29/2020 12  7 - 25 mg/dL Final    Creatinine 09/29/2020 0.84  0.70 - 1.18 mg/dL Final    eGFR if non African American 09/29/2020 88  > OR = 60 mL/min/1.73m2 Final    eGFR if African American 09/29/2020 102  > OR = 60 mL/min/1.73m2 Final    BUN/Creatinine Ratio 09/29/2020 NOT APPLICABLE  6 - 22 (calc) Final    Sodium 09/29/2020 141  135 - 146 mmol/L Final    Potassium 09/29/2020 3.8  3.5 - 5.3 mmol/L Final    Chloride 09/29/2020 101  98 - 110 mmol/L Final    CO2 09/29/2020 31  20 - 32 mmol/L Final    Calcium 09/29/2020 9.2  8.6 - 10.3 mg/dL Final    Total Protein 09/29/2020 6.8  6.1 - 8.1 g/dL Final    Albumin 09/29/2020 3.7  3.6 - 5.1 g/dL Final    Globulin, Total 09/29/2020 3.1  1.9 - 3.7 g/dL (calc) Final    Albumin/Globulin Ratio 09/29/2020 1.2  1.0 - 2.5 (calc) Final    Total Bilirubin 09/29/2020 0.3  0.2 - 1.2 mg/dL Final    Alkaline Phosphatase 09/29/2020 62  35 - 144 U/L Final    AST 09/29/2020 38* 10 - 35 U/L Final    ALT 09/29/2020 55* 9 - 46 U/L Final    CA 19-9 09/29/2020 10,447* <34 U/mL Final    Copy Received From 09/29/2020    Final   Hospital Outpatient Visit on 09/16/2020   Component Date Value Ref Range Status    SARS-CoV2 (COVID-19) Qualitative P* 09/16/2020 Not Detected  Not Detected Final       Past Medical History:   Diagnosis Date    Abdominal pain     radiates to mid back - stage 4 pancreatic cancer    Brain bleed 2000    Chronically low serum potassium     growth on kidney     Diverticulosis     Hypertension     Left-sided weakness     from previous brain events    Neuropathy     bilateral up to knees    Primary adenocarcinoma of pancreas 09/2020    Skin cancer     Subdural hematoma 2019    surgery    Thyroid disease       Social History     Socioeconomic History    Marital status:      Spouse name: Not on file    Number of children: Not on file    Years of education: Not on file    Highest education level: Not on file   Occupational History    Not on file   Social Needs    Financial resource strain: Not on file    Food insecurity     Worry: Not on file     Inability: Not on file    Transportation needs     Medical: Not on file     Non-medical: Not on file   Tobacco Use    Smoking status: Former Smoker     Quit date:      Years since quittin.8    Smokeless tobacco: Never Used   Substance and Sexual Activity    Alcohol use: Not Currently     Comment: occas    Drug use: Yes     Types: Marijuana     Comment: 1-2 joints per day    Sexual activity: Not on file   Lifestyle    Physical activity     Days per week: Not on file     Minutes per session: Not on file    Stress: Not on file   Relationships    Social connections     Talks on phone: Not on file     Gets together: Not on file     Attends Quaker service: Not on file     Active member of club or organization: Not on file     Attends meetings of clubs or organizations: Not on file     Relationship status: Not on file   Other Topics Concern    Not on file   Social History Narrative    Not on file     Past Surgical History:   Procedure Laterality Date    CHERISE HOLE FOR SUBDURAL HEMATOMA      [facility].Ms.    CARPAL TUNNEL RELEASE Bilateral     ENDOSCOPIC ULTRASOUND OF UPPER GASTROINTESTINAL TRACT N/A 2020    Procedure: ULTRASOUND, UPPER GI TRACT, ENDOSCOPIC;  Surgeon: Massimo Figueroa III, MD;  Location: [facility] ENDO;  Service: Endoscopy;  Laterality: N/A;    ENDOSCOPIC ULTRASOUND OF UPPER GASTROINTESTINAL TRACT N/A 10/12/2020    Procedure: ULTRASOUND, UPPER GI TRACT, ENDOSCOPIC;  Surgeon: Massimo Figueroa III, MD;  Location: [facility] ENDO;  Service: Endoscopy;  Laterality: N/A;    PENILE PROSTHESIS IMPLANT       Right shoulder repair      UVULECTOMY      removed hyoid bone      Family History   Problem Relation Age of Onset    Cancer Mother         lung CA    Heart disease Father     Cancer Paternal Grandmother         lung CA       Review of patient's allergies indicates:   Allergen Reactions    Norvasc [amlodipine]     Penicillins     Statins-hmg-coa reductase inhibitors     Sulfa (sulfonamide antibiotics)     Sulfur Other (See Comments)       Current Outpatient Medications:     allopurinoL (ZYLOPRIM) 100 MG tablet, Take 1 tablet (100 mg total) by mouth once daily., Disp: 90 tablet, Rfl: 1    cetirizine (ZYRTEC) 10 MG tablet, Take 10 mg by mouth once daily., Disp: , Rfl:     coenzyme Q10 (CO Q-10) 100 mg capsule, Take 100 mg by mouth once daily., Disp: , Rfl:     cycloSPORINE (RESTASIS) 0.05 % ophthalmic emulsion, Place 1 drop into both eyes 2 (two) times daily., Disp: 30 each, Rfl: 5    diazePAM (VALIUM) 5 MG tablet, Take 1 tablet (5 mg total) by mouth 2 (two) times a day. P.r.n. back spasms, Disp: 50 tablet, Rfl: 0    DULoxetine (CYMBALTA) 60 MG capsule, Take 1 capsule (60 mg total) by mouth once daily., Disp: 90 capsule, Rfl: 1    eplerenone (INSPRA) 50 MG Tab, Take 1 tablet (50 mg total) by mouth 2 (two) times daily., Disp: 180 tablet, Rfl: 1    ezetimibe (ZETIA) 10 mg tablet, Take 1 tablet (10 mg total) by mouth once daily., Disp: 90 tablet, Rfl: 1    famotidine (PEPCID) 20 MG tablet, Take 1 tablet (20 mg total) by mouth 2 (two) times daily., Disp: 180 tablet, Rfl: 1    fentaNYL (DURAGESIC) 25 mcg/hr, Place 1 patch onto the skin every 72 hours., Disp: 10 patch, Rfl: 0    finasteride (PROSCAR) 5 mg tablet, Take 1 tablet (5 mg total) by mouth once daily., Disp: 90 tablet, Rfl: 1    fluticasone propionate (FLONASE) 50 mcg/actuation nasal spray, 1 spray (50 mcg total) by Each Nostril route 2 (two) times a day., Disp: 48 g, Rfl: 3    hydrALAZINE (APRESOLINE) 50 MG tablet, Take 1 tablet (50 mg total)  by mouth 2 (two) times daily., Disp: 180 tablet, Rfl: 1    HYDROcodone-acetaminophen (NORCO)  mg per tablet, Take 1 tablet by mouth every 6 (six) hours as needed for Pain., Disp: 120 tablet, Rfl: 0    indomethacin (INDOCIN) 25 MG capsule, Take 1 capsule (25 mg total) by mouth 2 (two) times daily as needed., Disp: 60 capsule, Rfl: 1    krill/om3/dha/epa/om6/lip/astx (KRILL OIL, OMEGA 3 AND 6, ORAL), Take by mouth., Disp: , Rfl:     levothyroxine (SYNTHROID) 25 MCG tablet, Take 1 tablet (25 mcg total) by mouth once daily., Disp: 90 tablet, Rfl: 1    lisinopriL-hydrochlorothiazide (ZESTORETIC) 20-25 mg Tab, Take 1 tablet by mouth 2 (two) times a day., Disp: 180 tablet, Rfl: 1    lubiprostone (AMITIZA) 24 MCG Cap, Take 1 capsule (24 mcg total) by mouth 2 (two) times daily with meals., Disp: 60 capsule, Rfl: 3    ondansetron (ZOFRAN-ODT) 8 MG TbDL, Take 1 tablet (8 mg total) by mouth every 8 (eight) hours as needed., Disp: 30 tablet, Rfl: 5    oxyCODONE (ROXICODONE) 5 MG immediate release tablet, Take 1-2 tablet po q 4 hours prn pain, Disp: 120 tablet, Rfl: 0    pantoprazole (PROTONIX) 40 MG tablet, Take 1 tablet (40 mg total) by mouth once daily., Disp: 90 tablet, Rfl: 1    potassium chloride (MICRO-K) 10 MEQ CpSR, Take 1 capsule (10 mEq total) by mouth 2 (two) times daily., Disp: 180 capsule, Rfl: 1    promethazine (PHENERGAN) 25 MG tablet, Take 1 tablet (25 mg total) by mouth every 4 to 6 hours as needed., Disp: 30 tablet, Rfl: 5    sucralfate (CARAFATE) 1 gram tablet, Take 1 tablet (1 g total) by mouth 4 (four) times daily before meals and nightly., Disp: 120 tablet, Rfl: 5    Review of Systems   Constitutional: Negative for appetite change (lost  24 lbs  but appetite improving), chills, fatigue, fever and unexpected weight change.   HENT: Negative for congestion, ear pain and trouble swallowing.    Eyes: Negative for pain, discharge and visual disturbance.   Respiratory: Negative for apnea, cough,  "shortness of breath and wheezing.         Diagnosed with COURTNEY and uses CPAP at night.   Cardiovascular: Negative for chest pain and leg swelling.   Gastrointestinal: Negative for abdominal pain, blood in stool, constipation, diarrhea, nausea and vomiting.   Endocrine: Negative for cold intolerance, heat intolerance and polydipsia.   Genitourinary: Positive for urgency. Negative for dysuria, hematuria and testicular pain.        Nocturia 1-2 x  night   Musculoskeletal: Positive for back pain (back spasms). Negative for gait problem, joint swelling and myalgias.   Neurological: Negative for dizziness, seizures and numbness.   Psychiatric/Behavioral: Negative for agitation, behavioral problems and hallucinations. The patient is not nervous/anxious.           Objective:      Vitals:    11/09/20 1129 11/09/20 1131   BP: (!) 146/80 (!) 148/84   Pulse: 84    Temp: 98.3 °F (36.8 °C)    Weight: 86.2 kg (190 lb)    Height: 5' 9" (1.753 m)      Physical Exam  Vitals signs and nursing note reviewed.   Constitutional:       Appearance: He is well-developed.   HENT:      Head: Normocephalic and atraumatic.      Right Ear: External ear normal.      Left Ear: External ear normal.      Nose: Nose normal.   Eyes:      Pupils: Pupils are equal, round, and reactive to light.   Neck:      Musculoskeletal: Normal range of motion and neck supple.      Thyroid: No thyromegaly.      Vascular: No carotid bruit.   Cardiovascular:      Rate and Rhythm: Normal rate and regular rhythm.      Heart sounds: Normal heart sounds. No murmur.   Pulmonary:      Effort: Pulmonary effort is normal.      Breath sounds: Normal breath sounds. No wheezing or rales.   Abdominal:      General: Bowel sounds are normal. There is no distension.      Palpations: Abdomen is soft.      Tenderness: There is no abdominal tenderness.   Musculoskeletal: Normal range of motion.         General: No tenderness or deformity.      Lumbar back: Normal. He exhibits no pain and " no spasm.      Comments: Bends 90 degrees at  Waist, shoulders and knees have full range of motion. No  Pitting edema to lower extremities   Lymphadenopathy:      Cervical: No cervical adenopathy.   Skin:     General: Skin is warm and dry.      Findings: No rash.   Neurological:      Mental Status: He is alert and oriented to person, place, and time.      Cranial Nerves: No cranial nerve deficit.      Coordination: Coordination normal.   Psychiatric:         Behavior: Behavior normal.         Thought Content: Thought content normal.         Judgment: Judgment normal.           Assessment:       1. Primary adenocarcinoma of body of pancreas    2. Degeneration of lumbar intervertebral disc    3. Essential hypertension    4. Non-seasonal allergic rhinitis due to pollen    5. Metastasis to liver    6. Malignant neoplasm of tail of pancreas    7. Acquired hypothyroidism    8. Idiopathic chronic gout of multiple sites without tophus    9. COURTNEY on CPAP    10. BPH with obstruction/lower urinary tract symptoms    11. Pure hypercholesterolemia    12. Idiopathic peripheral neuropathy    13. Muscle spasms of both lower extremities         Plan:       Primary adenocarcinoma of body of pancreas  Continue chemotherapy rounds with Dr. Hendricks  Degeneration of lumbar intervertebral disc  -     DULoxetine (CYMBALTA) 60 MG capsule; Take 1 capsule (60 mg total) by mouth once daily.  Dispense: 90 capsule; Refill: 1  add Valium 5 mg p.o. b.i.d. for back spasms and bladder spasms  Essential hypertension  -     potassium chloride (MICRO-K) 10 MEQ CpSR; Take 1 capsule (10 mEq total) by mouth 2 (two) times daily.  Dispense: 180 capsule; Refill: 1    Non-seasonal allergic rhinitis due to pollen  -     fluticasone propionate (FLONASE) 50 mcg/actuation nasal spray; 1 spray (50 mcg total) by Each Nostril route 2 (two) times a day.  Dispense: 48 g; Refill: 3    Metastasis to liver  Currently getting chemotherapy  Malignant neoplasm of tail of  pancreas  WBC 11 hemoglobin 9.6 hematocrit 29.5  Acquired hypothyroidism    Idiopathic chronic gout of multiple sites without tophus    COURTNEY on CPAP  Continue CPAP at night  BPH with obstruction/lower urinary tract symptoms    Pure hypercholesterolemia    Idiopathic peripheral neuropathy    Muscle spasms of both lower extremities  -     diazePAM (VALIUM) 5 MG tablet; Take 1 tablet (5 mg total) by mouth 2 (two) times a day. P.r.n. back spasms  Dispense: 50 tablet; Refill: 0      Follow up in about 3 months (around 2/9/2021), or pancreatic  cancer.        11/9/2020 Peter Mendoza

## 2020-11-09 NOTE — TELEPHONE ENCOUNTER
----- Message from Kerri Lee sent at 11/9/2020 12:40 PM CST -----  Pt needs a 3 month follow up with Dr. Mendoza and needs permission for his wife to come to visit with him. Please advise. Pt #[phone], home #[phone]

## 2020-11-10 NOTE — PLAN OF CARE
Problem: Fatigue  Goal: Improved Activity Tolerance  Outcome: Ongoing, Progressing  Intervention: Promote Energy Conservation  Flowsheets (Taken 11/10/2020 1141)  Fatigue Management: frequent rest breaks encouraged  Sleep/Rest Enhancement:   noise level reduced   awakenings minimized   regular sleep/rest pattern promoted  Activity Management: ambulated - L4

## 2020-11-10 NOTE — PROGRESS NOTES
Medical Nutrition Therapy Oncology Progress Note      Patient's PCP:Peter Mendoza MD  Referring Provider: No ref. provider found    Recommendations/Interventions     RD Notes  Nutrition f/u today while pt received his chemo infusion with Gemzar/Abraxane for pancreatic cancer. Pt endorses improved appetite, especially 5-6 days following chemo. He is eating small, frequent meals/snacks q3-4 hrs as recommended. Supplementing his intake with at least 2 (sometimes 3) plant-based Ensure shakes/day. He has maintained his wt for the past 3 weeks now @ 188#. He endorses normal BMs every other day. He denies any new nutrition-related concerns today.     Plan  1. Reinforced importance of adequate nutritional & fluid intake during treatment course.   2. Continue small, frequent meals q3-4 hrs as tolerated.   3. Ensure plant-based shakes TID.   4. Pleased with wt maintenance thus far.   5. Pt has RD contact info. Encouraged him to call with any questions/concerns.     Subjective:        Patient ID: Michael Watson is a 69 y.o. male.    Chief Complaint: nutrition f/u    Past Medical History:   Diagnosis Date    Abdominal pain     radiates to mid back - stage 4 pancreatic cancer    Brain bleed 2000    Chronically low serum potassium     growth on kidney     Diverticulosis     Hypertension     Left-sided weakness     from previous brain events    Neuropathy     bilateral up to knees    Primary adenocarcinoma of pancreas 09/2020    Skin cancer     Subdural hematoma 2019    surgery    Thyroid disease        Past Surgical History:   Procedure Laterality Date    CHERISE HOLE FOR SUBDURAL HEMATOMA  2019    [facility].Ms.    CARPAL TUNNEL RELEASE Bilateral     ENDOSCOPIC ULTRASOUND OF UPPER GASTROINTESTINAL TRACT N/A 9/17/2020    Procedure: ULTRASOUND, UPPER GI TRACT, ENDOSCOPIC;  Surgeon: Massimo Figueroa III, MD;  Location: [facility];  Service: Endoscopy;   Laterality: N/A;    ENDOSCOPIC ULTRASOUND OF UPPER GASTROINTESTINAL TRACT N/A 10/12/2020    Procedure: ULTRASOUND, UPPER GI TRACT, ENDOSCOPIC;  Surgeon: Massimo Figueroa III, MD;  Location: [facility];  Service: Endoscopy;  Laterality: N/A;    PENILE PROSTHESIS IMPLANT      Right shoulder repair      UVULECTOMY      removed hyoid bone        Social History     Socioeconomic History    Marital status:      Spouse name: Not on file    Number of children: Not on file    Years of education: Not on file    Highest education level: Not on file   Occupational History    Not on file   Social Needs    Financial resource strain: Not on file    Food insecurity     Worry: Not on file     Inability: Not on file    Transportation needs     Medical: Not on file     Non-medical: Not on file   Tobacco Use    Smoking status: Former Smoker     Quit date:      Years since quittin.8    Smokeless tobacco: Never Used   Substance and Sexual Activity    Alcohol use: Not Currently     Comment: occas    Drug use: Yes     Types: Marijuana     Comment: 1-2 joints per day    Sexual activity: Not on file   Lifestyle    Physical activity     Days per week: Not on file     Minutes per session: Not on file    Stress: Not on file   Relationships    Social connections     Talks on phone: Not on file     Gets together: Not on file     Attends Nondenominational service: Not on file     Active member of club or organization: Not on file     Attends meetings of clubs or organizations: Not on file     Relationship status: Not on file   Other Topics Concern    Not on file   Social History Narrative    Not on file       Family History   Problem Relation Age of Onset    Cancer Mother         lung CA    Heart disease Father     Cancer Paternal Grandmother         lung CA       Review of patient's allergies indicates:   Allergen Reactions    Norvasc [amlodipine]     Penicillins     Statins-hmg-coa reductase inhibitors      Sulfa (sulfonamide antibiotics)     Sulfur Other (See Comments)       Current Outpatient Medications:     allopurinoL (ZYLOPRIM) 100 MG tablet, Take 1 tablet (100 mg total) by mouth once daily., Disp: 90 tablet, Rfl: 1    cetirizine (ZYRTEC) 10 MG tablet, Take 10 mg by mouth once daily., Disp: , Rfl:     coenzyme Q10 (CO Q-10) 100 mg capsule, Take 100 mg by mouth once daily., Disp: , Rfl:     cycloSPORINE (RESTASIS) 0.05 % ophthalmic emulsion, Place 1 drop into both eyes 2 (two) times daily., Disp: 30 each, Rfl: 5    diazePAM (VALIUM) 5 MG tablet, Take 1 tablet (5 mg total) by mouth 2 (two) times a day. P.r.n. back spasms, Disp: 50 tablet, Rfl: 0    DULoxetine (CYMBALTA) 60 MG capsule, Take 1 capsule (60 mg total) by mouth once daily., Disp: 90 capsule, Rfl: 1    eplerenone (INSPRA) 50 MG Tab, Take 1 tablet (50 mg total) by mouth 2 (two) times daily., Disp: 180 tablet, Rfl: 1    ezetimibe (ZETIA) 10 mg tablet, Take 1 tablet (10 mg total) by mouth once daily., Disp: 90 tablet, Rfl: 1    famotidine (PEPCID) 20 MG tablet, Take 1 tablet (20 mg total) by mouth 2 (two) times daily., Disp: 180 tablet, Rfl: 1    fentaNYL (DURAGESIC) 25 mcg/hr, Place 1 patch onto the skin every 72 hours., Disp: 10 patch, Rfl: 0    finasteride (PROSCAR) 5 mg tablet, Take 1 tablet (5 mg total) by mouth once daily., Disp: 90 tablet, Rfl: 1    fluticasone propionate (FLONASE) 50 mcg/actuation nasal spray, 1 spray (50 mcg total) by Each Nostril route 2 (two) times a day., Disp: 48 g, Rfl: 3    hydrALAZINE (APRESOLINE) 50 MG tablet, Take 1 tablet (50 mg total) by mouth 2 (two) times daily., Disp: 180 tablet, Rfl: 1    HYDROcodone-acetaminophen (NORCO)  mg per tablet, Take 1 tablet by mouth every 6 (six) hours as needed for Pain., Disp: 120 tablet, Rfl: 0    indomethacin (INDOCIN) 25 MG capsule, Take 1 capsule (25 mg total) by mouth 2 (two) times daily as needed., Disp: 60 capsule, Rfl: 1    krill/om3/dha/epa/om6/lip/astx  (KRILL OIL, OMEGA 3 AND 6, ORAL), Take by mouth., Disp: , Rfl:     levothyroxine (SYNTHROID) 25 MCG tablet, Take 1 tablet (25 mcg total) by mouth once daily., Disp: 90 tablet, Rfl: 1    lisinopriL-hydrochlorothiazide (ZESTORETIC) 20-25 mg Tab, Take 1 tablet by mouth 2 (two) times a day., Disp: 180 tablet, Rfl: 1    lubiprostone (AMITIZA) 24 MCG Cap, Take 1 capsule (24 mcg total) by mouth 2 (two) times daily with meals., Disp: 60 capsule, Rfl: 3    ondansetron (ZOFRAN-ODT) 8 MG TbDL, Take 1 tablet (8 mg total) by mouth every 8 (eight) hours as needed., Disp: 30 tablet, Rfl: 5    oxyCODONE (ROXICODONE) 5 MG immediate release tablet, Take 1-2 tablet po q 4 hours prn pain, Disp: 120 tablet, Rfl: 0    pantoprazole (PROTONIX) 40 MG tablet, Take 1 tablet (40 mg total) by mouth once daily., Disp: 90 tablet, Rfl: 1    potassium chloride (MICRO-K) 10 MEQ CpSR, Take 1 capsule (10 mEq total) by mouth 2 (two) times daily., Disp: 180 capsule, Rfl: 1    promethazine (PHENERGAN) 25 MG tablet, Take 1 tablet (25 mg total) by mouth every 4 to 6 hours as needed., Disp: 30 tablet, Rfl: 5    sucralfate (CARAFATE) 1 gram tablet, Take 1 tablet (1 g total) by mouth 4 (four) times daily before meals and nightly., Disp: 120 tablet, Rfl: 5  No current facility-administered medications for this visit.     Facility-Administered Medications Ordered in Other Visits:     alteplase injection 2 mg, 2 mg, Intra-Catheter, PRN, JOON Hendircks MD    gemcitabine 2,010 mg in sodium chloride 0.9% 250 mL chemo infusion, 1,000 mg/m2, Intravenous, 1 time in Clinic/HOD, JOON Hendricks MD, Last Rate: 302.9 mL/hr at 11/10/20 1404, 2,010 mg at 11/10/20 1404    heparin, porcine (PF) 100 unit/mL injection flush 500 Units, 500 Units, Intravenous, PRN, JOON Hendricks MD    sodium chloride 0.9% 250 mL flush bag, , Intravenous, 1 time in Clinic/HOD, JOON Hendricks MD    sodium chloride 0.9% flush 10 mL, 10 mL, Intravenous, PRN, JOON Hendricks,  MD    All medications and past history have been reviewed.    OP PANC NAB-PACLITAXEL + GEMCITABINE Q4W      Treatment Goal:   Palliative      Status:   Active      Start Date:   10/21/2020      End Date:   9/14/2021 (Planned)      Provider:   JOON Hendricks MD      Chemotherapy:   PACLitaxel-protein bound (ABRAXANE) 125 mg/m2 = 260 mg in 52 mL infusion, 125 mg/m2 = 260 mg, Intravenous, Clinic/HOD 1 time, 1 of 12 cycles    Administration: 260 mg (10/21/2020), 260 mg (10/27/2020), 250 mg (11/10/2020)        gemcitabine 2,040 mg in sodium chloride 0.9% 250 mL chemo infusion, 1,000 mg/m2 = 2,040 mg, Intravenous, Clinic/HOD 1 time, 1 of 12 cycles    Administration: 2,040 mg (10/21/2020), 2,040 mg (10/27/2020), 2,010 mg (11/10/2020)      Objective:        Wt Readings from Last 1 Encounters:   11/10/20 1100 85.5 kg (188 lb 8 oz)       Last Labs:  Glucose   Date Value Ref Range Status   10/09/2020 122 (H) 70 - 110 mg/dL Final   09/29/2020 127 65 - 139 mg/dL Final     Comment:               Non-fasting reference interval          BUN   Date Value Ref Range Status   10/09/2020 16 8 - 23 mg/dL Final   09/29/2020 12 7 - 25 mg/dL Final     Creatinine   Date Value Ref Range Status   10/09/2020 0.8 0.5 - 1.4 mg/dL Final   09/29/2020 0.84 0.70 - 1.18 mg/dL Final     Comment:     For patients >49 years of age, the reference limit  for Creatinine is approximately 13% higher for people  identified as -American.          Sodium   Date Value Ref Range Status   10/09/2020 137 136 - 145 mmol/L Final   09/29/2020 141 135 - 146 mmol/L Final     Potassium   Date Value Ref Range Status   10/09/2020 3.6 3.5 - 5.1 mmol/L Final   09/29/2020 3.8 3.5 - 5.3 mmol/L Final     No results found for: PHOS  Calcium   Date Value Ref Range Status   10/09/2020 9.3 8.7 - 10.5 mg/dL Final   09/29/2020 9.2 8.6 - 10.3 mg/dL Final     No results found for: PREALBUMIN  Total Protein   Date Value Ref Range Status   09/29/2020 6.8 6.1 - 8.1 g/dL Final    07/16/2020 7.2 6.1 - 8.1 g/dL Final     Cholesterol   Date Value Ref Range Status   07/16/2020 190 <200 mg/dL Final     No results found for: HGBA1C  Hemoglobin   Date Value Ref Range Status   10/09/2020 11.8 (L) 14.0 - 18.0 g/dL Final   09/29/2020 12.5 (L) 13.2 - 17.1 g/dL Final     Hematocrit   Date Value Ref Range Status   10/09/2020 37.2 (L) 40.0 - 54.0 % Final   09/29/2020 37.6 (L) 38.5 - 50.0 % Final     No results found for: IRON  No components found for: FROLATE  No results found for: GTFAGTYI57CO  WBC   Date Value Ref Range Status   10/09/2020 7.81 3.90 - 12.70 K/uL Final   09/29/2020 8.0 3.8 - 10.8 Thousand/uL Final       Assessment:     Nutrition/Diet History     Patient Reported Diet/Restrictions/Preferences: regular diet   Food Allergies: NKFA  Factors Affecting Nutritional Intake: pancreatic cancer    Estimated/Assessed Needs     Weight Used For Calorie Calculations: 86.8 kg (191 lb)  Energy Calorie Requirements (kcal): 3128-4615 kcal/day   Energy Need Method: 25-30 Kcal/kg  Protein Requirements: 104-130 g/day   Protein Need Method: 1.2-1.5 g/kg  Fluid Requirements: 2200 ml/day  Estimated Fluid Requirement Method: 1ml/kcal      Nutrition Support  N/A    Evaluation of Received Nutrient/Fluid Intake     Calorie Intake: meeting needs  Protein Intake: meeting needs  Fluid Intake: meeting needs  Tolerance: tolerating  % Intake of Estimated Energy Needs:  %      Nutrition Diagnosis Related to (Etiology) As Evidenced By (Signs/Symptoms)   No nutrition diagnosis at this time         RD Notes  Nutrition f/u today while pt received his chemo infusion with Gemzar/Abraxane for pancreatic cancer. Pt endorses improved appetite, especially 5-6 days following chemo. He is eating small, frequent meals/snacks q3-4 hrs as recommended. Supplementing his intake with at least 2 (sometimes 3) plant-based Ensure shakes/day. He has maintained his wt for the past 3 weeks now @ 188#. He endorses normal BMs every other  day. He denies any new nutrition-related concerns today.     Nutrition Intervention:      Nutrition Intervention Energy-modified diet   Goals/Expected Outcomes High-kcal/protein diet to promote wt maintenance.    Progress Progressing towards goal      Nutrition Intervention Medical food supplement: Commercial beverage   Goals/Expected Outcomes Ensure plant-based shakes TID.    Progress Initial       Plan  1. Reinforced importance of adequate nutritional & fluid intake during treatment course.   2. Continue small, frequent meals q3-4 hrs as tolerated.   3. Ensure plant-based shakes TID.   4. Pleased with wt maintenance thus far.   5. Pt has RD contact info. Encouraged him to call with any questions/concerns.     Monitoring/Evaluation:     Monitor: wt, p.o. intake    Next Visit: Will f/u prn.       I have explained and the patient understands all of  the current recommendation(s). I have answered all of their questions to the best of my ability and to their complete satisfaction.   The patient is to continue with the current management plan.    Electronically signed by: Renu Crenshaw MS, RDN, LDN

## 2020-11-15 NOTE — PROGRESS NOTES
[facility] hematology oncology in office encounter progress note  November 12, 2020    Chief complaint is pancreatic cancer      He has pancreatic cancer with liver metastases.   I reviewed the Gemzar and Abraxane chemotherapy regimen given weekly on days 01, 08, 15 of every 28 day cycle.  This could be given per his peripheral veins and would not require the PET placement of a mickie catheter or  midline.    Discuss premeds, following a blood counts weekly, possible need for Neupogen or Neulasta., watching him for possible peripheral neuropathy development while on chemotherapy.    He is having considerable pain on Norco  1 p.o. q.4-6 hours p.r.n.  Discussed keeping the Norco at 1 p.o. q.6 hours  Discussed the addition of oxycodone 5 mg 1 or 2 p.o. q.4 hours p.r.n. breakthrough pain.  Discussed the addition of Duragesic patch 25 mcg Q 72 hours, used as a pain preventive.    Today he gets day 8 course 1 of his Gemzar Abraxane on October 27, 2020.  He did have some hand and ankle and joint pain after day 1 course 1.  He denies increased peripheral neuropathy symptoms.  He had increased frequency of a normal stool for 3 or 4 days and is now back to his routine.    Baseline CA 19 9 was 10,447.  This will be monitored while on chemotherapy, in assessing his response.    Appetite and weight lab ok, but fatigue increased.  No changes in PN sx.  No rash or itching.    Given neupogen support.    Next Rx 11/24/20.  Gemzar Abraxane.

## 2020-11-23 NOTE — TELEPHONE ENCOUNTER
Spoke with patient's wife Lilia and told her that a RX for patient's fentanyl patches have been sent into his pharmacy. She verbalized understanding.

## 2020-11-24 NOTE — PLAN OF CARE
Problem: Fatigue  Goal: Improved Activity Tolerance  Intervention: Promote Energy Conservation  Flowsheets (Taken 11/24/2020 8060)  Fatigue Management: fatigue-related activity identified  Sleep/Rest Enhancement: regular sleep/rest pattern promoted  Activity Management: ambulated - L4

## 2020-11-25 NOTE — PROGRESS NOTES
[facility] hematology oncology in office encounter progress note  November 24, 2020    Chief complaint is pancreatic cancer      He has pancreatic cancer with liver metastases.   I reviewed the Gemzar and Abraxane chemotherapy regimen given weekly on days 01, 08, 15 of every 28 day cycle.  This could be given per his peripheral veins and would not require the PET placement of a mickie catheter or  midline.    Discuss premeds, following a blood counts weekly, possible need for Neupogen or Neulasta., watching him for possible peripheral neuropathy development while on chemotherapy.    He is having considerable pain on Norco  1 p.o. q.4-6 hours p.r.n.  Discussed keeping the Norco at 1 p.o. q.6 hours  Discussed the addition of oxycodone 5 mg 1 or 2 p.o. q.4 hours p.r.n. breakthrough pain.  Discussed the addition of Duragesic patch 25 mcg Q 72 hours, used as a pain preventive.    Today he gets day 1 course 2 of his Gemzar Abraxane on Nov. 24, 2020.  He did have some hand and ankle and joint pain after day 1 course 1.  He denies increased peripheral neuropathy symptoms.  He had increased frequency of a normal stool for 3 or 4 days and is now back to his routine.  N/V was not a problem for him.    Baseline CA 19 9 was 10,447.  This will be monitored while on chemotherapy, in assessing his response.    Appetite and weight lab ok, but fatigue increased.  No changes in PN sx.  No rash or itching.    Given neupogen support.    Next Rx 11/24/20.  D1C2.  Gemzar Abraxane.  RTC 1 week. D8C2.

## 2020-11-25 NOTE — PROGRESS NOTES
[facility] hematology oncology in office encounter progress note  November 24, 2020    Chief complaint is pancreatic cancer      He has pancreatic cancer with liver metastases.   I reviewed the Gemzar and Abraxane chemotherapy regimen given weekly on days 01, 08, 15 of every 28 day cycle.  This could be given per his peripheral veins and would not require the PET placement of a mickie catheter or  midline.    Discuss premeds, following a blood counts weekly, possible need for Neupogen or Neulasta., watching him for possible peripheral neuropathy development while on chemotherapy.    He is having considerable pain on Norco  1 p.o. q.4-6 hours p.r.n.  Discussed keeping the Norco at 1 p.o. q.6 hours  Discussed the addition of oxycodone 5 mg 1 or 2 p.o. q.4 hours p.r.n. breakthrough pain.  Discussed the addition of Duragesic patch 25 mcg Q 72 hours, used as a pain preventive.    Today he gets day 1 course 2 of his Gemzar Abraxane on Nov. 24, 2020.  He did have some hand and ankle and joint pain after day 1 course 1.  He denies increased peripheral neuropathy symptoms.  He had increased frequency of a normal stool for 3 or 4 days and is now back to his routine.  N/V not a problem for him.    Baseline CA 19 9 was 10,447.  This will be monitored while on chemotherapy, in assessing his response.    Appetite and weight lab ok, but fatigue increased.  No changes in PN sx.  No rash or itching.    Given neupogen support.    Rx 11/24/20.  D1C2.  Gemzar Abraxane.    RTC 1 week, D8C2.

## 2020-12-01 NOTE — PROGRESS NOTES
Medical Nutrition Therapy Oncology Progress Note      Patient's PCP:Peter Mendoza MD  Referring Provider: No ref. provider found    Recommendations/Interventions     Plan  1. Reinforced importance of adequate nutritional & fluid intake during treatment course.   2. Continue small, frequent meals q3-4 hrs as tolerated.   3. Ensure plant-based shakes BID.   4. Pt has RD contact info. Encouraged him to call with any questions/concerns.     Subjective:        Patient ID: Michael Watson is a 69 y.o. male.    Chief Complaint: nutrition f/u- pancreatic cancer    Past Medical History:   Diagnosis Date    Abdominal pain     radiates to mid back - stage 4 pancreatic cancer    Brain bleed 2000    Chronically low serum potassium     growth on kidney     Diverticulosis     Hypertension     Left-sided weakness     from previous brain events    Neuropathy     bilateral up to knees    Primary adenocarcinoma of pancreas 09/2020    Skin cancer     Subdural hematoma 2019    surgery    Thyroid disease        Past Surgical History:   Procedure Laterality Date    CHERISE HOLE FOR SUBDURAL HEMATOMA  2019    [facility]    CARPAL TUNNEL RELEASE Bilateral     ENDOSCOPIC ULTRASOUND OF UPPER GASTROINTESTINAL TRACT N/A 9/17/2020    Procedure: ULTRASOUND, UPPER GI TRACT, ENDOSCOPIC;  Surgeon: Massimo Figueroa III, MD;  Location: [facility];  Service: Endoscopy;  Laterality: N/A;    ENDOSCOPIC ULTRASOUND OF UPPER GASTROINTESTINAL TRACT N/A 10/12/2020    Procedure: ULTRASOUND, UPPER GI TRACT, ENDOSCOPIC;  Surgeon: Massimo Figueroa III, MD;  Location: [facility] ENDO;  Service: Endoscopy;  Laterality: N/A;    PENILE PROSTHESIS IMPLANT      Right shoulder repair      UVULECTOMY      removed hyoid bone        Social History     Socioeconomic History    Marital status:      Spouse name: Not on file    Number of children: Not on file    Years of education: Not  on file    Highest education level: Not on file   Occupational History    Not on file   Social Needs    Financial resource strain: Not on file    Food insecurity     Worry: Not on file     Inability: Not on file    Transportation needs     Medical: Not on file     Non-medical: Not on file   Tobacco Use    Smoking status: Former Smoker     Quit date:      Years since quittin.9    Smokeless tobacco: Never Used   Substance and Sexual Activity    Alcohol use: Not Currently     Comment: occas    Drug use: Yes     Types: Marijuana     Comment: 1-2 joints per day    Sexual activity: Not on file   Lifestyle    Physical activity     Days per week: Not on file     Minutes per session: Not on file    Stress: Not on file   Relationships    Social connections     Talks on phone: Not on file     Gets together: Not on file     Attends Sabianist service: Not on file     Active member of club or organization: Not on file     Attends meetings of clubs or organizations: Not on file     Relationship status: Not on file   Other Topics Concern    Not on file   Social History Narrative    Not on file       Family History   Problem Relation Age of Onset    Cancer Mother         lung CA    Heart disease Father     Cancer Paternal Grandmother         lung CA       Review of patient's allergies indicates:   Allergen Reactions    Norvasc [amlodipine]     Penicillins     Statins-hmg-coa reductase inhibitors     Sulfa (sulfonamide antibiotics)     Sulfur Other (See Comments)       Current Outpatient Medications:     allopurinoL (ZYLOPRIM) 100 MG tablet, Take 1 tablet (100 mg total) by mouth once daily., Disp: 90 tablet, Rfl: 1    cetirizine (ZYRTEC) 10 MG tablet, Take 10 mg by mouth once daily., Disp: , Rfl:     coenzyme Q10 (CO Q-10) 100 mg capsule, Take 100 mg by mouth once daily., Disp: , Rfl:     cycloSPORINE (RESTASIS) 0.05 % ophthalmic emulsion, Place 1 drop into both eyes 2 (two) times daily., Disp: 30  each, Rfl: 5    diazePAM (VALIUM) 5 MG tablet, Take 1 tablet (5 mg total) by mouth 2 (two) times a day. P.r.n. back spasms, Disp: 50 tablet, Rfl: 0    DULoxetine (CYMBALTA) 60 MG capsule, Take 1 capsule (60 mg total) by mouth once daily., Disp: 90 capsule, Rfl: 1    eplerenone (INSPRA) 50 MG Tab, Take 1 tablet (50 mg total) by mouth 2 (two) times daily., Disp: 180 tablet, Rfl: 1    ezetimibe (ZETIA) 10 mg tablet, Take 1 tablet (10 mg total) by mouth once daily., Disp: 90 tablet, Rfl: 1    famotidine (PEPCID) 20 MG tablet, Take 1 tablet (20 mg total) by mouth 2 (two) times daily., Disp: 180 tablet, Rfl: 1    fentaNYL (DURAGESIC) 25 mcg/hr, Place 1 patch onto the skin every 72 hours., Disp: 10 patch, Rfl: 0    finasteride (PROSCAR) 5 mg tablet, Take 1 tablet (5 mg total) by mouth once daily., Disp: 90 tablet, Rfl: 1    fluticasone propionate (FLONASE) 50 mcg/actuation nasal spray, 1 spray (50 mcg total) by Each Nostril route 2 (two) times a day., Disp: 48 g, Rfl: 3    hydrALAZINE (APRESOLINE) 50 MG tablet, Take 1 tablet (50 mg total) by mouth 2 (two) times daily., Disp: 180 tablet, Rfl: 1    HYDROcodone-acetaminophen (NORCO)  mg per tablet, Take 1 tablet by mouth every 6 (six) hours as needed for Pain., Disp: 120 tablet, Rfl: 0    indomethacin (INDOCIN) 25 MG capsule, Take 1 capsule (25 mg total) by mouth 2 (two) times daily as needed., Disp: 60 capsule, Rfl: 1    krill/om3/dha/epa/om6/lip/astx (KRILL OIL, OMEGA 3 AND 6, ORAL), Take by mouth., Disp: , Rfl:     levothyroxine (SYNTHROID) 25 MCG tablet, Take 1 tablet (25 mcg total) by mouth once daily., Disp: 90 tablet, Rfl: 1    lisinopriL-hydrochlorothiazide (ZESTORETIC) 20-25 mg Tab, Take 1 tablet by mouth 2 (two) times a day., Disp: 180 tablet, Rfl: 1    lubiprostone (AMITIZA) 24 MCG Cap, Take 1 capsule (24 mcg total) by mouth 2 (two) times daily with meals., Disp: 60 capsule, Rfl: 3    ondansetron (ZOFRAN-ODT) 8 MG TbDL, Take 1 tablet (8 mg  total) by mouth every 8 (eight) hours as needed., Disp: 30 tablet, Rfl: 5    oxyCODONE (ROXICODONE) 5 MG immediate release tablet, Take 1-2 tablet po q 4 hours prn pain, Disp: 120 tablet, Rfl: 0    pantoprazole (PROTONIX) 40 MG tablet, Take 1 tablet (40 mg total) by mouth once daily., Disp: 90 tablet, Rfl: 1    potassium chloride (MICRO-K) 10 MEQ CpSR, Take 1 capsule (10 mEq total) by mouth 2 (two) times daily., Disp: 180 capsule, Rfl: 1    promethazine (PHENERGAN) 25 MG tablet, Take 1 tablet (25 mg total) by mouth every 4 to 6 hours as needed., Disp: 30 tablet, Rfl: 5    sucralfate (CARAFATE) 1 gram tablet, Take 1 tablet (1 g total) by mouth 4 (four) times daily before meals and nightly., Disp: 120 tablet, Rfl: 5  No current facility-administered medications for this visit.     Facility-Administered Medications Ordered in Other Visits:     alteplase injection 2 mg, 2 mg, Intra-Catheter, PRN, JOON Hendricks MD    heparin, porcine (PF) 100 unit/mL injection flush 500 Units, 500 Units, Intravenous, PRN, JOON Hendricks MD    sodium chloride 0.9% 250 mL flush bag, , Intravenous, 1 time in Clinic/HOD, JOON Hendricks MD    sodium chloride 0.9% flush 10 mL, 10 mL, Intravenous, PRN, JOON Hendricks MD    All medications and past history have been reviewed.    OP PANC NAB-PACLITAXEL + GEMCITABINE Q4W      Treatment Goal:   Palliative      Status:   Active      Start Date:   10/21/2020      End Date:   9/14/2021 (Planned)      Provider:   JOON Hendricks MD      Chemotherapy:   PACLitaxel-protein bound (ABRAXANE) 125 mg/m2 = 260 mg in 52 mL infusion, 125 mg/m2 = 260 mg, Intravenous, Clinic/HOD 1 time, 2 of 12 cycles    Administration: 260 mg (10/21/2020), 260 mg (10/27/2020), 250 mg (11/10/2020), 260 mg (11/24/2020), 260 mg (12/1/2020)        gemcitabine 2,040 mg in sodium chloride 0.9% 250 mL chemo infusion, 1,000 mg/m2 = 2,040 mg, Intravenous, [facility] 1 time, 2 of 12 cycles    Administration: 2,040 mg  (10/21/2020), 2,040 mg (10/27/2020), 2,010 mg (11/10/2020), 2,040 mg (11/24/2020), 2,040 mg (12/1/2020)      Objective:        Wt Readings from Last 1 Encounters:   12/01/20 1016 85.8 kg (189 lb 1.6 oz)       Last Labs:  Glucose   Date Value Ref Range Status   10/09/2020 122 (H) 70 - 110 mg/dL Final   09/29/2020 127 65 - 139 mg/dL Final     Comment:               Non-fasting reference interval          BUN   Date Value Ref Range Status   10/09/2020 16 8 - 23 mg/dL Final   09/29/2020 12 7 - 25 mg/dL Final     Creatinine   Date Value Ref Range Status   10/09/2020 0.8 0.5 - 1.4 mg/dL Final   09/29/2020 0.84 0.70 - 1.18 mg/dL Final     Comment:     For patients >49 years of age, the reference limit  for Creatinine is approximately 13% higher for people  identified as -American.          Sodium   Date Value Ref Range Status   10/09/2020 137 136 - 145 mmol/L Final   09/29/2020 141 135 - 146 mmol/L Final     Potassium   Date Value Ref Range Status   10/09/2020 3.6 3.5 - 5.1 mmol/L Final   09/29/2020 3.8 3.5 - 5.3 mmol/L Final     No results found for: PHOS  Calcium   Date Value Ref Range Status   10/09/2020 9.3 8.7 - 10.5 mg/dL Final   09/29/2020 9.2 8.6 - 10.3 mg/dL Final     No results found for: PREALBUMIN  Total Protein   Date Value Ref Range Status   09/29/2020 6.8 6.1 - 8.1 g/dL Final   07/16/2020 7.2 6.1 - 8.1 g/dL Final     Cholesterol   Date Value Ref Range Status   07/16/2020 190 <200 mg/dL Final     No results found for: HGBA1C  Hemoglobin   Date Value Ref Range Status   10/09/2020 11.8 (L) 14.0 - 18.0 g/dL Final   09/29/2020 12.5 (L) 13.2 - 17.1 g/dL Final     Hematocrit   Date Value Ref Range Status   10/09/2020 37.2 (L) 40.0 - 54.0 % Final   09/29/2020 37.6 (L) 38.5 - 50.0 % Final     No results found for: IRON  No components found for: FROLATE  No results found for: [id number]  WBC   Date Value Ref Range Status   10/09/2020 7.81 3.90 - 12.70 K/uL Final   09/29/2020 8.0 3.8 - 10.8 Thousand/uL Final        Assessment:     Nutrition/Diet History     Patient Reported Diet/Restrictions/Preferences: regular diet   Food Allergies: NKFA  Factors Affecting Nutritional Intake: pancreatic cancer    Estimated/Assessed Needs     Weight Used For Calorie Calculations: 86.8 kg (191 lb)  Energy Calorie Requirements (kcal): 9418-0997 kcal/day   Energy Need Method: 25-30 Kcal/kg  Protein Requirements: 104-130 g/day   Protein Need Method: 1.2-1.5 g/kg  Fluid Requirements: 2200 ml/day  Estimated Fluid Requirement Method: 1ml/kcal      Nutrition Support  N/A    Evaluation of Received Nutrient/Fluid Intake     Calorie Intake: meeting needs  Protein Intake: meeting needs  Fluid Intake: meeting needs  Tolerance: tolerating  % Intake of Estimated Energy Needs:  %      Nutrition Diagnosis Related to (Etiology) As Evidenced By (Signs/Symptoms)   No nutrition diagnosis at this time         RD Notes  Nutrition f/u today while pt received his chemo infusion with Gemzar/Abraxane for pancreatic cancer. He endorses a good appetite- still eating well & supplementing with Ensure Plant based shakes BID. He reports that he needs to work on increasing his fluid intake throughout the day. He is having normal BMs daily. Wt stable @ 189#. He denies any new nutrition-related concerns at this time.     Nutrition Intervention:      Nutrition Intervention Energy-modified diet   Goals/Expected Outcomes High-kcal/protein diet to promote wt maintenance.    Progress Progressing towards goal     Nutrition Intervention Medical food supplement: Commercial beverage   Goals/Expected Outcomes Ensure plant-based shakes BID.    Progress Progressing towards goal     Plan  1. Reinforced importance of adequate nutritional & fluid intake during treatment course.   2. Continue small, frequent meals q3-4 hrs as tolerated.   3. Ensure plant-based shakes BID.   4. Pt has RD contact info. Encouraged him to call with any questions/concerns.     Monitoring/Evaluation:      Monitor: wt, p.o. intake    Next Visit: Will f/u prn.       I have explained and the patient understands all of  the current recommendation(s). I have answered all of their questions to the best of my ability and to their complete satisfaction.   The patient is to continue with the current management plan.    Electronically signed by: Renu Crenshaw MS, RDN, LDN

## 2020-12-01 NOTE — PLAN OF CARE
Problem: Fatigue (Oncology Care)  Goal: Improved Activity Tolerance  Intervention: Promote Energy Conservation  Flowsheets (Taken 12/1/2020 1031)  Fatigue Management: fatigue-related activity identified  Activity Management: ambulated - L4

## 2020-12-08 NOTE — PROGRESS NOTES
Per Dr. Alcaraz Benadryl changed to 12.5 mg. Potassium 3.1 increased Potassium Chloride to 20 mEq BID.

## 2020-12-08 NOTE — PLAN OF CARE
Problem: Neutropenia (Chemotherapy Effects)  Goal: Absence of Infection  Outcome: Ongoing, Progressing  Intervention: Prevent Infection and Maximize Resistance  Flowsheets (Taken 12/8/2020 1050)  Infection Prevention:   equipment surfaces disinfected   personal protective equipment utilized   visitors restricted/screened

## 2020-12-14 NOTE — PROGRESS NOTES
[facility] hematology oncology in office encounter progress note  December 8, 2020    Chief complaint is pancreatic cancer      He has pancreatic cancer with liver metastases.   I reviewed the Gemzar and Abraxane chemotherapy regimen given weekly on days 01, 08, 15 of every 28 day cycle.  This could be given per his peripheral veins and would not require the PET placement of a mickie catheter or  midline.    Discuss premeds, following a blood counts weekly, possible need for Neupogen or Neulasta., watching him for possible peripheral neuropathy development while on chemotherapy.    He is having considerable pain on Norco  1 p.o. q.4-6 hours p.r.n.  Discussed keeping the Norco at 1 p.o. q.6 hours  Discussed the addition of oxycodone 5 mg 1 or 2 p.o. q.4 hours p.r.n. breakthrough pain.  Discussed the addition of Duragesic patch 25 mcg Q 72 hours, used as a pain preventive.    Today he continues on his Gemzar Abraxane.  He did have some hand and ankle and joint pain after day 1 course 1.  He denies increased peripheral neuropathy symptoms.  He had increased frequency of a normal stool for 3 or 4 days and is now back to his routine.  N/V not a problem for him.  He is on course 5/6.  K 3.1.  Increase KCL 20 meq 2 BID.    He does not have nausea, vomiting, or diarrhea.  He does complain of some abdominal pain and back pain symptoms.  BM is normal.  He does have bone pain associated with Neupogen administration, not unexpected.    Baseline CA 19 9 was 10,447.  This will be monitored while on chemotherapy, in assessing his response.  CA 19 9 from December 7, 2020 was improved from 10,447 down to 968, suggesting a 90% drop in the value on chemotherapy.    Appetite and weight lab ok, but fatigue increased.  No changes in PN sx.  No rash or itching.    Given neupogen support.  After 4 cycles, plan to get a re-evaluation scan to check on the status of his disease.  Pancreatic cancer

## 2020-12-20 NOTE — TELEPHONE ENCOUNTER
Ask him if he has increased fatigue or VASQUES?    I recommend 2 units of pRBC to support him.    When T & M  When transfuse.

## 2020-12-22 NOTE — TELEPHONE ENCOUNTER
Pt would like to speak to Dr. Hendricks in regards to him receiving a blood transfusion.  Pt will be in infusion today.  Dr. Hendricks will go by and see him while he is there.

## 2020-12-26 PROBLEM — T45.1X5A ANEMIA DUE TO ANTINEOPLASTIC CHEMOTHERAPY: Status: ACTIVE | Noted: 2020-01-01

## 2020-12-26 PROBLEM — D64.81 ANEMIA DUE TO ANTINEOPLASTIC CHEMOTHERAPY: Status: ACTIVE | Noted: 2020-01-01

## 2020-12-27 NOTE — PROGRESS NOTES
[facility] hematology oncology in office encounter progress note  December 22, 2020    Chief complaint is pancreatic cancer      He has pancreatic cancer with liver metastases.   I reviewed the Gemzar and Abraxane chemotherapy regimen given weekly on days 01, 08, 15 of every 28 day cycle.  This could be given per his peripheral veins and would not require the PET placement of a mickie catheter or  midline.    Discuss premeds, following a blood counts weekly, possible need for Neupogen or Neulasta., watching him for possible peripheral neuropathy development while on chemotherapy.    He is having considerable pain on Norco  1 p.o. q.4-6 hours p.r.n.  Discussed keeping the Norco at 1 p.o. q.6 hours  Discussed the addition of oxycodone 5 mg 1 or 2 p.o. q.4 hours p.r.n. breakthrough pain.  Discussed the addition of Duragesic patch 25 mcg Q 72 hours, used as a pain preventive.    Today he continues on his Gemzar Abraxane.  He did have some hand and ankle and joint pain after day 1 course 1.  He denies increased peripheral neuropathy symptoms.  He had increased frequency of a normal stool for 3 or 4 days and is now back to his routine.  N/V not a problem for him.  He is on course 5/6.  K 3.1.  Increase KCL 20 meq 2 BID.    He does not have nausea, vomiting, or diarrhea.  He does complain of some abdominal pain and back pain symptoms.  BM is normal.  He does have bone pain associated with Neupogen administration, not unexpected.    Baseline CA 19 9 was 10,447.  This will be monitored while on chemotherapy, in assessing his response.  CA 19 9 from December 7, 2020 was improved from 10,447 down to 968, suggesting a 90% drop in the value on chemotherapy.  Now down to 600.    Appetite and weight lab ok, but fatigue increased.  No changes in PN sx.  Denies SOB, VASQUES.  No rash or itching.  Hgb 8.6.  Discussed and he agrees to begin Retacrit weekly for Hgb 10.    Given neupogen support.  He would like to check CAT befor  end of year if possible.  Pancreatic cancer

## 2020-12-28 NOTE — TELEPHONE ENCOUNTER
----- Message from Lilo Garner sent at 12/28/2020  3:35 PM CST -----  The patient needs a prescription for hydrocodone 10/325, oxycodone, and fentanyl patches. He said he will be in infusion tomorrow and will  when he comes in. His call back number is [phone].

## 2020-12-29 NOTE — PROGRESS NOTES
Medical Nutrition Therapy Oncology Progress Note      Patient's PCP:Peter Mendoza MD  Referring Provider: No ref. provider found    Recommendations/Interventions     Plan  1. Small, frequent meals q3-4 hrs as tolerated with high-kcal/protein foods (reviewed examples).   2. Aggressive fluid intake with added electrolytes.   3. Ensure shake once daily for supplemental protein.   4. Pt has RD contact info; encouraged him to call with any questions/concerns.     Subjective:        Patient ID: Michael Watson is a 70 y.o. male.    Chief Complaint: nutrition f/u     Past Medical History:   Diagnosis Date    Abdominal pain     radiates to mid back - stage 4 pancreatic cancer    Brain bleed 2000    Chronically low serum potassium     growth on kidney     Diverticulosis     Hypertension     Left-sided weakness     from previous brain events    Neuropathy     bilateral up to knees    Primary adenocarcinoma of pancreas 09/2020    Skin cancer     Subdural hematoma 2019    surgery    Thyroid disease        Past Surgical History:   Procedure Laterality Date    CHERISE HOLE FOR SUBDURAL HEMATOMA  2019    [employer]    CARPAL TUNNEL RELEASE Bilateral     ENDOSCOPIC ULTRASOUND OF UPPER GASTROINTESTINAL TRACT N/A 9/17/2020    Procedure: ULTRASOUND, UPPER GI TRACT, ENDOSCOPIC;  Surgeon: Massimo Figueroa III, MD;  [facility];  Service: Endoscopy;  Laterality: N/A;    ENDOSCOPIC ULTRASOUND OF UPPER GASTROINTESTINAL TRACT N/A 10/12/2020    Procedure: ULTRASOUND, UPPER GI TRACT, ENDOSCOPIC;  Surgeon: Massimo Figueroa III, MD;  Location: [facility] END[facility];  Service: Endoscopy;  Laterality: N/A;    PENILE PROSTHESIS IMPLANT      Right shoulder repair      UVULECTOMY      removed hyoid bone        Social History     Socioeconomic History    Marital status:      Spouse name: Not on file    Number of children: Not on file    Years of education:  Not on file    Highest education level: Not on file   Occupational History    Not on file   Social Needs    Financial resource strain: Not on file    Food insecurity     Worry: Not on file     Inability: Not on file    Transportation needs     Medical: Not on file     Non-medical: Not on file   Tobacco Use    Smoking status: Former Smoker     Quit date:      Years since quittin.0    Smokeless tobacco: Never Used   Substance and Sexual Activity    Alcohol use: Not Currently     Comment: occas    Drug use: Yes     Types: Marijuana     Comment: 1-2 joints per day    Sexual activity: Not on file   Lifestyle    Physical activity     Days per week: Not on file     Minutes per session: Not on file    Stress: Not on file   Relationships    Social connections     Talks on phone: Not on file     Gets together: Not on file     Attends [employer] service: Not on file     Active member of club or organization: Not on file     Attends meetings of clubs or organizations: Not on file     Relationship status: Not on file   Other Topics Concern    Not on file   Social History Narrative    Not on file       Family History   Problem Relation Age of Onset    Cancer Mother         lung CA    Heart disease Father     Cancer Paternal Grandmother         lung CA       Review of patient's allergies indicates:   Allergen Reactions    Norvasc [amlodipine]     Penicillins     Statins-hmg-coa reductase inhibitors     Sulfa (sulfonamide antibiotics)     Sulfur Other (See Comments)       Current Outpatient Medications:     allopurinoL (ZYLOPRIM) 100 MG tablet, Take 1 tablet (100 mg total) by mouth once daily., Disp: 90 tablet, Rfl: 1    cetirizine (ZYRTEC) 10 MG tablet, Take 10 mg by mouth once daily., Disp: , Rfl:     coenzyme Q10 (CO Q-10) 100 mg capsule, Take 100 mg by mouth once daily., Disp: , Rfl:     cycloSPORINE (RESTASIS) 0.05 % ophthalmic emulsion, Place 1 drop into both eyes 2 (two) times daily., Disp:  30 each, Rfl: 5    diazePAM (VALIUM) 5 MG tablet, Take 1 tablet (5 mg total) by mouth 2 (two) times a day. P.r.n. back spasms, Disp: 50 tablet, Rfl: 0    DULoxetine (CYMBALTA) 60 MG capsule, Take 1 capsule (60 mg total) by mouth once daily., Disp: 90 capsule, Rfl: 1    eplerenone (INSPRA) 50 MG Tab, Take 1 tablet (50 mg total) by mouth 2 (two) times daily., Disp: 180 tablet, Rfl: 1    ezetimibe (ZETIA) 10 mg tablet, Take 1 tablet (10 mg total) by mouth once daily., Disp: 90 tablet, Rfl: 1    famotidine (PEPCID) 20 MG tablet, Take 1 tablet (20 mg total) by mouth 2 (two) times daily., Disp: 180 tablet, Rfl: 1    fentaNYL (DURAGESIC) 25 mcg/hr, Place 1 patch onto the skin every 72 hours., Disp: 10 patch, Rfl: 0    finasteride (PROSCAR) 5 mg tablet, Take 1 tablet (5 mg total) by mouth once daily., Disp: 90 tablet, Rfl: 1    fluticasone propionate (FLONASE) 50 mcg/actuation nasal spray, 1 spray (50 mcg total) by Each Nostril route 2 (two) times a day., Disp: 48 g, Rfl: 3    hydrALAZINE (APRESOLINE) 50 MG tablet, Take 1 tablet (50 mg total) by mouth 2 (two) times daily., Disp: 180 tablet, Rfl: 1    HYDROcodone-acetaminophen (NORCO)  mg per tablet, Take 1 tablet by mouth every 6 (six) hours as needed for Pain., Disp: 120 tablet, Rfl: 0    indomethacin (INDOCIN) 25 MG capsule, Take 1 capsule (25 mg total) by mouth 2 (two) times daily as needed., Disp: 60 capsule, Rfl: 1    krill/om3/dha/epa/om6/lip/astx (KRILL OIL, OMEGA 3 AND 6, ORAL), Take by mouth., Disp: , Rfl:     levothyroxine (SYNTHROID) 25 MCG tablet, Take 1 tablet (25 mcg total) by mouth once daily., Disp: 90 tablet, Rfl: 1    lisinopriL-hydrochlorothiazide (ZESTORETIC) 20-25 mg Tab, Take 1 tablet by mouth 2 (two) times a day., Disp: 180 tablet, Rfl: 1    lubiprostone (AMITIZA) 24 MCG Cap, Take 1 capsule (24 mcg total) by mouth 2 (two) times daily with meals., Disp: 60 capsule, Rfl: 3    ondansetron (ZOFRAN-ODT) 8 MG TbDL, Take 1 tablet (8 mg  total) by mouth every 8 (eight) hours as needed., Disp: 30 tablet, Rfl: 5    oxyCODONE (ROXICODONE) 5 MG immediate release tablet, Take 1-2 tablet po q 4 hours prn pain, Disp: 120 tablet, Rfl: 0    pantoprazole (PROTONIX) 40 MG tablet, Take 1 tablet (40 mg total) by mouth once daily., Disp: 90 tablet, Rfl: 1    potassium chloride (MICRO-K) 10 MEQ CpSR, Take 1 capsule (10 mEq total) by mouth 2 (two) times daily., Disp: 180 capsule, Rfl: 1    promethazine (PHENERGAN) 25 MG tablet, Take 1 tablet (25 mg total) by mouth every 4 to 6 hours as needed., Disp: 30 tablet, Rfl: 5    sucralfate (CARAFATE) 1 gram tablet, Take 1 tablet (1 g total) by mouth 4 (four) times daily before meals and nightly., Disp: 120 tablet, Rfl: 5  No current facility-administered medications for this visit.     Facility-Administered Medications Ordered in Other Visits:     alteplase injection 2 mg, 2 mg, Intra-Catheter, PRN, JOON Hendricks MD    gemcitabine 2,050 mg in sodium chloride 0.9% 250 mL chemo infusion, 1,000 mg/m2, Intravenous, 1 time in Clinic/[facility]INO, JOON Hendricks MD    heparin, porcine (PF) 100 unit/mL injection flush 500 Units, 500 Units, Intravenous, PRN, JOON Hendricks MD    PACLitaxel-protein bound (ABRAXANE) 125 mg/m2 = 260 mg in 52 mL infusion, 125 mg/m2, Intravenous, 1 time in Clinic/[facility], JOON Hendricks MD, Last Rate: 52 mL/hr at 12/29/20 1353, 260 mg at 12/29/20 1353    sodium chloride 0.9% 250 mL flush bag, , Intravenous, 1 time in Clinic/[facility]INO, JOON Hendricks MD    sodium chloride 0.9% flush 10 mL, 10 mL, Intravenous, PRN, JOON Hendricks MD    All medications and past history have been reviewed.    OP PANC NAB-PACLITAXEL + GEMCITABINE Q4W      Treatment Goal:   Palliative      Status:   Active      Start Date:   10/21/2020      End Date:   9/14/2021 (Planned)      Provider:   JOON Hendricks MD      Chemotherapy:   PACLitaxel-protein bound (ABRAXANE) 125 mg/m2 = 260 mg in 52 mL infusion, 125 mg/m2 = 260 mg,  Intravenous, Clinic/HOD 1 time, 3 of 12 cycles    Administration: 260 mg (10/21/2020), 260 mg (10/27/2020), 250 mg (11/10/2020), 260 mg (11/24/2020), 260 mg (12/1/2020), 250 mg (12/8/2020), 250 mg (12/22/2020), 260 mg (12/29/2020)        gemcitabine 2,040 mg in sodium chloride 0.9% 250 mL chemo infusion, 1,000 mg/m2 = 2,040 mg, Intravenous, Clinic/HOD 1 time, 3 of 12 cycles    Administration: 2,040 mg (10/21/2020), 2,040 mg (10/27/2020), 2,010 mg (11/10/2020), 2,040 mg (11/24/2020), 2,040 mg (12/1/2020), 2,030 mg (12/8/2020), 1,980 mg (12/22/2020)      Objective:        Wt Readings from Last 1 Encounters:   12/29/20 1018 86.2 kg (190 lb)       Last Labs:  Glucose   Date Value Ref Range Status   10/09/2020 122 (H) 70 - 110 mg/dL Final   09/29/2020 127 65 - 139 mg/dL Final     Comment:               Non-fasting reference interval          BUN   Date Value Ref Range Status   10/09/2020 16 8 - 23 mg/dL Final   09/29/2020 12 7 - 25 mg/dL Final     Creatinine   Date Value Ref Range Status   10/09/2020 0.8 0.5 - 1.4 mg/dL Final   09/29/2020 0.84 0.70 - 1.18 mg/dL Final     Comment:     For patients >49 years of age, the reference limit  for Creatinine is approximately 13% higher for people  identified as -American.          Sodium   Date Value Ref Range Status   10/09/2020 137 136 - 145 mmol/L Final   09/29/2020 141 135 - 146 mmol/L Final     Potassium   Date Value Ref Range Status   10/09/2020 3.6 3.5 - 5.1 mmol/L Final   09/29/2020 3.8 3.5 - 5.3 mmol/L Final     No results found for: PHOS  Calcium   Date Value Ref Range Status   10/09/2020 9.3 8.7 - 10.5 mg/dL Final   09/29/2020 9.2 8.6 - 10.3 mg/dL Final     No results found for: PREALBUMIN  Total Protein   Date Value Ref Range Status   09/29/2020 6.8 6.1 - 8.1 g/dL Final   07/16/2020 7.2 6.1 - 8.1 g/dL Final     Cholesterol   Date Value Ref Range Status   07/16/2020 190 <200 mg/dL Final     No results found for: HGBA1C  Hemoglobin   Date Value Ref Range Status    10/09/2020 11.8 (L) 14.0 - 18.0 g/dL Final   09/29/2020 12.5 (L) 13.2 - 17.1 g/dL Final     Hematocrit   Date Value Ref Range Status   10/09/2020 37.2 (L) 40.0 - 54.0 % Final   09/29/2020 37.6 (L) 38.5 - 50.0 % Final     No results found for: IRON  No components found for: FROLATE  No results found for: [id number]  WBC   Date Value Ref Range Status   10/09/2020 7.81 3.90 - 12.70 K/uL Final   09/29/2020 8.0 3.8 - 10.8 Thousand/uL Final       Assessment:     Nutrition/Diet History     Patient Reported Diet/Restrictions/Preferences: regular diet   Food Allergies: NKFA  Factors Affecting Nutritional Intake: decreased appetite     Estimated/Assessed Needs     Weight Used For Calorie Calculations: 86.8 kg (191 lb)  Energy Calorie Requirements (kcal): 2285-1645 kcal/day   Energy Need Method: 25-30 Kcal/kg  Protein Requirements: 104-130 g/day   Protein Need Method: 1.2-1.5 g/kg  Fluid Requirements: 2200 ml/day  Estimated Fluid Requirement Method: 1ml/kcal      Nutrition Support  N/A    Evaluation of Received Nutrient/Fluid Intake     Calorie Intake: meeting needs  Protein Intake: meeting needs  Fluid Intake: meeting needs  Tolerance: tolerating  % Intake of Estimated Energy Needs:  %      Nutrition Diagnosis Related to (Etiology) As Evidenced By (Signs/Symptoms)   No nutrition diagnosis at this time         RD Notes  Mr. Watson is here today for his chemo infusion with Gemzar/Abraxane. He endorses a fair appetite- still consuming small, frequent meals 5-6x/day. Drinking plenty of water. He occasionally will drink an Ensure shake, but this is not daily. Having normal BMs. Infusion RN noted pt presented today with bilateral edema in both his arms & lower legs (med onc consulted). RD noted most recent H&H: 8.0 / 25.4. CW: 190#.     Nutrition Intervention:      Nutrition Intervention Energy-modified diet   Goals/Expected Outcomes High-kcal/protein diet to meet estimated energy needs.    Progress Initial     Plan  1.  Small, frequent meals q3-4 hrs as tolerated with high-kcal/protein foods (reviewed examples).   2. Aggressive fluid intake with added electrolytes.   3. Ensure shake once daily for supplemental protein.   4. Pt has RD contact info; encouraged him to call with any questions/concerns.     Monitoring/Evaluation:     Monitor: wt, p.o. intake    Next Visit: Will f/u prn.       I have explained and the patient understands all of  the current recommendation(s). I have answered all of their questions to the best of my ability and to their complete satisfaction.   The patient is to continue with the current management plan.    Electronically signed by: Renu Crenshaw MS, RDN, LDN

## 2020-12-29 NOTE — NURSING
Patient was given lasix 20 mg IVP at 1100 for +4 pitting edema bilaterally in lower extremities and +1 bilaterally in upper arms. Total output was 2125 mls.

## 2020-12-30 NOTE — TELEPHONE ENCOUNTER
Dr. Hendricks spoke to Dr. Shafer.  Dr. Shafer office is going to call pt and let him know what medications to take in regards to his fluid retention.  Spoke to pts wife.  Verbalized understanding.

## 2020-12-30 NOTE — PATIENT INSTRUCTIONS
Anemia During Cancer    When levels of healthy red blood cells (RBCs) in the body drop to levels that are below normal, the condition is called anemia. Anemia can occur during cancer and its treatment for many reasons. Read below to learn more about anemia during cancer and how its treated.  What is anemia?  RBCs are made in the bone marrow. Normal blood has about 35% to 50% RBCs. Anemic blood has less than 35% RBCs. RBCs carry oxygen around the body. If you have low levels of RBCs, not enough oxygen is delivered to your body. This may cause symptoms of dizziness, weakness, or tiredness. You may have trouble doing daily tasks. You may often feel cold. And you may be short of breath and have a rapid heartbeat. But some people with anemia have no symptoms.  Why anemia can occur with cancer  Anemia during cancer can have several causes:. These include:  · Treatments that destroy bone marrow, such as chemotherapy and radiation therapy  · Blood loss during surgery  · Low levels of certain B vitamins or iron  · Kidney disease leading to low levels of EPO (erythropoietin), a substance the body needs to make RBCs  · Wasting (nutritional problems and weight loss) from cancer that lower the bodys ability to make RBCs  Testing for anemia  A blood sample is taken from your arm and then tested. Several different types of tests may be done on this blood. Some count the numbers of blood cells. Others test for substances the body uses to make RBCs, such as vitamins and iron.  Treating anemia during cancer  Your treatment will depend on the cause of your anemia. It will also depend on how severe your symptoms are. Your doctor can tell you more about treatment options and their risks and benefits for you. Treatments include the following:  · RBC transfusion. A tube (cannula) is put into a vein in the hand or arm. RBCs from a donor are sent through the tube into the body. This increases the number of healthy RBCs in the body. This  can reverse anemia very quickly. RBC transfusions are safe. However, there are some risks. Your doctor will discuss them with you. Be sure you understand these risks.You may need to sign a consent form before receiving treatment.  · Erythropoiesis stimulating agent (YUNG). This is medicine that causes the body to make more RBCs. An YUNG is given as a shot. It may be given along with iron (see below). An YUNG takes several weeks or months to reverse anemia. There are special risks with YUNG treatment. Your doctor will discuss them with you. Be sure you understand these risks.You may need to sign a consent form before receiving treatment.  · Intravenous (IV) iron. A liquid medicine containing iron is given by shots or IV line. Several treatments may be given. IV iron is usually used if you are being given an YUNG. IV iron usually takes 1 to 4 weeks to reverse anemia.  · Oral supplements. Iron or vitamin B supplements may be given. These come in liquid or pill form, to take by mouth or they may be by injection. Oral supplements can take weeks or months to reverse anemia.  Checking your progress  During the course of your treatment, youll likely have more blood tests. These are to check your blood levels and your response to the treatment.  Risks and complications  Each treatment has its own risks. Your healthcare provider will tell you what risks apply to you. These may include:  · Fever  · Hives or other allergic reactions  · Iron overload  · High or low blood pressure  · Nausea  · Liver inflammation  · Blood clots   Date Last Reviewed: 12/27/2015 © 2000-2017 "[employer]. [address] [place] [address], [place] [address]. All rights reserved. This information is not intended as a substitute for professional medical care. Always follow your healthcare professional's instructions.

## 2020-12-30 NOTE — PROGRESS NOTES
[facility] hematology oncology in office subsequent encounter progress note  December 29, 2020    Chief complaint is pancreatic cancer      He has pancreatic cancer with liver metastases.   I reviewed the Gemzar and Abraxane chemotherapy regimen given weekly on days 01, 08, 15 of every 28 day cycle.  This could be given per his peripheral veins and would not require the PET placement of a mickie catheter or  midline.    Discuss premeds, following a blood counts weekly, possible need for Neupogen or Neulasta., watching him for possible peripheral neuropathy development while on chemotherapy.    He is having considerable pain on Norco  1 p.o. q.4-6 hours p.r.n.  Discussed keeping the Norco at 1 p.o. q.6 hours  Discussed the addition of oxycodone 5 mg 1 or 2 p.o. q.4 hours p.r.n. breakthrough pain.  Discussed the addition of Duragesic patch 25 mcg Q 72 hours, used as a pain preventive.    Today he continues on his Gemzar Abraxane.  He did have some hand and ankle and joint pain after day 1 course 1.  He denies increased peripheral neuropathy symptoms.  He had increased frequency of a normal stool for 3 or 4 days and is now back to his routine.  N/V not a problem for him.  He is on course 5/6.  K 3.1.  Increase KCL 20 meq 2 BID.  On last visit.    Today is day 8 of his cycle of chemotherapy.  Of note his weight has gone from 176-190, a gain of 14 lb over the last week or 2.  I had the nurse give him Lasix 20 mg IV and he did diurese 750 cc up 30 minutes later.  He does admit to having shortness of breath with exertion, his hemoglobin is, and I have started him on Retacrit weekly.  On examination he did have 1+ pitting edema bilaterally.    He has history of renal disease with hypoaldosteronism.  He has chronic hypokalemia.  His potassium is 3.3 and his creatinine is 0.76.  He sees Dr. Shafer of Nephrology.  I have a call out to her.    He admits to intermittent itching on his arms without rash, this is  probably secondary to his chemotherapy.    He does not have nausea, vomiting, or diarrhea.  He does complain of some abdominal pain and back pain symptoms.  BM is normal.  He does have bone pain associated with Neupogen administration, not unexpected.    Baseline CA 19 9 was 10,447.  This will be monitored while on chemotherapy, in assessing his response.  CA 19 9 from December 7, 2020 was improved from 10,447 down to 968, suggesting a 90% drop in the value on chemotherapy.  Now down to 600.  And most recently is down to 389.  CT scan has been ordered and is pending.    He does not appear acutely ill or chronically ill  He does not have palpable lymphadenopathy in the cervical, supraclavicular, axillary area  His lungs are clear bilaterally, breath sounds are distant, breathing is unlabored  He has regular rhythm without murmur  Abdomen is nontender, without distention, I do not palpate abdominal mass  Neurologically he is grossly intact  He has 1+ edema without petechiae or purpura    He has a hemoglobin of 8 and a white blood cell count of 3300, a potassium of 3.3 and a creatinine of 0.76 and his CA 19 9 is down to 389    Assessment 1.  Metastatic pancreatic cancer Gemzar Abraxane chemotherapy, clinically responding well with marked decrease in CA 19 9.  Re-evaluation with CT scan will be done in January 2021.    2. moderate anemia secondary to chronic chemotherapy and renal insufficiency.  Starting him today on Retacrit weekly with the titration for a hemoglobin of 10.    3.  Fluid retention of 14 lb with peripheral edema.  Given Lasix acutely with some diuresis of 750 cc.  He has renal disease history with hypoaldosteronism and chronic hypokalemia.  I have a call out to Dr. Shafer to discuss his diuretics.    Time spent on this visit today was 25 minutes.  The majority of this time was spent on direct patient care in managing his of acute fluid retention and peripheral edema a discussion with the nurse and  patient and administration of the Lasix IV.  Also reviewed with him the downward trend in his CA 19 9 and plans for upcoming CT scan.  Also during this time physical examination in assessing his pulmonary status and his of peripheral edema findings was carried out.  I would estimate 15 minutes of direct patient care and 10 minutes to document this note on epic.  This would be a total of 25 minutes for today's visit.    Return to clinic 1 week    Addendum December 30, 2020  I was able to speak with Dr. Shafer today about his fluid retention, diuretics, renal disease and hypoaldosteronism with chronic hypokalemia.  She said that she would talk with him today and would discuss with him his medications and make appropriate changes as needed.  We have informed Mr. Watson of my conversation with his nephrologist.

## 2021-01-01 ENCOUNTER — INFUSION (OUTPATIENT)
Dept: INFUSION THERAPY | Facility: HOSPITAL | Age: 71
End: 2021-01-01
Attending: INTERNAL MEDICINE
Payer: MEDICARE

## 2021-01-01 ENCOUNTER — OFFICE VISIT (OUTPATIENT)
Dept: HEMATOLOGY/ONCOLOGY | Facility: CLINIC | Age: 71
End: 2021-01-01
Payer: MEDICARE

## 2021-01-01 ENCOUNTER — TELEPHONE (OUTPATIENT)
Dept: HEMATOLOGY/ONCOLOGY | Facility: CLINIC | Age: 71
End: 2021-01-01

## 2021-01-01 ENCOUNTER — DOCUMENTATION ONLY (OUTPATIENT)
Dept: HEMATOLOGY/ONCOLOGY | Facility: CLINIC | Age: 71
End: 2021-01-01

## 2021-01-01 ENCOUNTER — TELEPHONE (OUTPATIENT)
Dept: RADIOLOGY | Facility: HOSPITAL | Age: 71
End: 2021-01-01

## 2021-01-01 ENCOUNTER — HOSPITAL ENCOUNTER (OUTPATIENT)
Dept: RADIOLOGY | Facility: HOSPITAL | Age: 71
Discharge: HOME | End: 2021-07-23
Attending: INTERNAL MEDICINE
Payer: MEDICARE

## 2021-01-01 ENCOUNTER — TELEPHONE (OUTPATIENT)
Dept: FAMILY MEDICINE | Facility: CLINIC | Age: 71
End: 2021-01-01

## 2021-01-01 ENCOUNTER — DOCUMENT SCAN (OUTPATIENT)
Dept: HOME HEALTH SERVICES | Facility: HOSPITAL | Age: 71
End: 2021-01-01

## 2021-01-01 ENCOUNTER — PATIENT MESSAGE (OUTPATIENT)
Dept: HEMATOLOGY/ONCOLOGY | Facility: CLINIC | Age: 71
End: 2021-01-01

## 2021-01-01 ENCOUNTER — HOSPITAL ENCOUNTER (EMERGENCY)
Facility: HOSPITAL | Age: 71
Discharge: HOME | End: 2021-06-18
Attending: EMERGENCY MEDICINE
Payer: MEDICARE

## 2021-01-01 ENCOUNTER — OFFICE VISIT (OUTPATIENT)
Dept: FAMILY MEDICINE | Facility: CLINIC | Age: 71
End: 2021-01-01
Payer: MEDICARE

## 2021-01-01 ENCOUNTER — HOSPITAL ENCOUNTER (OUTPATIENT)
Dept: RADIOLOGY | Facility: HOSPITAL | Age: 71
Discharge: HOME | End: 2021-01-06
Attending: INTERNAL MEDICINE
Payer: MEDICARE

## 2021-01-01 ENCOUNTER — TELEPHONE (OUTPATIENT)
Dept: INFUSION THERAPY | Facility: HOSPITAL | Age: 71
End: 2021-01-01

## 2021-01-01 ENCOUNTER — ANESTHESIA EVENT (OUTPATIENT)
Dept: SURGERY | Facility: HOSPITAL | Age: 71
DRG: 380 | End: 2021-01-01
Payer: MEDICARE

## 2021-01-01 ENCOUNTER — PATIENT OUTREACH (OUTPATIENT)
Dept: HOME HEALTH SERVICES | Facility: HOSPITAL | Age: 71
End: 2021-01-01

## 2021-01-01 ENCOUNTER — HOSPITAL ENCOUNTER (OUTPATIENT)
Dept: RADIOLOGY | Facility: HOSPITAL | Age: 71
Discharge: HOME | End: 2021-01-13
Attending: INTERNAL MEDICINE
Payer: MEDICARE

## 2021-01-01 ENCOUNTER — TELEPHONE (OUTPATIENT)
Dept: SURGERY | Facility: CLINIC | Age: 71
End: 2021-01-01

## 2021-01-01 ENCOUNTER — EXTERNAL HOME HEALTH (OUTPATIENT)
Dept: HOME HEALTH SERVICES | Facility: HOSPITAL | Age: 71
End: 2021-01-01

## 2021-01-01 ENCOUNTER — TUMOR BOARD CONFERENCE (OUTPATIENT)
Dept: SURGERY | Facility: CLINIC | Age: 71
End: 2021-01-01

## 2021-01-01 ENCOUNTER — LAB VISIT (OUTPATIENT)
Dept: LAB | Facility: HOSPITAL | Age: 71
End: 2021-01-01
Attending: INTERNAL MEDICINE
Payer: MEDICARE

## 2021-01-01 ENCOUNTER — TELEPHONE (OUTPATIENT)
Dept: HEMATOLOGY/ONCOLOGY | Facility: HOSPITAL | Age: 71
End: 2021-01-01

## 2021-01-01 ENCOUNTER — HOSPITAL ENCOUNTER (OUTPATIENT)
Dept: RADIOLOGY | Facility: HOSPITAL | Age: 71
Discharge: HOME | End: 2021-07-30
Attending: INTERNAL MEDICINE
Payer: MEDICARE

## 2021-01-01 ENCOUNTER — OFFICE VISIT (OUTPATIENT)
Dept: SURGERY | Facility: CLINIC | Age: 71
End: 2021-01-01
Payer: MEDICARE

## 2021-01-01 ENCOUNTER — HOSPITAL ENCOUNTER (INPATIENT)
Facility: HOSPITAL | Age: 71
LOS: 10 days | Discharge: HOME HEALTH | DRG: 380 | End: 2021-06-03
Attending: HOSPITALIST | Admitting: HOSPITALIST
Payer: MEDICARE

## 2021-01-01 ENCOUNTER — INFUSION (OUTPATIENT)
Dept: INFUSION THERAPY | Facility: HOSPITAL | Age: 71
DRG: 380 | End: 2021-01-01
Attending: INTERNAL MEDICINE
Payer: MEDICARE

## 2021-01-01 ENCOUNTER — PATIENT MESSAGE (OUTPATIENT)
Dept: ADMINISTRATIVE | Facility: OTHER | Age: 71
End: 2021-01-01

## 2021-01-01 ENCOUNTER — ANESTHESIA (OUTPATIENT)
Dept: SURGERY | Facility: HOSPITAL | Age: 71
DRG: 380 | End: 2021-01-01
Payer: MEDICARE

## 2021-01-01 ENCOUNTER — PATIENT OUTREACH (OUTPATIENT)
Dept: FAMILY MEDICINE | Facility: CLINIC | Age: 71
End: 2021-01-01

## 2021-01-01 VITALS
SYSTOLIC BLOOD PRESSURE: 140 MMHG | SYSTOLIC BLOOD PRESSURE: 160 MMHG | RESPIRATION RATE: 18 BRPM | TEMPERATURE: 98 F | WEIGHT: 188 LBS | OXYGEN SATURATION: 95 % | RESPIRATION RATE: 18 BRPM | TEMPERATURE: 98 F | DIASTOLIC BLOOD PRESSURE: 92 MMHG | HEIGHT: 69 IN | BODY MASS INDEX: 26.84 KG/M2 | HEIGHT: 69 IN | WEIGHT: 181.19 LBS | OXYGEN SATURATION: 98 % | BODY MASS INDEX: 27.85 KG/M2 | HEART RATE: 81 BPM | DIASTOLIC BLOOD PRESSURE: 70 MMHG | HEART RATE: 88 BPM

## 2021-01-01 VITALS
BODY MASS INDEX: 25.16 KG/M2 | TEMPERATURE: 99 F | WEIGHT: 174.63 LBS | SYSTOLIC BLOOD PRESSURE: 153 MMHG | HEART RATE: 70 BPM | BODY MASS INDEX: 26.55 KG/M2 | WEIGHT: 166 LBS | DIASTOLIC BLOOD PRESSURE: 77 MMHG | SYSTOLIC BLOOD PRESSURE: 140 MMHG | HEIGHT: 68 IN | HEART RATE: 72 BPM | DIASTOLIC BLOOD PRESSURE: 80 MMHG

## 2021-01-01 VITALS
DIASTOLIC BLOOD PRESSURE: 89 MMHG | HEART RATE: 71 BPM | TEMPERATURE: 97 F | HEIGHT: 69 IN | BODY MASS INDEX: 27.21 KG/M2 | SYSTOLIC BLOOD PRESSURE: 144 MMHG | RESPIRATION RATE: 18 BRPM | WEIGHT: 183.69 LBS | OXYGEN SATURATION: 98 %

## 2021-01-01 VITALS
WEIGHT: 180.69 LBS | RESPIRATION RATE: 18 BRPM | DIASTOLIC BLOOD PRESSURE: 69 MMHG | SYSTOLIC BLOOD PRESSURE: 156 MMHG | SYSTOLIC BLOOD PRESSURE: 134 MMHG | TEMPERATURE: 98 F | DIASTOLIC BLOOD PRESSURE: 74 MMHG | DIASTOLIC BLOOD PRESSURE: 70 MMHG | HEART RATE: 94 BPM | RESPIRATION RATE: 17 BRPM | HEIGHT: 69 IN | HEART RATE: 97 BPM | RESPIRATION RATE: 19 BRPM | TEMPERATURE: 99 F | WEIGHT: 180.88 LBS | HEART RATE: 70 BPM | BODY MASS INDEX: 27.13 KG/M2 | TEMPERATURE: 97 F | BODY MASS INDEX: 27.11 KG/M2 | SYSTOLIC BLOOD PRESSURE: 115 MMHG | BODY MASS INDEX: 27.08 KG/M2 | OXYGEN SATURATION: 97 % | WEIGHT: 183.19 LBS

## 2021-01-01 VITALS
OXYGEN SATURATION: 96 % | RESPIRATION RATE: 18 BRPM | DIASTOLIC BLOOD PRESSURE: 58 MMHG | SYSTOLIC BLOOD PRESSURE: 106 MMHG | BODY MASS INDEX: 22.35 KG/M2 | HEART RATE: 94 BPM | WEIGHT: 147 LBS | HEART RATE: 48 BPM | TEMPERATURE: 98 F | SYSTOLIC BLOOD PRESSURE: 100 MMHG | DIASTOLIC BLOOD PRESSURE: 70 MMHG

## 2021-01-01 VITALS
OXYGEN SATURATION: 98 % | DIASTOLIC BLOOD PRESSURE: 83 MMHG | DIASTOLIC BLOOD PRESSURE: 70 MMHG | SYSTOLIC BLOOD PRESSURE: 102 MMHG | OXYGEN SATURATION: 98 % | RESPIRATION RATE: 18 BRPM | RESPIRATION RATE: 18 BRPM | HEART RATE: 109 BPM | TEMPERATURE: 98 F | TEMPERATURE: 98 F | HEART RATE: 96 BPM | SYSTOLIC BLOOD PRESSURE: 126 MMHG

## 2021-01-01 VITALS
SYSTOLIC BLOOD PRESSURE: 169 MMHG | TEMPERATURE: 98 F | TEMPERATURE: 98 F | HEIGHT: 68 IN | HEART RATE: 106 BPM | HEART RATE: 86 BPM | OXYGEN SATURATION: 94 % | DIASTOLIC BLOOD PRESSURE: 88 MMHG | BODY MASS INDEX: 22.59 KG/M2 | RESPIRATION RATE: 18 BRPM | RESPIRATION RATE: 19 BRPM | OXYGEN SATURATION: 97 % | SYSTOLIC BLOOD PRESSURE: 131 MMHG | DIASTOLIC BLOOD PRESSURE: 95 MMHG | WEIGHT: 149.06 LBS

## 2021-01-01 VITALS
OXYGEN SATURATION: 95 % | SYSTOLIC BLOOD PRESSURE: 112 MMHG | BODY MASS INDEX: 23.32 KG/M2 | RESPIRATION RATE: 18 BRPM | RESPIRATION RATE: 18 BRPM | HEART RATE: 100 BPM | DIASTOLIC BLOOD PRESSURE: 94 MMHG | WEIGHT: 153.88 LBS | SYSTOLIC BLOOD PRESSURE: 154 MMHG | SYSTOLIC BLOOD PRESSURE: 147 MMHG | HEIGHT: 68 IN | TEMPERATURE: 98 F | HEART RATE: 92 BPM | DIASTOLIC BLOOD PRESSURE: 55 MMHG | WEIGHT: 156.88 LBS | BODY MASS INDEX: 23.86 KG/M2 | WEIGHT: 157 LBS | HEIGHT: 68 IN | TEMPERATURE: 99 F | HEART RATE: 54 BPM | BODY MASS INDEX: 23.79 KG/M2 | DIASTOLIC BLOOD PRESSURE: 89 MMHG

## 2021-01-01 VITALS
BODY MASS INDEX: 27.79 KG/M2 | HEART RATE: 90 BPM | OXYGEN SATURATION: 95 % | DIASTOLIC BLOOD PRESSURE: 68 MMHG | WEIGHT: 182.81 LBS | RESPIRATION RATE: 18 BRPM | SYSTOLIC BLOOD PRESSURE: 123 MMHG | TEMPERATURE: 99 F

## 2021-01-01 VITALS
SYSTOLIC BLOOD PRESSURE: 154 MMHG | HEIGHT: 68 IN | TEMPERATURE: 98 F | WEIGHT: 156.88 LBS | RESPIRATION RATE: 18 BRPM | DIASTOLIC BLOOD PRESSURE: 93 MMHG | HEART RATE: 84 BPM | BODY MASS INDEX: 23.78 KG/M2

## 2021-01-01 VITALS
TEMPERATURE: 98 F | HEART RATE: 93 BPM | HEIGHT: 68 IN | DIASTOLIC BLOOD PRESSURE: 77 MMHG | SYSTOLIC BLOOD PRESSURE: 160 MMHG | WEIGHT: 177.63 LBS | OXYGEN SATURATION: 96 % | RESPIRATION RATE: 18 BRPM | RESPIRATION RATE: 18 BRPM | BODY MASS INDEX: 26.92 KG/M2 | OXYGEN SATURATION: 94 % | BODY MASS INDEX: 27.65 KG/M2 | WEIGHT: 182.44 LBS | HEART RATE: 3 BPM | DIASTOLIC BLOOD PRESSURE: 78 MMHG | TEMPERATURE: 98 F | SYSTOLIC BLOOD PRESSURE: 175 MMHG | HEIGHT: 68 IN | RESPIRATION RATE: 17 BRPM | DIASTOLIC BLOOD PRESSURE: 86 MMHG | HEART RATE: 53 BPM | SYSTOLIC BLOOD PRESSURE: 147 MMHG

## 2021-01-01 VITALS
WEIGHT: 150 LBS | TEMPERATURE: 98 F | RESPIRATION RATE: 18 BRPM | HEART RATE: 10 BPM | BODY MASS INDEX: 22.73 KG/M2 | SYSTOLIC BLOOD PRESSURE: 113 MMHG | DIASTOLIC BLOOD PRESSURE: 83 MMHG | HEIGHT: 68 IN

## 2021-01-01 VITALS
SYSTOLIC BLOOD PRESSURE: 100 MMHG | HEART RATE: 100 BPM | HEIGHT: 68 IN | RESPIRATION RATE: 18 BRPM | WEIGHT: 150.5 LBS | OXYGEN SATURATION: 97 % | TEMPERATURE: 98 F | BODY MASS INDEX: 22.81 KG/M2 | DIASTOLIC BLOOD PRESSURE: 60 MMHG

## 2021-01-01 VITALS
SYSTOLIC BLOOD PRESSURE: 169 MMHG | BODY MASS INDEX: 26.67 KG/M2 | DIASTOLIC BLOOD PRESSURE: 54 MMHG | DIASTOLIC BLOOD PRESSURE: 85 MMHG | SYSTOLIC BLOOD PRESSURE: 102 MMHG | HEART RATE: 99 BPM | BODY MASS INDEX: 27.25 KG/M2 | WEIGHT: 184 LBS | RESPIRATION RATE: 18 BRPM | WEIGHT: 178 LBS | TEMPERATURE: 99 F | HEIGHT: 69 IN | HEART RATE: 101 BPM | TEMPERATURE: 99 F

## 2021-01-01 VITALS
SYSTOLIC BLOOD PRESSURE: 160 MMHG | DIASTOLIC BLOOD PRESSURE: 83 MMHG | WEIGHT: 184 LBS | TEMPERATURE: 98 F | BODY MASS INDEX: 27.98 KG/M2 | HEART RATE: 41 BPM

## 2021-01-01 VITALS
BODY MASS INDEX: 26.57 KG/M2 | SYSTOLIC BLOOD PRESSURE: 154 MMHG | WEIGHT: 175.31 LBS | HEIGHT: 68 IN | DIASTOLIC BLOOD PRESSURE: 89 MMHG | OXYGEN SATURATION: 96 % | TEMPERATURE: 98 F | RESPIRATION RATE: 20 BRPM | HEART RATE: 86 BPM

## 2021-01-01 VITALS
HEART RATE: 83 BPM | DIASTOLIC BLOOD PRESSURE: 86 MMHG | OXYGEN SATURATION: 98 % | BODY MASS INDEX: 29.47 KG/M2 | TEMPERATURE: 98 F | RESPIRATION RATE: 18 BRPM | SYSTOLIC BLOOD PRESSURE: 163 MMHG | WEIGHT: 196.63 LBS

## 2021-01-01 VITALS
TEMPERATURE: 98 F | SYSTOLIC BLOOD PRESSURE: 124 MMHG | HEART RATE: 95 BPM | WEIGHT: 153.88 LBS | OXYGEN SATURATION: 97 % | BODY MASS INDEX: 23.4 KG/M2 | RESPIRATION RATE: 20 BRPM | DIASTOLIC BLOOD PRESSURE: 82 MMHG

## 2021-01-01 VITALS
HEART RATE: 89 BPM | DIASTOLIC BLOOD PRESSURE: 64 MMHG | HEART RATE: 75 BPM | BODY MASS INDEX: 27.65 KG/M2 | WEIGHT: 186.19 LBS | TEMPERATURE: 99 F | BODY MASS INDEX: 27.9 KG/M2 | SYSTOLIC BLOOD PRESSURE: 172 MMHG | DIASTOLIC BLOOD PRESSURE: 83 MMHG | WEIGHT: 184.5 LBS | TEMPERATURE: 98 F | RESPIRATION RATE: 18 BRPM | RESPIRATION RATE: 18 BRPM | SYSTOLIC BLOOD PRESSURE: 185 MMHG

## 2021-01-01 VITALS
BODY MASS INDEX: 24.18 KG/M2 | TEMPERATURE: 99 F | DIASTOLIC BLOOD PRESSURE: 93 MMHG | SYSTOLIC BLOOD PRESSURE: 166 MMHG | HEART RATE: 88 BPM | RESPIRATION RATE: 18 BRPM | WEIGHT: 159 LBS

## 2021-01-01 VITALS
TEMPERATURE: 99 F | OXYGEN SATURATION: 97 % | RESPIRATION RATE: 18 BRPM | DIASTOLIC BLOOD PRESSURE: 62 MMHG | BODY MASS INDEX: 26.72 KG/M2 | SYSTOLIC BLOOD PRESSURE: 108 MMHG | HEART RATE: 103 BPM | WEIGHT: 178.31 LBS

## 2021-01-01 VITALS
OXYGEN SATURATION: 95 % | TEMPERATURE: 99 F | DIASTOLIC BLOOD PRESSURE: 82 MMHG | HEIGHT: 68 IN | BODY MASS INDEX: 23.34 KG/M2 | HEART RATE: 78 BPM | SYSTOLIC BLOOD PRESSURE: 150 MMHG | RESPIRATION RATE: 18 BRPM | WEIGHT: 154 LBS

## 2021-01-01 VITALS
WEIGHT: 180 LBS | SYSTOLIC BLOOD PRESSURE: 138 MMHG | BODY MASS INDEX: 27.28 KG/M2 | HEART RATE: 72 BPM | HEIGHT: 68 IN | DIASTOLIC BLOOD PRESSURE: 68 MMHG

## 2021-01-01 VITALS
BODY MASS INDEX: 26.22 KG/M2 | WEIGHT: 177 LBS | HEART RATE: 94 BPM | HEIGHT: 69 IN | SYSTOLIC BLOOD PRESSURE: 96 MMHG | DIASTOLIC BLOOD PRESSURE: 58 MMHG

## 2021-01-01 VITALS
TEMPERATURE: 98 F | HEART RATE: 65 BPM | OXYGEN SATURATION: 95 % | RESPIRATION RATE: 18 BRPM | SYSTOLIC BLOOD PRESSURE: 157 MMHG | DIASTOLIC BLOOD PRESSURE: 76 MMHG

## 2021-01-01 VITALS
DIASTOLIC BLOOD PRESSURE: 83 MMHG | WEIGHT: 184 LBS | BODY MASS INDEX: 27.57 KG/M2 | OXYGEN SATURATION: 98 % | RESPIRATION RATE: 18 BRPM | SYSTOLIC BLOOD PRESSURE: 161 MMHG | TEMPERATURE: 98 F | HEART RATE: 79 BPM

## 2021-01-01 VITALS
SYSTOLIC BLOOD PRESSURE: 198 MMHG | TEMPERATURE: 98 F | WEIGHT: 184.5 LBS | BODY MASS INDEX: 27.33 KG/M2 | HEIGHT: 69 IN | HEART RATE: 76 BPM | DIASTOLIC BLOOD PRESSURE: 92 MMHG | RESPIRATION RATE: 18 BRPM

## 2021-01-01 VITALS — DIASTOLIC BLOOD PRESSURE: 72 MMHG | SYSTOLIC BLOOD PRESSURE: 138 MMHG

## 2021-01-01 VITALS
BODY MASS INDEX: 26.94 KG/M2 | TEMPERATURE: 98 F | SYSTOLIC BLOOD PRESSURE: 152 MMHG | DIASTOLIC BLOOD PRESSURE: 65 MMHG | RESPIRATION RATE: 16 BRPM | HEART RATE: 50 BPM | WEIGHT: 179.81 LBS

## 2021-01-01 VITALS
HEIGHT: 69 IN | BODY MASS INDEX: 26.46 KG/M2 | OXYGEN SATURATION: 96 % | RESPIRATION RATE: 20 BRPM | HEART RATE: 93 BPM | DIASTOLIC BLOOD PRESSURE: 68 MMHG | SYSTOLIC BLOOD PRESSURE: 122 MMHG | WEIGHT: 178.63 LBS | TEMPERATURE: 98 F

## 2021-01-01 VITALS
HEIGHT: 68 IN | SYSTOLIC BLOOD PRESSURE: 128 MMHG | DIASTOLIC BLOOD PRESSURE: 87 MMHG | HEART RATE: 109 BPM | BODY MASS INDEX: 22.35 KG/M2 | RESPIRATION RATE: 16 BRPM

## 2021-01-01 VITALS
WEIGHT: 148 LBS | DIASTOLIC BLOOD PRESSURE: 60 MMHG | HEIGHT: 68 IN | BODY MASS INDEX: 22.43 KG/M2 | SYSTOLIC BLOOD PRESSURE: 120 MMHG | HEART RATE: 88 BPM

## 2021-01-01 DIAGNOSIS — D64.81 ANEMIA ASSOCIATED WITH CHEMOTHERAPY: Primary | ICD-10-CM

## 2021-01-01 DIAGNOSIS — C25.1 PRIMARY ADENOCARCINOMA OF BODY OF PANCREAS: ICD-10-CM

## 2021-01-01 DIAGNOSIS — M51.36 DEGENERATION OF LUMBAR INTERVERTEBRAL DISC: Primary | ICD-10-CM

## 2021-01-01 DIAGNOSIS — K56.609 SBO (SMALL BOWEL OBSTRUCTION): ICD-10-CM

## 2021-01-01 DIAGNOSIS — T45.1X5A CHEMOTHERAPY-INDUCED NEUTROPENIA: Primary | ICD-10-CM

## 2021-01-01 DIAGNOSIS — G60.9 IDIOPATHIC PERIPHERAL NEUROPATHY: ICD-10-CM

## 2021-01-01 DIAGNOSIS — R18.8 OTHER ASCITES: ICD-10-CM

## 2021-01-01 DIAGNOSIS — C78.7 METASTASIS TO LIVER: Primary | ICD-10-CM

## 2021-01-01 DIAGNOSIS — C78.7 METASTASIS TO LIVER: ICD-10-CM

## 2021-01-01 DIAGNOSIS — E27.49 HYPORENINEMIC HYPOALDOSTERONISM: ICD-10-CM

## 2021-01-01 DIAGNOSIS — R07.9 CHEST PAIN: ICD-10-CM

## 2021-01-01 DIAGNOSIS — N28.9 KIDNEY DISEASE WITH FLUID RETENTION: ICD-10-CM

## 2021-01-01 DIAGNOSIS — R53.81 PHYSICAL DEBILITY: ICD-10-CM

## 2021-01-01 DIAGNOSIS — D64.81 ANEMIA DUE TO ANTINEOPLASTIC CHEMOTHERAPY: ICD-10-CM

## 2021-01-01 DIAGNOSIS — D64.81 ANEMIA DUE TO ANTINEOPLASTIC CHEMOTHERAPY: Primary | ICD-10-CM

## 2021-01-01 DIAGNOSIS — M51.36 DEGENERATION OF LUMBAR INTERVERTEBRAL DISC: ICD-10-CM

## 2021-01-01 DIAGNOSIS — L89.150 PRESSURE INJURY OF SACRAL REGION, UNSTAGEABLE: ICD-10-CM

## 2021-01-01 DIAGNOSIS — K21.9 GASTROESOPHAGEAL REFLUX DISEASE WITHOUT ESOPHAGITIS: ICD-10-CM

## 2021-01-01 DIAGNOSIS — T45.1X5A ANEMIA DUE TO ANTINEOPLASTIC CHEMOTHERAPY: ICD-10-CM

## 2021-01-01 DIAGNOSIS — Z03.818 ENCNTR FOR OBS FOR SUSP EXPSR TO OTH BIOLG AGENTS RULED OUT: Primary | ICD-10-CM

## 2021-01-01 DIAGNOSIS — E26.9 HYPERALDOSTERONISM: ICD-10-CM

## 2021-01-01 DIAGNOSIS — R63.0 DECREASED APPETITE: ICD-10-CM

## 2021-01-01 DIAGNOSIS — Z71.89 COUNSELING REGARDING ADVANCED CARE PLANNING AND GOALS OF CARE: ICD-10-CM

## 2021-01-01 DIAGNOSIS — C25.1 PRIMARY ADENOCARCINOMA OF BODY OF PANCREAS: Primary | ICD-10-CM

## 2021-01-01 DIAGNOSIS — D70.1 CHEMOTHERAPY-INDUCED NEUTROPENIA: Primary | ICD-10-CM

## 2021-01-01 DIAGNOSIS — D70.9 NEUTROPENIA, UNSPECIFIED TYPE: Primary | ICD-10-CM

## 2021-01-01 DIAGNOSIS — R50.81 FEVER AND NEUTROPENIA: ICD-10-CM

## 2021-01-01 DIAGNOSIS — N13.8 BPH WITH OBSTRUCTION/LOWER URINARY TRACT SYMPTOMS: ICD-10-CM

## 2021-01-01 DIAGNOSIS — D70.9 FEVER AND NEUTROPENIA: Primary | ICD-10-CM

## 2021-01-01 DIAGNOSIS — R50.81 FEVER AND NEUTROPENIA: Primary | ICD-10-CM

## 2021-01-01 DIAGNOSIS — I10 ESSENTIAL HYPERTENSION: ICD-10-CM

## 2021-01-01 DIAGNOSIS — R18.0 MALIGNANT ASCITES: ICD-10-CM

## 2021-01-01 DIAGNOSIS — E78.00 PURE HYPERCHOLESTEROLEMIA: ICD-10-CM

## 2021-01-01 DIAGNOSIS — D70.9 FEVER AND NEUTROPENIA: ICD-10-CM

## 2021-01-01 DIAGNOSIS — T45.1X5A ANEMIA DUE TO ANTINEOPLASTIC CHEMOTHERAPY: Primary | ICD-10-CM

## 2021-01-01 DIAGNOSIS — T45.1X5A CHEMOTHERAPY-INDUCED NEUTROPENIA: ICD-10-CM

## 2021-01-01 DIAGNOSIS — D70.1 CHEMOTHERAPY-INDUCED NEUTROPENIA: ICD-10-CM

## 2021-01-01 DIAGNOSIS — D70.9 NEUTROPENIA, UNSPECIFIED TYPE: ICD-10-CM

## 2021-01-01 DIAGNOSIS — Z86.79 HISTORY OF SUBDURAL HEMATOMA: ICD-10-CM

## 2021-01-01 DIAGNOSIS — T45.1X5A ANEMIA ASSOCIATED WITH CHEMOTHERAPY: Primary | ICD-10-CM

## 2021-01-01 DIAGNOSIS — K59.03 DRUG-INDUCED CONSTIPATION: ICD-10-CM

## 2021-01-01 DIAGNOSIS — M62.838 MUSCLE SPASMS OF BOTH LOWER EXTREMITIES: ICD-10-CM

## 2021-01-01 DIAGNOSIS — R10.10 PAIN OF UPPER ABDOMEN: Primary | ICD-10-CM

## 2021-01-01 DIAGNOSIS — G89.3 CHRONIC PAIN DUE TO NEOPLASM: ICD-10-CM

## 2021-01-01 DIAGNOSIS — E86.0 DEHYDRATION: Primary | ICD-10-CM

## 2021-01-01 DIAGNOSIS — C25.2 MALIGNANT NEOPLASM OF TAIL OF PANCREAS: Primary | ICD-10-CM

## 2021-01-01 DIAGNOSIS — E87.6 HYPOKALEMIA: ICD-10-CM

## 2021-01-01 DIAGNOSIS — N40.1 BPH WITH OBSTRUCTION/LOWER URINARY TRACT SYMPTOMS: ICD-10-CM

## 2021-01-01 DIAGNOSIS — G89.3 CANCER RELATED PAIN: ICD-10-CM

## 2021-01-01 DIAGNOSIS — R11.0 NAUSEA: ICD-10-CM

## 2021-01-01 DIAGNOSIS — R18.8 OTHER ASCITES: Primary | ICD-10-CM

## 2021-01-01 DIAGNOSIS — R53.81 PHYSICAL DEBILITY: Primary | ICD-10-CM

## 2021-01-01 DIAGNOSIS — E86.0 DEHYDRATION: ICD-10-CM

## 2021-01-01 DIAGNOSIS — K59.00 CONSTIPATION, UNSPECIFIED CONSTIPATION TYPE: ICD-10-CM

## 2021-01-01 DIAGNOSIS — E03.9 ACQUIRED HYPOTHYROIDISM: ICD-10-CM

## 2021-01-01 DIAGNOSIS — R11.2 NAUSEA AND VOMITING IN ADULT PATIENT: ICD-10-CM

## 2021-01-01 DIAGNOSIS — G89.3 CANCER ASSOCIATED PAIN: ICD-10-CM

## 2021-01-01 DIAGNOSIS — D68.9 COAGULOPATHY: ICD-10-CM

## 2021-01-01 DIAGNOSIS — R11.2 NAUSEA AND VOMITING, INTRACTABILITY OF VOMITING NOT SPECIFIED, UNSPECIFIED VOMITING TYPE: ICD-10-CM

## 2021-01-01 DIAGNOSIS — Z09 HOSPITAL DISCHARGE FOLLOW-UP: ICD-10-CM

## 2021-01-01 DIAGNOSIS — E46 MALNUTRITION, UNSPECIFIED TYPE: ICD-10-CM

## 2021-01-01 LAB
ABO + RH BLD: NORMAL
ABO + RH BLD: NORMAL
ALBUMIN SERPL BCP-MCNC: 2.4 G/DL (ref 3.5–5.2)
ALBUMIN SERPL BCP-MCNC: 2.6 G/DL (ref 3.5–5.2)
ALBUMIN SERPL BCP-MCNC: 2.7 G/DL (ref 3.5–5.2)
ALBUMIN SERPL BCP-MCNC: 2.8 G/DL (ref 3.5–5.2)
ALBUMIN SERPL BCP-MCNC: 2.9 G/DL (ref 3.5–5.2)
ALBUMIN SERPL BCP-MCNC: 3.2 G/DL (ref 3.5–5.2)
ALBUMIN SERPL BCP-MCNC: 3.3 G/DL (ref 3.5–5.2)
ALP SERPL-CCNC: 104 U/L (ref 55–135)
ALP SERPL-CCNC: 113 U/L (ref 55–135)
ALP SERPL-CCNC: 114 U/L (ref 55–135)
ALP SERPL-CCNC: 132 U/L (ref 55–135)
ALP SERPL-CCNC: 159 U/L (ref 55–135)
ALP SERPL-CCNC: 162 U/L (ref 55–135)
ALP SERPL-CCNC: 79 U/L (ref 55–135)
ALP SERPL-CCNC: 79 U/L (ref 55–135)
ALP SERPL-CCNC: 83 U/L (ref 55–135)
ALP SERPL-CCNC: 92 U/L (ref 55–135)
ALP SERPL-CCNC: 92 U/L (ref 55–135)
ALT SERPL W/O P-5'-P-CCNC: 24 U/L (ref 10–44)
ALT SERPL W/O P-5'-P-CCNC: 25 U/L (ref 10–44)
ALT SERPL W/O P-5'-P-CCNC: 26 U/L (ref 10–44)
ALT SERPL W/O P-5'-P-CCNC: 37 U/L (ref 10–44)
ALT SERPL W/O P-5'-P-CCNC: 38 U/L (ref 10–44)
ALT SERPL W/O P-5'-P-CCNC: 38 U/L (ref 10–44)
ALT SERPL W/O P-5'-P-CCNC: 39 U/L (ref 10–44)
ALT SERPL W/O P-5'-P-CCNC: 40 U/L (ref 10–44)
ALT SERPL W/O P-5'-P-CCNC: 49 U/L (ref 10–44)
ANION GAP SERPL CALC-SCNC: 10 MMOL/L (ref 8–16)
ANION GAP SERPL CALC-SCNC: 12 MMOL/L (ref 8–16)
ANION GAP SERPL CALC-SCNC: 6 MMOL/L (ref 8–16)
ANION GAP SERPL CALC-SCNC: 7 MMOL/L (ref 8–16)
ANION GAP SERPL CALC-SCNC: 8 MMOL/L (ref 8–16)
ANION GAP SERPL CALC-SCNC: 9 MMOL/L (ref 8–16)
APPEARANCE FLD: ABNORMAL
APTT PPP: 37.7 SEC (ref 25.6–35.8)
AST SERPL-CCNC: 22 U/L (ref 10–40)
AST SERPL-CCNC: 25 U/L (ref 10–40)
AST SERPL-CCNC: 29 U/L (ref 10–40)
AST SERPL-CCNC: 32 U/L (ref 10–40)
AST SERPL-CCNC: 35 U/L (ref 10–40)
AST SERPL-CCNC: 36 U/L (ref 10–40)
AST SERPL-CCNC: 38 U/L (ref 10–40)
AST SERPL-CCNC: 42 U/L (ref 10–40)
AST SERPL-CCNC: 45 U/L (ref 10–40)
BACTERIA #/AREA URNS HPF: ABNORMAL /HPF
BACTERIA #/AREA URNS HPF: ABNORMAL /HPF
BACTERIA FLD AEROBE CULT: NO GROWTH
BACTERIA UR CULT: ABNORMAL
BACTERIA UR CULT: ABNORMAL
BASOPHILS # BLD AUTO: 0.07 K/UL (ref 0–0.2)
BASOPHILS # BLD AUTO: 0.07 K/UL (ref 0–0.2)
BASOPHILS # BLD AUTO: 0.08 K/UL (ref 0–0.2)
BASOPHILS # BLD AUTO: 0.1 K/UL (ref 0–0.2)
BASOPHILS # BLD AUTO: 0.11 K/UL (ref 0–0.2)
BASOPHILS # BLD AUTO: 0.12 K/UL (ref 0–0.2)
BASOPHILS # BLD AUTO: 0.12 K/UL (ref 0–0.2)
BASOPHILS NFR BLD: 0.6 % (ref 0–1.9)
BASOPHILS NFR BLD: 0.7 % (ref 0–1.9)
BASOPHILS NFR BLD: 0.8 % (ref 0–1.9)
BASOPHILS NFR BLD: 0.9 % (ref 0–1.9)
BASOPHILS NFR BLD: 1.1 % (ref 0–1.9)
BASOPHILS NFR BLD: 1.8 % (ref 0–1.9)
BILIRUB SERPL-MCNC: 0.6 MG/DL (ref 0.1–1)
BILIRUB SERPL-MCNC: 0.7 MG/DL (ref 0.1–1)
BILIRUB SERPL-MCNC: 0.7 MG/DL (ref 0.1–1)
BILIRUB SERPL-MCNC: 0.8 MG/DL (ref 0.1–1)
BILIRUB SERPL-MCNC: 0.8 MG/DL (ref 0.1–1)
BILIRUB SERPL-MCNC: 0.9 MG/DL (ref 0.1–1)
BILIRUB SERPL-MCNC: 1 MG/DL (ref 0.1–1)
BILIRUB SERPL-MCNC: 1 MG/DL (ref 0.1–1)
BILIRUB UR QL STRIP: NEGATIVE
BLD GP AB SCN CELLS X3 SERPL QL: NORMAL
BLD GP AB SCN CELLS X3 SERPL QL: NORMAL
BLD PROD TYP BPU: NORMAL
BLOOD UNIT EXPIRATION DATE: NORMAL
BLOOD UNIT TYPE CODE: 6200
BLOOD UNIT TYPE: NORMAL
BODY FLD TYPE: ABNORMAL
BODY FLUID COMMENTS: ABNORMAL
BUN SERPL-MCNC: 10 MG/DL (ref 8–23)
BUN SERPL-MCNC: 11 MG/DL (ref 8–23)
BUN SERPL-MCNC: 11 MG/DL (ref 8–23)
BUN SERPL-MCNC: 12 MG/DL (ref 8–23)
BUN SERPL-MCNC: 14 MG/DL (ref 8–23)
BUN SERPL-MCNC: 6 MG/DL (ref 8–23)
BUN SERPL-MCNC: 7 MG/DL (ref 8–23)
BUN SERPL-MCNC: 8 MG/DL (ref 8–23)
BUN SERPL-MCNC: 8 MG/DL (ref 8–23)
BUN SERPL-MCNC: 9 MG/DL (ref 8–23)
BUN SERPL-MCNC: 9 MG/DL (ref 8–23)
CALCIUM SERPL-MCNC: 8.2 MG/DL (ref 8.7–10.5)
CALCIUM SERPL-MCNC: 8.2 MG/DL (ref 8.7–10.5)
CALCIUM SERPL-MCNC: 8.4 MG/DL (ref 8.7–10.5)
CALCIUM SERPL-MCNC: 8.4 MG/DL (ref 8.7–10.5)
CALCIUM SERPL-MCNC: 8.5 MG/DL (ref 8.7–10.5)
CALCIUM SERPL-MCNC: 8.5 MG/DL (ref 8.7–10.5)
CALCIUM SERPL-MCNC: 8.6 MG/DL (ref 8.7–10.5)
CALCIUM SERPL-MCNC: 8.7 MG/DL (ref 8.7–10.5)
CALCIUM SERPL-MCNC: 8.7 MG/DL (ref 8.7–10.5)
CALCIUM SERPL-MCNC: 8.8 MG/DL (ref 8.7–10.5)
CALCIUM SERPL-MCNC: 9.1 MG/DL (ref 8.7–10.5)
CHLORIDE SERPL-SCNC: 101 MMOL/L (ref 95–110)
CHLORIDE SERPL-SCNC: 105 MMOL/L (ref 95–110)
CHLORIDE SERPL-SCNC: 105 MMOL/L (ref 95–110)
CHLORIDE SERPL-SCNC: 96 MMOL/L (ref 95–110)
CHLORIDE SERPL-SCNC: 97 MMOL/L (ref 95–110)
CHLORIDE SERPL-SCNC: 98 MMOL/L (ref 95–110)
CHLORIDE SERPL-SCNC: 99 MMOL/L (ref 95–110)
CLARITY UR: ABNORMAL
CLARITY UR: ABNORMAL
CLARITY UR: CLEAR
CO2 SERPL-SCNC: 26 MMOL/L (ref 23–29)
CO2 SERPL-SCNC: 27 MMOL/L (ref 23–29)
CO2 SERPL-SCNC: 28 MMOL/L (ref 23–29)
CO2 SERPL-SCNC: 28 MMOL/L (ref 23–29)
CO2 SERPL-SCNC: 29 MMOL/L (ref 23–29)
CO2 SERPL-SCNC: 30 MMOL/L (ref 23–29)
CO2 SERPL-SCNC: 32 MMOL/L (ref 23–29)
CODING SYSTEM: NORMAL
COLOR FLD: YELLOW
COLOR UR: YELLOW
CREAT SERPL-MCNC: 0.4 MG/DL (ref 0.5–1.4)
CREAT SERPL-MCNC: 0.5 MG/DL (ref 0.5–1.4)
CREAT SERPL-MCNC: 0.6 MG/DL (ref 0.5–1.4)
CREAT SERPL-MCNC: 0.8 MG/DL (ref 0.5–1.4)
CREAT SERPL-MCNC: 0.8 MG/DL (ref 0.5–1.4)
DIFFERENTIAL METHOD: ABNORMAL
DISPENSE STATUS: NORMAL
EOSINOPHIL # BLD AUTO: 0.1 K/UL (ref 0–0.5)
EOSINOPHIL # BLD AUTO: 0.2 K/UL (ref 0–0.5)
EOSINOPHIL # BLD AUTO: 0.2 K/UL (ref 0–0.5)
EOSINOPHIL # BLD AUTO: 0.3 K/UL (ref 0–0.5)
EOSINOPHIL # BLD AUTO: 0.4 K/UL (ref 0–0.5)
EOSINOPHIL NFR BLD: 0.9 % (ref 0–8)
EOSINOPHIL NFR BLD: 2.1 % (ref 0–8)
EOSINOPHIL NFR BLD: 2.3 % (ref 0–8)
EOSINOPHIL NFR BLD: 2.3 % (ref 0–8)
EOSINOPHIL NFR BLD: 2.5 % (ref 0–8)
EOSINOPHIL NFR BLD: 2.9 % (ref 0–8)
EOSINOPHIL NFR BLD: 3.2 % (ref 0–8)
EOSINOPHIL NFR BLD: 4 % (ref 0–8)
EOSINOPHIL NFR BLD: 4.1 % (ref 0–8)
EOSINOPHIL NFR BLD: 4.2 % (ref 0–8)
ERYTHROCYTE [DISTWIDTH] IN BLOOD BY AUTOMATED COUNT: 14.6 % (ref 11.5–14.5)
ERYTHROCYTE [DISTWIDTH] IN BLOOD BY AUTOMATED COUNT: 14.7 % (ref 11.5–14.5)
ERYTHROCYTE [DISTWIDTH] IN BLOOD BY AUTOMATED COUNT: 14.7 % (ref 11.5–14.5)
ERYTHROCYTE [DISTWIDTH] IN BLOOD BY AUTOMATED COUNT: 14.9 % (ref 11.5–14.5)
ERYTHROCYTE [DISTWIDTH] IN BLOOD BY AUTOMATED COUNT: 15 % (ref 11.5–14.5)
ERYTHROCYTE [DISTWIDTH] IN BLOOD BY AUTOMATED COUNT: 15 % (ref 11.5–14.5)
ERYTHROCYTE [DISTWIDTH] IN BLOOD BY AUTOMATED COUNT: 15.2 % (ref 11.5–14.5)
ERYTHROCYTE [DISTWIDTH] IN BLOOD BY AUTOMATED COUNT: 18.1 % (ref 11.5–14.5)
EST. GFR  (AFRICAN AMERICAN): >60 ML/MIN/1.73 M^2
EST. GFR  (NON AFRICAN AMERICAN): >60 ML/MIN/1.73 M^2
GLUCOSE SERPL-MCNC: 107 MG/DL (ref 70–110)
GLUCOSE SERPL-MCNC: 109 MG/DL (ref 70–110)
GLUCOSE SERPL-MCNC: 113 MG/DL (ref 70–110)
GLUCOSE SERPL-MCNC: 122 MG/DL (ref 70–110)
GLUCOSE SERPL-MCNC: 127 MG/DL (ref 70–110)
GLUCOSE SERPL-MCNC: 128 MG/DL (ref 70–110)
GLUCOSE SERPL-MCNC: 129 MG/DL (ref 70–110)
GLUCOSE SERPL-MCNC: 135 MG/DL (ref 70–110)
GLUCOSE SERPL-MCNC: 137 MG/DL (ref 70–110)
GLUCOSE SERPL-MCNC: 143 MG/DL (ref 70–110)
GLUCOSE SERPL-MCNC: 146 MG/DL (ref 70–110)
GLUCOSE UR QL STRIP: NEGATIVE
GRAM STN SPEC: NORMAL
GRAM STN SPEC: NORMAL
HCT VFR BLD AUTO: 33.6 % (ref 40–54)
HCT VFR BLD AUTO: 35.2 % (ref 40–54)
HCT VFR BLD AUTO: 36 % (ref 40–54)
HCT VFR BLD AUTO: 37.8 % (ref 40–54)
HCT VFR BLD AUTO: 38.8 % (ref 40–54)
HCT VFR BLD AUTO: 38.9 % (ref 40–54)
HCT VFR BLD AUTO: 39 % (ref 40–54)
HCT VFR BLD AUTO: 39.3 % (ref 40–54)
HCT VFR BLD AUTO: 40.1 % (ref 40–54)
HCT VFR BLD AUTO: 42.6 % (ref 40–54)
HCT VFR BLD AUTO: 44.4 % (ref 40–54)
HGB BLD-MCNC: 10.5 G/DL (ref 14–18)
HGB BLD-MCNC: 11.3 G/DL (ref 14–18)
HGB BLD-MCNC: 11.4 G/DL (ref 14–18)
HGB BLD-MCNC: 11.7 G/DL (ref 14–18)
HGB BLD-MCNC: 11.8 G/DL (ref 14–18)
HGB BLD-MCNC: 12.2 G/DL (ref 14–18)
HGB BLD-MCNC: 12.9 G/DL (ref 14–18)
HGB BLD-MCNC: 13.3 G/DL (ref 14–18)
HGB BLD-MCNC: 9.7 G/DL (ref 14–18)
HGB UR QL STRIP: ABNORMAL
HGB UR QL STRIP: NEGATIVE
HGB UR QL STRIP: NEGATIVE
HYALINE CASTS #/AREA URNS LPF: 11 /LPF
HYALINE CASTS #/AREA URNS LPF: 69 /LPF
IMM GRANULOCYTES # BLD AUTO: 0.01 K/UL (ref 0–0.04)
IMM GRANULOCYTES # BLD AUTO: 0.01 K/UL (ref 0–0.04)
IMM GRANULOCYTES # BLD AUTO: 0.03 K/UL (ref 0–0.04)
IMM GRANULOCYTES # BLD AUTO: 0.05 K/UL (ref 0–0.04)
IMM GRANULOCYTES # BLD AUTO: 0.06 K/UL (ref 0–0.04)
IMM GRANULOCYTES NFR BLD AUTO: 0.1 % (ref 0–0.5)
IMM GRANULOCYTES NFR BLD AUTO: 0.2 % (ref 0–0.5)
IMM GRANULOCYTES NFR BLD AUTO: 0.3 % (ref 0–0.5)
IMM GRANULOCYTES NFR BLD AUTO: 0.4 % (ref 0–0.5)
IMM GRANULOCYTES NFR BLD AUTO: 0.5 % (ref 0–0.5)
IMM GRANULOCYTES NFR BLD AUTO: 0.5 % (ref 0–0.5)
INR PPP: 1.3
KETONES UR QL STRIP: NEGATIVE
LEUKOCYTE ESTERASE UR QL STRIP: ABNORMAL
LEUKOCYTE ESTERASE UR QL STRIP: ABNORMAL
LEUKOCYTE ESTERASE UR QL STRIP: NEGATIVE
LIPASE SERPL-CCNC: 21 U/L (ref 4–60)
LIPASE SERPL-CCNC: 25 U/L (ref 4–60)
LYMPHOCYTES # BLD AUTO: 0.9 K/UL (ref 1–4.8)
LYMPHOCYTES # BLD AUTO: 1.1 K/UL (ref 1–4.8)
LYMPHOCYTES # BLD AUTO: 1.1 K/UL (ref 1–4.8)
LYMPHOCYTES # BLD AUTO: 1.2 K/UL (ref 1–4.8)
LYMPHOCYTES # BLD AUTO: 1.3 K/UL (ref 1–4.8)
LYMPHOCYTES # BLD AUTO: 1.4 K/UL (ref 1–4.8)
LYMPHOCYTES # BLD AUTO: 1.5 K/UL (ref 1–4.8)
LYMPHOCYTES # BLD AUTO: 1.7 K/UL (ref 1–4.8)
LYMPHOCYTES NFR BLD: 10.6 % (ref 18–48)
LYMPHOCYTES NFR BLD: 10.7 % (ref 18–48)
LYMPHOCYTES NFR BLD: 11.7 % (ref 18–48)
LYMPHOCYTES NFR BLD: 12.4 % (ref 18–48)
LYMPHOCYTES NFR BLD: 12.5 % (ref 18–48)
LYMPHOCYTES NFR BLD: 13.2 % (ref 18–48)
LYMPHOCYTES NFR BLD: 13.3 % (ref 18–48)
LYMPHOCYTES NFR BLD: 14.5 % (ref 18–48)
LYMPHOCYTES NFR BLD: 15.2 % (ref 18–48)
LYMPHOCYTES NFR BLD: 19 % (ref 18–48)
LYMPHOCYTES NFR FLD MANUAL: 50 %
MAGNESIUM SERPL-MCNC: 1.4 MG/DL (ref 1.6–2.6)
MAGNESIUM SERPL-MCNC: 1.5 MG/DL (ref 1.6–2.6)
MAGNESIUM SERPL-MCNC: 1.7 MG/DL (ref 1.6–2.6)
MAGNESIUM SERPL-MCNC: 1.8 MG/DL (ref 1.6–2.6)
MAGNESIUM SERPL-MCNC: 1.9 MG/DL (ref 1.6–2.6)
MCH RBC QN AUTO: 25 PG (ref 27–31)
MCH RBC QN AUTO: 25.2 PG (ref 27–31)
MCH RBC QN AUTO: 25.3 PG (ref 27–31)
MCH RBC QN AUTO: 25.4 PG (ref 27–31)
MCH RBC QN AUTO: 25.7 PG (ref 27–31)
MCH RBC QN AUTO: 25.7 PG (ref 27–31)
MCH RBC QN AUTO: 25.8 PG (ref 27–31)
MCH RBC QN AUTO: 25.8 PG (ref 27–31)
MCH RBC QN AUTO: 26.3 PG (ref 27–31)
MCH RBC QN AUTO: 26.8 PG (ref 27–31)
MCH RBC QN AUTO: 26.8 PG (ref 27–31)
MCHC RBC AUTO-ENTMCNC: 28.9 G/DL (ref 32–36)
MCHC RBC AUTO-ENTMCNC: 29.4 G/DL (ref 32–36)
MCHC RBC AUTO-ENTMCNC: 29.8 G/DL (ref 32–36)
MCHC RBC AUTO-ENTMCNC: 29.8 G/DL (ref 32–36)
MCHC RBC AUTO-ENTMCNC: 30 G/DL (ref 32–36)
MCHC RBC AUTO-ENTMCNC: 30 G/DL (ref 32–36)
MCHC RBC AUTO-ENTMCNC: 30.3 G/DL (ref 32–36)
MCHC RBC AUTO-ENTMCNC: 30.3 G/DL (ref 32–36)
MCHC RBC AUTO-ENTMCNC: 30.4 G/DL (ref 32–36)
MCHC RBC AUTO-ENTMCNC: 31 G/DL (ref 32–36)
MCHC RBC AUTO-ENTMCNC: 31.4 G/DL (ref 32–36)
MCV RBC AUTO: 84 FL (ref 82–98)
MCV RBC AUTO: 85 FL (ref 82–98)
MCV RBC AUTO: 86 FL (ref 82–98)
MCV RBC AUTO: 87 FL (ref 82–98)
MCV RBC AUTO: 90 FL (ref 82–98)
MESOTHL CELL NFR FLD MANUAL: 21 %
MICROSCOPIC COMMENT: ABNORMAL
MICROSCOPIC COMMENT: ABNORMAL
MONOCYTES # BLD AUTO: 0.3 K/UL (ref 0.3–1)
MONOCYTES # BLD AUTO: 0.7 K/UL (ref 0.3–1)
MONOCYTES # BLD AUTO: 0.8 K/UL (ref 0.3–1)
MONOCYTES # BLD AUTO: 0.9 K/UL (ref 0.3–1)
MONOCYTES # BLD AUTO: 0.9 K/UL (ref 0.3–1)
MONOCYTES # BLD AUTO: 1 K/UL (ref 0.3–1)
MONOCYTES # BLD AUTO: 1 K/UL (ref 0.3–1)
MONOCYTES # BLD AUTO: 1.1 K/UL (ref 0.3–1)
MONOCYTES NFR BLD: 14.3 % (ref 4–15)
MONOCYTES NFR BLD: 4.2 % (ref 4–15)
MONOCYTES NFR BLD: 6.6 % (ref 4–15)
MONOCYTES NFR BLD: 6.7 % (ref 4–15)
MONOCYTES NFR BLD: 7.4 % (ref 4–15)
MONOCYTES NFR BLD: 7.7 % (ref 4–15)
MONOCYTES NFR BLD: 8.5 % (ref 4–15)
MONOCYTES NFR BLD: 8.7 % (ref 4–15)
MONOCYTES NFR BLD: 9 % (ref 4–15)
MONOCYTES NFR BLD: 9.1 % (ref 4–15)
MONOS+MACROS NFR FLD MANUAL: 18 %
NEUTROPHILS # BLD AUTO: 4.1 K/UL (ref 1.8–7.7)
NEUTROPHILS # BLD AUTO: 4.9 K/UL (ref 1.8–7.7)
NEUTROPHILS # BLD AUTO: 6.5 K/UL (ref 1.8–7.7)
NEUTROPHILS # BLD AUTO: 7 K/UL (ref 1.8–7.7)
NEUTROPHILS # BLD AUTO: 7.2 K/UL (ref 1.8–7.7)
NEUTROPHILS # BLD AUTO: 7.5 K/UL (ref 1.8–7.7)
NEUTROPHILS # BLD AUTO: 7.8 K/UL (ref 1.8–7.7)
NEUTROPHILS # BLD AUTO: 8.7 K/UL (ref 1.8–7.7)
NEUTROPHILS # BLD AUTO: 9.1 K/UL (ref 1.8–7.7)
NEUTROPHILS # BLD AUTO: 9.9 K/UL (ref 1.8–7.7)
NEUTROPHILS NFR BLD: 60.3 % (ref 38–73)
NEUTROPHILS NFR BLD: 71 % (ref 38–73)
NEUTROPHILS NFR BLD: 73.8 % (ref 38–73)
NEUTROPHILS NFR BLD: 74.4 % (ref 38–73)
NEUTROPHILS NFR BLD: 75.1 % (ref 38–73)
NEUTROPHILS NFR BLD: 75.6 % (ref 38–73)
NEUTROPHILS NFR BLD: 77.1 % (ref 38–73)
NEUTROPHILS NFR BLD: 77.2 % (ref 38–73)
NEUTROPHILS NFR BLD: 78 % (ref 38–73)
NEUTROPHILS NFR BLD: 80.8 % (ref 38–73)
NEUTROPHILS NFR FLD MANUAL: 8 %
NITRITE UR QL STRIP: NEGATIVE
NITRITE UR QL STRIP: NEGATIVE
NITRITE UR QL STRIP: POSITIVE
NRBC BLD-RTO: 0 /100 WBC
NUM UNITS TRANS PACKED RBC: NORMAL
OTHER CELLS FLD MANUAL: 3 %
PH UR STRIP: 7 [PH] (ref 5–8)
PH UR STRIP: 7 [PH] (ref 5–8)
PH UR STRIP: 8 [PH] (ref 5–8)
PHOSPHATE SERPL-MCNC: 3 MG/DL (ref 2.7–4.5)
PHOSPHATE SERPL-MCNC: 3.4 MG/DL (ref 2.7–4.5)
PHOSPHATE SERPL-MCNC: 3.5 MG/DL (ref 2.7–4.5)
PHOSPHATE SERPL-MCNC: 3.8 MG/DL (ref 2.7–4.5)
PLATELET # BLD AUTO: 190 K/UL (ref 150–450)
PLATELET # BLD AUTO: 195 K/UL (ref 150–450)
PLATELET # BLD AUTO: 232 K/UL (ref 150–450)
PLATELET # BLD AUTO: 234 K/UL (ref 150–450)
PLATELET # BLD AUTO: 237 K/UL (ref 150–450)
PLATELET # BLD AUTO: 257 K/UL (ref 150–450)
PLATELET # BLD AUTO: 259 K/UL (ref 150–450)
PLATELET # BLD AUTO: 287 K/UL (ref 150–450)
PLATELET # BLD AUTO: 331 K/UL (ref 150–450)
PLATELET # BLD AUTO: 366 K/UL (ref 150–450)
PLATELET # BLD AUTO: 512 K/UL (ref 150–450)
PLATELET BLD QL SMEAR: ABNORMAL
PLATELET BLD QL SMEAR: ABNORMAL
PMV BLD AUTO: 10.1 FL (ref 9.2–12.9)
PMV BLD AUTO: 10.3 FL (ref 9.2–12.9)
PMV BLD AUTO: 10.4 FL (ref 9.2–12.9)
PMV BLD AUTO: 10.5 FL (ref 9.2–12.9)
PMV BLD AUTO: 10.6 FL (ref 9.2–12.9)
PMV BLD AUTO: 10.6 FL (ref 9.2–12.9)
PMV BLD AUTO: 10.9 FL (ref 9.2–12.9)
PMV BLD AUTO: 11 FL (ref 9.2–12.9)
PMV BLD AUTO: 9.5 FL (ref 9.2–12.9)
PMV BLD AUTO: 9.6 FL (ref 9.2–12.9)
PMV BLD AUTO: 9.7 FL (ref 9.2–12.9)
POTASSIUM SERPL-SCNC: 3 MMOL/L (ref 3.5–5.1)
POTASSIUM SERPL-SCNC: 3.1 MMOL/L (ref 3.5–5.1)
POTASSIUM SERPL-SCNC: 3.2 MMOL/L (ref 3.5–5.1)
POTASSIUM SERPL-SCNC: 3.4 MMOL/L (ref 3.5–5.1)
POTASSIUM SERPL-SCNC: 3.5 MMOL/L (ref 3.5–5.1)
POTASSIUM SERPL-SCNC: 3.5 MMOL/L (ref 3.5–5.1)
POTASSIUM SERPL-SCNC: 3.6 MMOL/L (ref 3.5–5.1)
POTASSIUM SERPL-SCNC: 3.6 MMOL/L (ref 3.5–5.1)
POTASSIUM SERPL-SCNC: 3.8 MMOL/L (ref 3.5–5.1)
POTASSIUM SERPL-SCNC: 3.8 MMOL/L (ref 3.5–5.1)
POTASSIUM SERPL-SCNC: 4 MMOL/L (ref 3.5–5.1)
PROT SERPL-MCNC: 5.8 G/DL (ref 6–8.4)
PROT SERPL-MCNC: 5.9 G/DL (ref 6–8.4)
PROT SERPL-MCNC: 6.2 G/DL (ref 6–8.4)
PROT SERPL-MCNC: 6.2 G/DL (ref 6–8.4)
PROT SERPL-MCNC: 6.8 G/DL (ref 6–8.4)
PROT SERPL-MCNC: 7 G/DL (ref 6–8.4)
PROT SERPL-MCNC: 7.1 G/DL (ref 6–8.4)
PROT UR QL STRIP: ABNORMAL
PROTHROMBIN TIME: 15.2 SEC (ref 11.8–14.3)
RBC # BLD AUTO: 3.88 M/UL (ref 4.6–6.2)
RBC # BLD AUTO: 3.92 M/UL (ref 4.6–6.2)
RBC # BLD AUTO: 4.22 M/UL (ref 4.6–6.2)
RBC # BLD AUTO: 4.45 M/UL (ref 4.6–6.2)
RBC # BLD AUTO: 4.53 M/UL (ref 4.6–6.2)
RBC # BLD AUTO: 4.56 M/UL (ref 4.6–6.2)
RBC # BLD AUTO: 4.58 M/UL (ref 4.6–6.2)
RBC # BLD AUTO: 4.62 M/UL (ref 4.6–6.2)
RBC # BLD AUTO: 4.8 M/UL (ref 4.6–6.2)
RBC # BLD AUTO: 5 M/UL (ref 4.6–6.2)
RBC # BLD AUTO: 5.18 M/UL (ref 4.6–6.2)
RBC #/AREA URNS HPF: 0 /HPF (ref 0–4)
RBC #/AREA URNS HPF: 1 /HPF (ref 0–4)
SAMPLE: NORMAL
SODIUM SERPL-SCNC: 134 MMOL/L (ref 136–145)
SODIUM SERPL-SCNC: 134 MMOL/L (ref 136–145)
SODIUM SERPL-SCNC: 135 MMOL/L (ref 136–145)
SODIUM SERPL-SCNC: 135 MMOL/L (ref 136–145)
SODIUM SERPL-SCNC: 136 MMOL/L (ref 136–145)
SODIUM SERPL-SCNC: 137 MMOL/L (ref 136–145)
SODIUM SERPL-SCNC: 138 MMOL/L (ref 136–145)
SODIUM SERPL-SCNC: 139 MMOL/L (ref 136–145)
SP GR UR STRIP: 1.01 (ref 1–1.03)
SP GR UR STRIP: 1.02 (ref 1–1.03)
SP GR UR STRIP: 1.02 (ref 1–1.03)
SQUAMOUS #/AREA URNS HPF: 0 /HPF
SQUAMOUS #/AREA URNS HPF: 7 /HPF
TROPONIN I SERPL DL<=0.01 NG/ML-MCNC: <0.03 NG/ML
URN SPEC COLLECT METH UR: ABNORMAL
UROBILINOGEN UR STRIP-ACNC: >=8 EU/DL
UROBILINOGEN UR STRIP-ACNC: ABNORMAL EU/DL
UROBILINOGEN UR STRIP-ACNC: ABNORMAL EU/DL
WBC # BLD AUTO: 10.35 K/UL (ref 3.9–12.7)
WBC # BLD AUTO: 10.83 K/UL (ref 3.9–12.7)
WBC # BLD AUTO: 11.81 K/UL (ref 3.9–12.7)
WBC # BLD AUTO: 13.09 K/UL (ref 3.9–12.7)
WBC # BLD AUTO: 6.39 K/UL (ref 3.9–12.7)
WBC # BLD AUTO: 6.85 K/UL (ref 3.9–12.7)
WBC # BLD AUTO: 7.45 K/UL (ref 3.9–12.7)
WBC # BLD AUTO: 8.73 K/UL (ref 3.9–12.7)
WBC # BLD AUTO: 9.66 K/UL (ref 3.9–12.7)
WBC # BLD AUTO: 9.76 K/UL (ref 3.9–12.7)
WBC # BLD AUTO: 9.9 K/UL (ref 3.9–12.7)
WBC # FLD: 269 /CU MM
WBC #/AREA URNS HPF: 27 /HPF (ref 0–5)
WBC #/AREA URNS HPF: 32 /HPF (ref 0–5)

## 2021-01-01 PROCEDURE — 99495 TCM SERVICES (MODERATE COMPLEXITY): ICD-10-PCS | Mod: S$GLB,,, | Performed by: NURSE PRACTITIONER

## 2021-01-01 PROCEDURE — 25500020 PHARM REV CODE 255: Performed by: INTERNAL MEDICINE

## 2021-01-01 PROCEDURE — 25000003 PHARM REV CODE 250: Performed by: HOSPITALIST

## 2021-01-01 PROCEDURE — 99213 PR OFFICE/OUTPT VISIT, EST, LEVL III, 20-29 MIN: ICD-10-PCS | Mod: S$GLB,,, | Performed by: INTERNAL MEDICINE

## 2021-01-01 PROCEDURE — 36415 COLL VENOUS BLD VENIPUNCTURE: CPT | Performed by: HOSPITALIST

## 2021-01-01 PROCEDURE — 99213 PR OFFICE/OUTPT VISIT, EST, LEVL III, 20-29 MIN: ICD-10-PCS | Mod: S$PBB,,, | Performed by: NURSE PRACTITIONER

## 2021-01-01 PROCEDURE — 96372 THER/PROPH/DIAG INJ SC/IM: CPT

## 2021-01-01 PROCEDURE — 96367 TX/PROPH/DG ADDL SEQ IV INF: CPT

## 2021-01-01 PROCEDURE — 99215 PR OFFICE/OUTPT VISIT, EST, LEVL V, 40-54 MIN: ICD-10-PCS | Mod: S$GLB,,, | Performed by: NURSE PRACTITIONER

## 2021-01-01 PROCEDURE — 63600175 PHARM REV CODE 636 W HCPCS: Performed by: INTERNAL MEDICINE

## 2021-01-01 PROCEDURE — 96413 CHEMO IV INFUSION 1 HR: CPT

## 2021-01-01 PROCEDURE — 63600175 PHARM REV CODE 636 W HCPCS: Performed by: EMERGENCY MEDICINE

## 2021-01-01 PROCEDURE — 27000221 HC OXYGEN, UP TO 24 HOURS

## 2021-01-01 PROCEDURE — C9113 INJ PANTOPRAZOLE SODIUM, VIA: HCPCS | Performed by: EMERGENCY MEDICINE

## 2021-01-01 PROCEDURE — 80053 COMPREHEN METABOLIC PANEL: CPT | Performed by: STUDENT IN AN ORGANIZED HEALTH CARE EDUCATION/TRAINING PROGRAM

## 2021-01-01 PROCEDURE — 99900035 HC TECH TIME PER 15 MIN (STAT)

## 2021-01-01 PROCEDURE — 27000673 HC TUBING BLOOD Y: Performed by: ANESTHESIOLOGY

## 2021-01-01 PROCEDURE — 25000003 PHARM REV CODE 250: Performed by: ANESTHESIOLOGY

## 2021-01-01 PROCEDURE — 12000002 HC ACUTE/MED SURGE SEMI-PRIVATE ROOM

## 2021-01-01 PROCEDURE — 96417 CHEMO IV INFUS EACH ADDL SEQ: CPT

## 2021-01-01 PROCEDURE — 25000003 PHARM REV CODE 250: Performed by: INTERNAL MEDICINE

## 2021-01-01 PROCEDURE — A4216 STERILE WATER/SALINE, 10 ML: HCPCS | Performed by: INTERNAL MEDICINE

## 2021-01-01 PROCEDURE — 43235 EGD DIAGNOSTIC BRUSH WASH: CPT | Performed by: INTERNAL MEDICINE

## 2021-01-01 PROCEDURE — 84100 ASSAY OF PHOSPHORUS: CPT | Performed by: HOSPITALIST

## 2021-01-01 PROCEDURE — 99214 PR OFFICE/OUTPT VISIT, EST, LEVL IV, 30-39 MIN: ICD-10-PCS | Mod: S$GLB,,, | Performed by: INTERNAL MEDICINE

## 2021-01-01 PROCEDURE — 94761 N-INVAS EAR/PLS OXIMETRY MLT: CPT

## 2021-01-01 PROCEDURE — 51702 INSERT TEMP BLADDER CATH: CPT

## 2021-01-01 PROCEDURE — 85025 COMPLETE CBC W/AUTO DIFF WBC: CPT | Performed by: EMERGENCY MEDICINE

## 2021-01-01 PROCEDURE — 96376 TX/PRO/DX INJ SAME DRUG ADON: CPT

## 2021-01-01 PROCEDURE — 80053 COMPREHEN METABOLIC PANEL: CPT | Performed by: HOSPITALIST

## 2021-01-01 PROCEDURE — C1769 GUIDE WIRE: HCPCS | Performed by: INTERNAL MEDICINE

## 2021-01-01 PROCEDURE — 96365 THER/PROPH/DIAG IV INF INIT: CPT

## 2021-01-01 PROCEDURE — 27202125 HC BALLOON, EXTRACTION (ANY): Performed by: INTERNAL MEDICINE

## 2021-01-01 PROCEDURE — 96374 THER/PROPH/DIAG INJ IV PUSH: CPT | Mod: 59

## 2021-01-01 PROCEDURE — 86920 COMPATIBILITY TEST SPIN: CPT | Mod: 59

## 2021-01-01 PROCEDURE — 99213 OFFICE O/P EST LOW 20 MIN: CPT | Mod: S$GLB,,, | Performed by: INTERNAL MEDICINE

## 2021-01-01 PROCEDURE — 63600175 PHARM REV CODE 636 W HCPCS: Performed by: NURSE ANESTHETIST, CERTIFIED REGISTERED

## 2021-01-01 PROCEDURE — 96375 TX/PRO/DX INJ NEW DRUG ADDON: CPT

## 2021-01-01 PROCEDURE — 87070 CULTURE OTHR SPECIMN AEROBIC: CPT | Performed by: INTERNAL MEDICINE

## 2021-01-01 PROCEDURE — 25000003 PHARM REV CODE 250: Performed by: STUDENT IN AN ORGANIZED HEALTH CARE EDUCATION/TRAINING PROGRAM

## 2021-01-01 PROCEDURE — 87086 URINE CULTURE/COLONY COUNT: CPT | Performed by: HOSPITALIST

## 2021-01-01 PROCEDURE — 99214 PR OFFICE/OUTPT VISIT, EST, LEVL IV, 30-39 MIN: ICD-10-PCS | Mod: S$GLB,,, | Performed by: FAMILY MEDICINE

## 2021-01-01 PROCEDURE — 36415 COLL VENOUS BLD VENIPUNCTURE: CPT | Performed by: INTERNAL MEDICINE

## 2021-01-01 PROCEDURE — 86900 BLOOD TYPING SEROLOGIC ABO: CPT | Performed by: INTERNAL MEDICINE

## 2021-01-01 PROCEDURE — 63600175 PHARM REV CODE 636 W HCPCS: Mod: EA | Performed by: INTERNAL MEDICINE

## 2021-01-01 PROCEDURE — 99214 OFFICE O/P EST MOD 30 MIN: CPT | Mod: S$GLB,,, | Performed by: INTERNAL MEDICINE

## 2021-01-01 PROCEDURE — 99223 PR INITIAL HOSPITAL CARE,LEVL III: ICD-10-PCS | Mod: ,,, | Performed by: STUDENT IN AN ORGANIZED HEALTH CARE EDUCATION/TRAINING PROGRAM

## 2021-01-01 PROCEDURE — 87086 URINE CULTURE/COLONY COUNT: CPT | Performed by: INTERNAL MEDICINE

## 2021-01-01 PROCEDURE — 87147 CULTURE TYPE IMMUNOLOGIC: CPT | Performed by: HOSPITALIST

## 2021-01-01 PROCEDURE — C9113 INJ PANTOPRAZOLE SODIUM, VIA: HCPCS | Performed by: HOSPITALIST

## 2021-01-01 PROCEDURE — 63600175 PHARM REV CODE 636 W HCPCS: Performed by: NURSE PRACTITIONER

## 2021-01-01 PROCEDURE — 96361 HYDRATE IV INFUSION ADD-ON: CPT

## 2021-01-01 PROCEDURE — 36430 TRANSFUSION BLD/BLD COMPNT: CPT

## 2021-01-01 PROCEDURE — 80053 COMPREHEN METABOLIC PANEL: CPT | Performed by: INTERNAL MEDICINE

## 2021-01-01 PROCEDURE — 96374 THER/PROPH/DIAG INJ IV PUSH: CPT

## 2021-01-01 PROCEDURE — 63600175 PHARM REV CODE 636 W HCPCS: Performed by: HOSPITALIST

## 2021-01-01 PROCEDURE — 27201107 HC STYLET, STANDARD: Performed by: ANESTHESIOLOGY

## 2021-01-01 PROCEDURE — 85025 COMPLETE CBC W/AUTO DIFF WBC: CPT | Performed by: STUDENT IN AN ORGANIZED HEALTH CARE EDUCATION/TRAINING PROGRAM

## 2021-01-01 PROCEDURE — 85025 COMPLETE CBC W/AUTO DIFF WBC: CPT | Performed by: NURSE PRACTITIONER

## 2021-01-01 PROCEDURE — 44370 SMALL BOWEL ENDOSCOPY/STENT: CPT | Performed by: INTERNAL MEDICINE

## 2021-01-01 PROCEDURE — 99215 OFFICE O/P EST HI 40 MIN: CPT | Mod: S$GLB,,, | Performed by: NURSE PRACTITIONER

## 2021-01-01 PROCEDURE — 25500020 PHARM REV CODE 255: Mod: PO | Performed by: INTERNAL MEDICINE

## 2021-01-01 PROCEDURE — 89051 BODY FLUID CELL COUNT: CPT | Performed by: INTERNAL MEDICINE

## 2021-01-01 PROCEDURE — 25500020 PHARM REV CODE 255: Performed by: EMERGENCY MEDICINE

## 2021-01-01 PROCEDURE — 99214 OFFICE O/P EST MOD 30 MIN: CPT | Mod: S$GLB,,, | Performed by: FAMILY MEDICINE

## 2021-01-01 PROCEDURE — 96376 TX/PRO/DX INJ SAME DRUG ADON: CPT | Mod: 59

## 2021-01-01 PROCEDURE — 85027 COMPLETE CBC AUTOMATED: CPT | Performed by: INTERNAL MEDICINE

## 2021-01-01 PROCEDURE — 27000671 HC TUBING MICROBORE EXT: Performed by: ANESTHESIOLOGY

## 2021-01-01 PROCEDURE — 37000009 HC ANESTHESIA EA ADD 15 MINS: Performed by: INTERNAL MEDICINE

## 2021-01-01 PROCEDURE — C1876 STENT, NON-COA/NON-COV W/DEL: HCPCS | Performed by: INTERNAL MEDICINE

## 2021-01-01 PROCEDURE — 83690 ASSAY OF LIPASE: CPT | Performed by: EMERGENCY MEDICINE

## 2021-01-01 PROCEDURE — 83690 ASSAY OF LIPASE: CPT | Performed by: HOSPITALIST

## 2021-01-01 PROCEDURE — 96523 IRRIG DRUG DELIVERY DEVICE: CPT | Mod: XB

## 2021-01-01 PROCEDURE — 85025 COMPLETE CBC W/AUTO DIFF WBC: CPT | Performed by: INTERNAL MEDICINE

## 2021-01-01 PROCEDURE — 87147 CULTURE TYPE IMMUNOLOGIC: CPT | Performed by: INTERNAL MEDICINE

## 2021-01-01 PROCEDURE — 63600175 PHARM REV CODE 636 W HCPCS: Mod: JG | Performed by: INTERNAL MEDICINE

## 2021-01-01 PROCEDURE — 99223 1ST HOSP IP/OBS HIGH 75: CPT | Mod: ,,, | Performed by: STUDENT IN AN ORGANIZED HEALTH CARE EDUCATION/TRAINING PROGRAM

## 2021-01-01 PROCEDURE — P9016 RBC LEUKOCYTES REDUCED: HCPCS | Performed by: INTERNAL MEDICINE

## 2021-01-01 PROCEDURE — 63600175 PHARM REV CODE 636 W HCPCS: Performed by: ANESTHESIOLOGY

## 2021-01-01 PROCEDURE — 83735 ASSAY OF MAGNESIUM: CPT | Performed by: HOSPITALIST

## 2021-01-01 PROCEDURE — 99215 OFFICE O/P EST HI 40 MIN: CPT | Mod: PBBFAC | Performed by: NURSE PRACTITIONER

## 2021-01-01 PROCEDURE — 74178 CT ABD&PLV WO CNTR FLWD CNTR: CPT | Mod: TC

## 2021-01-01 PROCEDURE — 81001 URINALYSIS AUTO W/SCOPE: CPT | Performed by: INTERNAL MEDICINE

## 2021-01-01 PROCEDURE — 27000671 HC TUBING MICROBORE EXT: Performed by: STUDENT IN AN ORGANIZED HEALTH CARE EDUCATION/TRAINING PROGRAM

## 2021-01-01 PROCEDURE — 25000242 PHARM REV CODE 250 ALT 637 W/ HCPCS: Performed by: HOSPITALIST

## 2021-01-01 PROCEDURE — 27202177 HC INTRODUCER, TRACHEAL TUBE: Performed by: STUDENT IN AN ORGANIZED HEALTH CARE EDUCATION/TRAINING PROGRAM

## 2021-01-01 PROCEDURE — 99999 PR PBB SHADOW E&M-EST. PATIENT-LVL V: CPT | Mod: PBBFAC,,, | Performed by: NURSE PRACTITIONER

## 2021-01-01 PROCEDURE — 82565 ASSAY OF CREATININE: CPT | Mod: PO

## 2021-01-01 PROCEDURE — C1729 CATH, DRAINAGE: HCPCS

## 2021-01-01 PROCEDURE — 27000656 HC EYE GOGGLES: Performed by: ANESTHESIOLOGY

## 2021-01-01 PROCEDURE — 81001 URINALYSIS AUTO W/SCOPE: CPT | Performed by: HOSPITALIST

## 2021-01-01 PROCEDURE — 27202445 US GUIDED PARACENTESIS INC IMAGING

## 2021-01-01 PROCEDURE — 87205 SMEAR GRAM STAIN: CPT | Performed by: INTERNAL MEDICINE

## 2021-01-01 PROCEDURE — 85025 COMPLETE CBC W/AUTO DIFF WBC: CPT | Performed by: HOSPITALIST

## 2021-01-01 PROCEDURE — 83735 ASSAY OF MAGNESIUM: CPT | Performed by: STUDENT IN AN ORGANIZED HEALTH CARE EDUCATION/TRAINING PROGRAM

## 2021-01-01 PROCEDURE — 36415 COLL VENOUS BLD VENIPUNCTURE: CPT | Performed by: EMERGENCY MEDICINE

## 2021-01-01 PROCEDURE — 84100 ASSAY OF PHOSPHORUS: CPT | Performed by: NURSE PRACTITIONER

## 2021-01-01 PROCEDURE — 27000673 HC TUBING BLOOD Y: Performed by: STUDENT IN AN ORGANIZED HEALTH CARE EDUCATION/TRAINING PROGRAM

## 2021-01-01 PROCEDURE — 63600175 PHARM REV CODE 636 W HCPCS: Performed by: STUDENT IN AN ORGANIZED HEALTH CARE EDUCATION/TRAINING PROGRAM

## 2021-01-01 PROCEDURE — 83735 ASSAY OF MAGNESIUM: CPT | Performed by: INTERNAL MEDICINE

## 2021-01-01 PROCEDURE — 85610 PROTHROMBIN TIME: CPT | Performed by: HOSPITALIST

## 2021-01-01 PROCEDURE — 80053 COMPREHEN METABOLIC PANEL: CPT | Performed by: EMERGENCY MEDICINE

## 2021-01-01 PROCEDURE — 96368 THER/DIAG CONCURRENT INF: CPT

## 2021-01-01 PROCEDURE — 99495 TRANSJ CARE MGMT MOD F2F 14D: CPT | Mod: S$GLB,,, | Performed by: NURSE PRACTITIONER

## 2021-01-01 PROCEDURE — 99213 OFFICE O/P EST LOW 20 MIN: CPT | Mod: S$PBB,,, | Performed by: NURSE PRACTITIONER

## 2021-01-01 PROCEDURE — 96415 CHEMO IV INFUSION ADDL HR: CPT

## 2021-01-01 PROCEDURE — 96366 THER/PROPH/DIAG IV INF ADDON: CPT

## 2021-01-01 PROCEDURE — 80053 COMPREHEN METABOLIC PANEL: CPT | Performed by: NURSE PRACTITIONER

## 2021-01-01 PROCEDURE — 27202107 HC XP QUATRO SENSOR: Performed by: STUDENT IN AN ORGANIZED HEALTH CARE EDUCATION/TRAINING PROGRAM

## 2021-01-01 PROCEDURE — 85730 THROMBOPLASTIN TIME PARTIAL: CPT | Performed by: HOSPITALIST

## 2021-01-01 PROCEDURE — 25000003 PHARM REV CODE 250: Performed by: NURSE ANESTHETIST, CERTIFIED REGISTERED

## 2021-01-01 PROCEDURE — 27000284 HC CANNULA NASAL: Performed by: ANESTHESIOLOGY

## 2021-01-01 PROCEDURE — P9016 RBC LEUKOCYTES REDUCED: HCPCS

## 2021-01-01 PROCEDURE — 36415 COLL VENOUS BLD VENIPUNCTURE: CPT | Performed by: STUDENT IN AN ORGANIZED HEALTH CARE EDUCATION/TRAINING PROGRAM

## 2021-01-01 PROCEDURE — 86900 BLOOD TYPING SEROLOGIC ABO: CPT

## 2021-01-01 PROCEDURE — 96523 IRRIG DRUG DELIVERY DEVICE: CPT

## 2021-01-01 PROCEDURE — 37000008 HC ANESTHESIA 1ST 15 MINUTES: Performed by: INTERNAL MEDICINE

## 2021-01-01 PROCEDURE — 86920 COMPATIBILITY TEST SPIN: CPT | Performed by: INTERNAL MEDICINE

## 2021-01-01 PROCEDURE — 96416 CHEMO PROLONG INFUSE W/PUMP: CPT

## 2021-01-01 PROCEDURE — 99999 PR PBB SHADOW E&M-EST. PATIENT-LVL V: ICD-10-PCS | Mod: PBBFAC,,, | Performed by: NURSE PRACTITIONER

## 2021-01-01 PROCEDURE — 27202107 HC XP QUATRO SENSOR: Performed by: ANESTHESIOLOGY

## 2021-01-01 PROCEDURE — 96372 THER/PROPH/DIAG INJ SC/IM: CPT | Mod: 59

## 2021-01-01 PROCEDURE — 84484 ASSAY OF TROPONIN QUANT: CPT | Performed by: HOSPITALIST

## 2021-01-01 PROCEDURE — 81003 URINALYSIS AUTO W/O SCOPE: CPT | Performed by: EMERGENCY MEDICINE

## 2021-01-01 PROCEDURE — 25000003 PHARM REV CODE 250: Performed by: NURSE PRACTITIONER

## 2021-01-01 PROCEDURE — A4216 STERILE WATER/SALINE, 10 ML: HCPCS | Performed by: NURSE PRACTITIONER

## 2021-01-01 PROCEDURE — 36415 COLL VENOUS BLD VENIPUNCTURE: CPT | Performed by: NURSE PRACTITIONER

## 2021-01-01 PROCEDURE — 25000003 PHARM REV CODE 250: Performed by: EMERGENCY MEDICINE

## 2021-01-01 PROCEDURE — 76000 FLUOROSCOPY <1 HR PHYS/QHP: CPT | Performed by: INTERNAL MEDICINE

## 2021-01-01 PROCEDURE — 99285 EMERGENCY DEPT VISIT HI MDM: CPT | Mod: 25

## 2021-01-01 PROCEDURE — 71046 X-RAY EXAM CHEST 2 VIEWS: CPT | Mod: TC,PO

## 2021-01-01 RX ORDER — LABETALOL HYDROCHLORIDE 5 MG/ML
10 INJECTION, SOLUTION INTRAVENOUS EVERY 6 HOURS PRN
Status: DISCONTINUED | OUTPATIENT
Start: 2021-01-01 | End: 2021-01-01 | Stop reason: HOSPADM

## 2021-01-01 RX ORDER — POTASSIUM CHLORIDE 20 MEQ/1
20 TABLET, EXTENDED RELEASE ORAL
Status: DISCONTINUED | OUTPATIENT
Start: 2021-01-01 | End: 2021-01-01 | Stop reason: HOSPADM

## 2021-01-01 RX ORDER — DIPHENHYDRAMINE HYDROCHLORIDE 50 MG/ML
50 INJECTION INTRAMUSCULAR; INTRAVENOUS ONCE AS NEEDED
Status: CANCELLED | OUTPATIENT
Start: 2021-01-01

## 2021-01-01 RX ORDER — HEPARIN 100 UNIT/ML
500 SYRINGE INTRAVENOUS
Status: CANCELLED | OUTPATIENT
Start: 2021-01-01

## 2021-01-01 RX ORDER — FAMOTIDINE 10 MG/ML
20 INJECTION INTRAVENOUS
Status: CANCELLED | OUTPATIENT
Start: 2021-01-01

## 2021-01-01 RX ORDER — HYDRALAZINE HYDROCHLORIDE 50 MG/1
50 TABLET, FILM COATED ORAL 2 TIMES DAILY
Qty: 180 TABLET | Refills: 1 | Status: ON HOLD | OUTPATIENT
Start: 2021-01-01 | End: 2021-01-01 | Stop reason: HOSPADM

## 2021-01-01 RX ORDER — HYDRALAZINE HYDROCHLORIDE 50 MG/1
50 TABLET, FILM COATED ORAL 3 TIMES DAILY
Qty: 90 TABLET | Refills: 11 | Status: SHIPPED | OUTPATIENT
Start: 2021-01-01 | End: 2022-06-03

## 2021-01-01 RX ORDER — LISINOPRIL 20 MG/1
20 TABLET ORAL DAILY
Status: DISCONTINUED | OUTPATIENT
Start: 2021-01-01 | End: 2021-01-01 | Stop reason: HOSPADM

## 2021-01-01 RX ORDER — FAMOTIDINE 10 MG/ML
20 INJECTION INTRAVENOUS
Status: COMPLETED | OUTPATIENT
Start: 2021-01-01 | End: 2021-01-01

## 2021-01-01 RX ORDER — FENTANYL CITRATE 50 UG/ML
INJECTION, SOLUTION INTRAMUSCULAR; INTRAVENOUS
Status: DISCONTINUED | OUTPATIENT
Start: 2021-01-01 | End: 2021-01-01

## 2021-01-01 RX ORDER — SODIUM CHLORIDE 0.9 % (FLUSH) 0.9 %
10 SYRINGE (ML) INJECTION
Status: CANCELLED | OUTPATIENT
Start: 2021-01-01

## 2021-01-01 RX ORDER — CLINDAMYCIN PHOSPHATE 10 MG/G
GEL TOPICAL
COMMUNITY
Start: 2021-01-01 | End: 2021-01-01

## 2021-01-01 RX ORDER — SODIUM CHLORIDE 0.9 % (FLUSH) 0.9 %
10 SYRINGE (ML) INJECTION
Status: DISCONTINUED | OUTPATIENT
Start: 2021-01-01 | End: 2021-01-01 | Stop reason: HOSPADM

## 2021-01-01 RX ORDER — HEPARIN 100 UNIT/ML
500 SYRINGE INTRAVENOUS
Status: DISCONTINUED | OUTPATIENT
Start: 2021-01-01 | End: 2021-01-01 | Stop reason: HOSPADM

## 2021-01-01 RX ORDER — FAMOTIDINE 20 MG/1
20 TABLET, FILM COATED ORAL 2 TIMES DAILY
Status: DISCONTINUED | OUTPATIENT
Start: 2021-01-01 | End: 2021-01-01

## 2021-01-01 RX ORDER — ATROPINE SULFATE 0.4 MG/ML
0.4 INJECTION, SOLUTION ENDOTRACHEAL; INTRAMEDULLARY; INTRAMUSCULAR; INTRAVENOUS; SUBCUTANEOUS ONCE AS NEEDED
Status: CANCELLED | OUTPATIENT
Start: 2021-01-01

## 2021-01-01 RX ORDER — FUROSEMIDE 20 MG/1
20 TABLET ORAL DAILY PRN
COMMUNITY
Start: 2021-01-01

## 2021-01-01 RX ORDER — EPINEPHRINE 0.3 MG/.3ML
0.3 INJECTION SUBCUTANEOUS ONCE AS NEEDED
Status: CANCELLED | OUTPATIENT
Start: 2021-01-01

## 2021-01-01 RX ORDER — POLYETHYLENE GLYCOL 3350 17 G/17G
340 POWDER, FOR SOLUTION ORAL ONCE
Status: COMPLETED | OUTPATIENT
Start: 2021-01-01 | End: 2021-01-01

## 2021-01-01 RX ORDER — SUCRALFATE 1 G/10ML
1 SUSPENSION ORAL
Qty: 2 BOTTLE | Refills: 2 | Status: SHIPPED | OUTPATIENT
Start: 2021-01-01

## 2021-01-01 RX ORDER — HYDRALAZINE HYDROCHLORIDE 25 MG/1
50 TABLET, FILM COATED ORAL 2 TIMES DAILY
Status: DISCONTINUED | OUTPATIENT
Start: 2021-01-01 | End: 2021-01-01

## 2021-01-01 RX ORDER — PANTOPRAZOLE SODIUM 40 MG/1
40 TABLET, DELAYED RELEASE ORAL
Status: DISCONTINUED | OUTPATIENT
Start: 2021-01-01 | End: 2021-01-01 | Stop reason: HOSPADM

## 2021-01-01 RX ORDER — PANTOPRAZOLE SODIUM 40 MG/10ML
40 INJECTION, POWDER, LYOPHILIZED, FOR SOLUTION INTRAVENOUS
Status: COMPLETED | OUTPATIENT
Start: 2021-01-01 | End: 2021-01-01

## 2021-01-01 RX ORDER — ONDANSETRON 2 MG/ML
4 INJECTION INTRAMUSCULAR; INTRAVENOUS DAILY PRN
Status: DISCONTINUED | OUTPATIENT
Start: 2021-01-01 | End: 2021-01-01

## 2021-01-01 RX ORDER — ACETAMINOPHEN 325 MG/1
650 TABLET ORAL
Status: CANCELLED | OUTPATIENT
Start: 2021-01-01

## 2021-01-01 RX ORDER — LORAZEPAM 2 MG/ML
0.25 INJECTION INTRAMUSCULAR ONCE
Status: COMPLETED | OUTPATIENT
Start: 2021-01-01 | End: 2021-01-01

## 2021-01-01 RX ORDER — ONDANSETRON 8 MG/1
8 TABLET, ORALLY DISINTEGRATING ORAL EVERY 6 HOURS PRN
Qty: 30 TABLET | Refills: 5 | Status: SHIPPED | OUTPATIENT
Start: 2021-01-01

## 2021-01-01 RX ORDER — GABAPENTIN 100 MG/1
100 CAPSULE ORAL 3 TIMES DAILY
Qty: 90 CAPSULE | Refills: 3 | Status: SHIPPED | OUTPATIENT
Start: 2021-01-01 | End: 2021-01-01

## 2021-01-01 RX ORDER — ONDANSETRON 2 MG/ML
4 INJECTION INTRAMUSCULAR; INTRAVENOUS EVERY 6 HOURS PRN
Status: DISCONTINUED | OUTPATIENT
Start: 2021-01-01 | End: 2021-01-01

## 2021-01-01 RX ORDER — DEXAMETHASONE SODIUM PHOSPHATE 4 MG/ML
10 INJECTION, SOLUTION INTRA-ARTICULAR; INTRALESIONAL; INTRAMUSCULAR; INTRAVENOUS; SOFT TISSUE ONCE
Status: CANCELLED
Start: 2021-01-01

## 2021-01-01 RX ORDER — HYDROCODONE BITARTRATE AND ACETAMINOPHEN 500; 5 MG/1; MG/1
TABLET ORAL ONCE
Status: CANCELLED | OUTPATIENT
Start: 2021-01-01 | End: 2021-01-01

## 2021-01-01 RX ORDER — HYDROMORPHONE HYDROCHLORIDE 1 MG/ML
0.2 INJECTION, SOLUTION INTRAMUSCULAR; INTRAVENOUS; SUBCUTANEOUS
Status: DISCONTINUED | OUTPATIENT
Start: 2021-01-01 | End: 2021-01-01

## 2021-01-01 RX ORDER — FUROSEMIDE 10 MG/ML
20 INJECTION INTRAMUSCULAR; INTRAVENOUS ONCE
Status: CANCELLED | OUTPATIENT
Start: 2021-01-01

## 2021-01-01 RX ORDER — SODIUM CHLORIDE 9 MG/ML
INJECTION, SOLUTION INTRAVENOUS CONTINUOUS
Status: DISCONTINUED | OUTPATIENT
Start: 2021-01-01 | End: 2021-01-01

## 2021-01-01 RX ORDER — LEVOTHYROXINE SODIUM ANHYDROUS 100 UG/5ML
25 INJECTION, POWDER, LYOPHILIZED, FOR SOLUTION INTRAVENOUS DAILY
Status: DISCONTINUED | OUTPATIENT
Start: 2021-01-01 | End: 2021-01-01

## 2021-01-01 RX ORDER — DIAZEPAM 5 MG/1
5 TABLET ORAL 2 TIMES DAILY
Qty: 60 TABLET | Refills: 2 | Status: SHIPPED | OUTPATIENT
Start: 2021-01-01 | End: 2021-08-20

## 2021-01-01 RX ORDER — SUCRALFATE 1 G/1
1 TABLET ORAL
Status: DISCONTINUED | OUTPATIENT
Start: 2021-01-01 | End: 2021-01-01 | Stop reason: HOSPADM

## 2021-01-01 RX ORDER — ONDANSETRON 2 MG/ML
4 INJECTION INTRAMUSCULAR; INTRAVENOUS
Status: COMPLETED | OUTPATIENT
Start: 2021-01-01 | End: 2021-01-01

## 2021-01-01 RX ORDER — HYDRALAZINE HYDROCHLORIDE 20 MG/ML
10 INJECTION INTRAMUSCULAR; INTRAVENOUS EVERY 8 HOURS PRN
Status: DISCONTINUED | OUTPATIENT
Start: 2021-01-01 | End: 2021-01-01 | Stop reason: HOSPADM

## 2021-01-01 RX ORDER — FINASTERIDE 5 MG/1
5 TABLET, FILM COATED ORAL DAILY
Status: DISCONTINUED | OUTPATIENT
Start: 2021-01-01 | End: 2021-01-01 | Stop reason: HOSPADM

## 2021-01-01 RX ORDER — ACETAMINOPHEN 325 MG/1
650 TABLET ORAL
Status: COMPLETED | OUTPATIENT
Start: 2021-01-01 | End: 2021-01-01

## 2021-01-01 RX ORDER — METOCLOPRAMIDE HYDROCHLORIDE 5 MG/ML
10 INJECTION INTRAMUSCULAR; INTRAVENOUS EVERY 6 HOURS
Status: COMPLETED | OUTPATIENT
Start: 2021-01-01 | End: 2021-01-01

## 2021-01-01 RX ORDER — SODIUM CHLORIDE 0.9 % (FLUSH) 0.9 %
10 SYRINGE (ML) INJECTION
Status: DISCONTINUED | OUTPATIENT
Start: 2021-01-01 | End: 2021-01-01

## 2021-01-01 RX ORDER — POLYETHYLENE GLYCOL 3350 17 G/17G
17 POWDER, FOR SOLUTION ORAL 2 TIMES DAILY
COMMUNITY
Start: 2021-01-01

## 2021-01-01 RX ORDER — MEPERIDINE HYDROCHLORIDE 50 MG/ML
12.5 INJECTION INTRAMUSCULAR; INTRAVENOUS; SUBCUTANEOUS EVERY 10 MIN PRN
Status: DISCONTINUED | OUTPATIENT
Start: 2021-01-01 | End: 2021-01-01

## 2021-01-01 RX ORDER — DIAZEPAM 5 MG/1
5 TABLET ORAL 3 TIMES DAILY PRN
Status: DISCONTINUED | OUTPATIENT
Start: 2021-01-01 | End: 2021-01-01 | Stop reason: HOSPADM

## 2021-01-01 RX ORDER — DIPHENHYDRAMINE HYDROCHLORIDE 50 MG/ML
12.5 INJECTION INTRAMUSCULAR; INTRAVENOUS
Status: DISCONTINUED | OUTPATIENT
Start: 2021-01-01 | End: 2021-01-01 | Stop reason: HOSPADM

## 2021-01-01 RX ORDER — ONDANSETRON 2 MG/ML
16 INJECTION INTRAMUSCULAR; INTRAVENOUS ONCE
Status: CANCELLED | OUTPATIENT
Start: 2021-01-01

## 2021-01-01 RX ORDER — METOCLOPRAMIDE HYDROCHLORIDE 5 MG/ML
10 INJECTION INTRAMUSCULAR; INTRAVENOUS EVERY 6 HOURS
Status: DISCONTINUED | OUTPATIENT
Start: 2021-01-01 | End: 2021-01-01

## 2021-01-01 RX ORDER — PANTOPRAZOLE SODIUM 40 MG/1
40 TABLET, DELAYED RELEASE ORAL DAILY
Status: DISCONTINUED | OUTPATIENT
Start: 2021-01-01 | End: 2021-01-01

## 2021-01-01 RX ORDER — MEPERIDINE HYDROCHLORIDE 50 MG/ML
12.5 INJECTION INTRAMUSCULAR; INTRAVENOUS; SUBCUTANEOUS EVERY 10 MIN PRN
Status: ACTIVE | OUTPATIENT
Start: 2021-01-01 | End: 2021-01-01

## 2021-01-01 RX ORDER — SODIUM CHLORIDE, SODIUM LACTATE, POTASSIUM CHLORIDE, CALCIUM CHLORIDE 600; 310; 30; 20 MG/100ML; MG/100ML; MG/100ML; MG/100ML
INJECTION, SOLUTION INTRAVENOUS CONTINUOUS PRN
Status: DISCONTINUED | OUTPATIENT
Start: 2021-01-01 | End: 2021-01-01

## 2021-01-01 RX ORDER — DULOXETIN HYDROCHLORIDE 60 MG/1
60 CAPSULE, DELAYED RELEASE ORAL 2 TIMES DAILY
Qty: 180 CAPSULE | Refills: 2 | Status: SHIPPED | OUTPATIENT
Start: 2021-01-01

## 2021-01-01 RX ORDER — OXYCODONE HCL 10 MG/1
10-20 TABLET, FILM COATED, EXTENDED RELEASE ORAL
COMMUNITY
End: 2021-01-01

## 2021-01-01 RX ORDER — ATROPINE SULFATE 0.4 MG/ML
0.4 INJECTION, SOLUTION ENDOTRACHEAL; INTRAMEDULLARY; INTRAMUSCULAR; INTRAVENOUS; SUBCUTANEOUS ONCE AS NEEDED
Status: COMPLETED | OUTPATIENT
Start: 2021-01-01 | End: 2021-01-01

## 2021-01-01 RX ORDER — SUCCINYLCHOLINE CHLORIDE 20 MG/ML
INJECTION INTRAMUSCULAR; INTRAVENOUS
Status: DISCONTINUED | OUTPATIENT
Start: 2021-01-01 | End: 2021-01-01

## 2021-01-01 RX ORDER — FENTANYL 25 UG/1
2 PATCH TRANSDERMAL
Qty: 4 PATCH | Refills: 0 | Status: SHIPPED | OUTPATIENT
Start: 2021-01-01 | End: 2021-01-01

## 2021-01-01 RX ORDER — LACTULOSE 10 G/15ML
20 SOLUTION ORAL 2 TIMES DAILY PRN
Status: DISCONTINUED | OUTPATIENT
Start: 2021-01-01 | End: 2021-01-01 | Stop reason: HOSPADM

## 2021-01-01 RX ORDER — PHENYLEPHRINE HYDROCHLORIDE 10 MG/ML
INJECTION INTRAVENOUS
Status: DISCONTINUED | OUTPATIENT
Start: 2021-01-01 | End: 2021-01-01

## 2021-01-01 RX ORDER — FENTANYL 25 UG/1
2 PATCH TRANSDERMAL
Status: DISCONTINUED | OUTPATIENT
Start: 2021-01-01 | End: 2021-01-01 | Stop reason: HOSPADM

## 2021-01-01 RX ORDER — DIPHENHYDRAMINE HYDROCHLORIDE 50 MG/ML
12.5 INJECTION INTRAMUSCULAR; INTRAVENOUS
Status: DISCONTINUED | OUTPATIENT
Start: 2021-01-01 | End: 2021-01-01

## 2021-01-01 RX ORDER — ENOXAPARIN SODIUM 100 MG/ML
40 INJECTION SUBCUTANEOUS EVERY 24 HOURS
Status: DISCONTINUED | OUTPATIENT
Start: 2021-01-01 | End: 2021-01-01

## 2021-01-01 RX ORDER — LISINOPRIL AND HYDROCHLOROTHIAZIDE 20; 25 MG/1; MG/1
1 TABLET ORAL 2 TIMES DAILY
Qty: 180 TABLET | Refills: 1 | Status: SHIPPED | OUTPATIENT
Start: 2021-01-01

## 2021-01-01 RX ORDER — OXYCODONE HYDROCHLORIDE 5 MG/1
5 TABLET ORAL EVERY 4 HOURS PRN
Qty: 20 TABLET | Refills: 0 | Status: SHIPPED | OUTPATIENT
Start: 2021-01-01 | End: 2021-01-01

## 2021-01-01 RX ORDER — HYDRALAZINE HYDROCHLORIDE 50 MG/1
50 TABLET, FILM COATED ORAL 2 TIMES DAILY
Qty: 180 TABLET | Refills: 1 | Status: SHIPPED | OUTPATIENT
Start: 2021-01-01 | End: 2021-01-01 | Stop reason: SDUPTHER

## 2021-01-01 RX ORDER — FUROSEMIDE 10 MG/ML
20 INJECTION INTRAMUSCULAR; INTRAVENOUS ONCE
Status: COMPLETED | OUTPATIENT
Start: 2021-01-01 | End: 2021-01-01

## 2021-01-01 RX ORDER — POTASSIUM CHLORIDE 750 MG/1
20 CAPSULE, EXTENDED RELEASE ORAL 2 TIMES DAILY
Qty: 180 CAPSULE | Refills: 2 | Status: SHIPPED | OUTPATIENT
Start: 2021-01-01

## 2021-01-01 RX ORDER — HYDROMORPHONE HYDROCHLORIDE 1 MG/ML
1 INJECTION, SOLUTION INTRAMUSCULAR; INTRAVENOUS; SUBCUTANEOUS
Status: COMPLETED | OUTPATIENT
Start: 2021-01-01 | End: 2021-01-01

## 2021-01-01 RX ORDER — EZETIMIBE 10 MG/1
10 TABLET ORAL DAILY
Qty: 90 TABLET | Refills: 1 | Status: SHIPPED | OUTPATIENT
Start: 2021-01-01 | End: 2021-01-01

## 2021-01-01 RX ORDER — MORPHINE SULFATE 2 MG/ML
2 INJECTION, SOLUTION INTRAMUSCULAR; INTRAVENOUS EVERY 4 HOURS PRN
Status: DISCONTINUED | OUTPATIENT
Start: 2021-01-01 | End: 2021-01-01

## 2021-01-01 RX ORDER — EPINEPHRINE 0.3 MG/.3ML
0.3 INJECTION SUBCUTANEOUS ONCE AS NEEDED
Status: DISCONTINUED | OUTPATIENT
Start: 2021-01-01 | End: 2021-01-01 | Stop reason: HOSPADM

## 2021-01-01 RX ORDER — LIDOCAINE HYDROCHLORIDE 20 MG/ML
INJECTION, SOLUTION EPIDURAL; INFILTRATION; INTRACAUDAL; PERINEURAL
Status: DISCONTINUED | OUTPATIENT
Start: 2021-01-01 | End: 2021-01-01

## 2021-01-01 RX ORDER — ONDANSETRON 2 MG/ML
4 INJECTION INTRAMUSCULAR; INTRAVENOUS EVERY 8 HOURS PRN
Status: DISCONTINUED | OUTPATIENT
Start: 2021-01-01 | End: 2021-01-01

## 2021-01-01 RX ORDER — LANOLIN ALCOHOL/MO/W.PET/CERES
800 CREAM (GRAM) TOPICAL
Status: DISCONTINUED | OUTPATIENT
Start: 2021-01-01 | End: 2021-01-01 | Stop reason: HOSPADM

## 2021-01-01 RX ORDER — DEXTROMETHORPHAN/PSEUDOEPHED 2.5-7.5/.8
80 DROPS ORAL 4 TIMES DAILY
Status: DISCONTINUED | OUTPATIENT
Start: 2021-01-01 | End: 2021-01-01

## 2021-01-01 RX ORDER — FLUTICASONE PROPIONATE 50 MCG
1 SPRAY, SUSPENSION (ML) NASAL 2 TIMES DAILY
Status: DISCONTINUED | OUTPATIENT
Start: 2021-01-01 | End: 2021-01-01 | Stop reason: HOSPADM

## 2021-01-01 RX ORDER — POTASSIUM CHLORIDE 750 MG/1
10 CAPSULE, EXTENDED RELEASE ORAL 2 TIMES DAILY
Qty: 180 CAPSULE | Refills: 1 | Status: SHIPPED | OUTPATIENT
Start: 2021-01-01 | End: 2021-01-01

## 2021-01-01 RX ORDER — HYDROMORPHONE HYDROCHLORIDE 1 MG/ML
0.5 INJECTION, SOLUTION INTRAMUSCULAR; INTRAVENOUS; SUBCUTANEOUS EVERY 6 HOURS PRN
Status: DISCONTINUED | OUTPATIENT
Start: 2021-01-01 | End: 2021-01-01 | Stop reason: HOSPADM

## 2021-01-01 RX ORDER — EPLERENONE 50 MG/1
50 TABLET, FILM COATED ORAL 2 TIMES DAILY
Qty: 180 TABLET | Refills: 1 | Status: SHIPPED | OUTPATIENT
Start: 2021-01-01

## 2021-01-01 RX ORDER — EPLERENONE 50 MG/1
50 TABLET, FILM COATED ORAL 2 TIMES DAILY
Qty: 180 TABLET | Refills: 1 | Status: SHIPPED | OUTPATIENT
Start: 2021-01-01 | End: 2021-01-01 | Stop reason: SDUPTHER

## 2021-01-01 RX ORDER — POTASSIUM CHLORIDE 7.45 MG/ML
20 INJECTION INTRAVENOUS
Status: DISCONTINUED | OUTPATIENT
Start: 2021-01-01 | End: 2021-01-01 | Stop reason: HOSPADM

## 2021-01-01 RX ORDER — PANTOPRAZOLE SODIUM 40 MG/10ML
40 INJECTION, POWDER, LYOPHILIZED, FOR SOLUTION INTRAVENOUS DAILY
Status: DISCONTINUED | OUTPATIENT
Start: 2021-01-01 | End: 2021-01-01

## 2021-01-01 RX ORDER — MORPHINE SULFATE 2 MG/ML
2 INJECTION, SOLUTION INTRAMUSCULAR; INTRAVENOUS
Status: DISCONTINUED | OUTPATIENT
Start: 2021-01-01 | End: 2021-01-01

## 2021-01-01 RX ORDER — ENOXAPARIN SODIUM 100 MG/ML
40 INJECTION SUBCUTANEOUS EVERY 24 HOURS
Status: DISCONTINUED | OUTPATIENT
Start: 2021-01-01 | End: 2021-01-01 | Stop reason: HOSPADM

## 2021-01-01 RX ORDER — POTASSIUM CHLORIDE 20 MEQ/1
40 TABLET, EXTENDED RELEASE ORAL
Status: DISCONTINUED | OUTPATIENT
Start: 2021-01-01 | End: 2021-01-01 | Stop reason: HOSPADM

## 2021-01-01 RX ORDER — DIPHENHYDRAMINE HYDROCHLORIDE 50 MG/ML
50 INJECTION INTRAMUSCULAR; INTRAVENOUS ONCE AS NEEDED
Status: DISCONTINUED | OUTPATIENT
Start: 2021-01-01 | End: 2021-01-01 | Stop reason: HOSPADM

## 2021-01-01 RX ORDER — FAMOTIDINE 10 MG/ML
20 INJECTION INTRAVENOUS
Status: CANCELLED | OUTPATIENT
Start: 2021-01-01 | End: 2021-01-01

## 2021-01-01 RX ORDER — MAGNESIUM SULFATE HEPTAHYDRATE 40 MG/ML
2 INJECTION, SOLUTION INTRAVENOUS
Status: DISCONTINUED | OUTPATIENT
Start: 2021-01-01 | End: 2021-01-01 | Stop reason: HOSPADM

## 2021-01-01 RX ORDER — DULOXETIN HYDROCHLORIDE 60 MG/1
60 CAPSULE, DELAYED RELEASE ORAL DAILY
Qty: 90 CAPSULE | Refills: 0 | Status: SHIPPED | OUTPATIENT
Start: 2021-01-01 | End: 2021-01-01 | Stop reason: SDUPTHER

## 2021-01-01 RX ORDER — AMOXICILLIN 250 MG
2 CAPSULE ORAL 2 TIMES DAILY
Status: DISCONTINUED | OUTPATIENT
Start: 2021-01-01 | End: 2021-01-01 | Stop reason: HOSPADM

## 2021-01-01 RX ORDER — LACTULOSE 10 G/15ML
20 SOLUTION ORAL; RECTAL 3 TIMES DAILY PRN
COMMUNITY
Start: 2021-01-01

## 2021-01-01 RX ORDER — POLYETHYLENE GLYCOL 3350 17 G/17G
17 POWDER, FOR SOLUTION ORAL 2 TIMES DAILY
Status: DISCONTINUED | OUTPATIENT
Start: 2021-01-01 | End: 2021-01-01 | Stop reason: HOSPADM

## 2021-01-01 RX ORDER — DULOXETIN HYDROCHLORIDE 60 MG/1
60 CAPSULE, DELAYED RELEASE ORAL 2 TIMES DAILY
Qty: 180 CAPSULE | Refills: 1 | Status: SHIPPED | OUTPATIENT
Start: 2021-01-01 | End: 2021-01-01 | Stop reason: SDUPTHER

## 2021-01-01 RX ORDER — PROMETHAZINE HYDROCHLORIDE 25 MG/1
25 TABLET ORAL
Qty: 30 TABLET | Refills: 5 | Status: SHIPPED | OUTPATIENT
Start: 2021-01-01

## 2021-01-01 RX ORDER — OXYCODONE HYDROCHLORIDE 5 MG/1
5 TABLET ORAL
Status: DISCONTINUED | OUTPATIENT
Start: 2021-01-01 | End: 2021-01-01 | Stop reason: HOSPADM

## 2021-01-01 RX ORDER — DEXAMETHASONE SODIUM PHOSPHATE 4 MG/ML
10 INJECTION, SOLUTION INTRA-ARTICULAR; INTRALESIONAL; INTRAMUSCULAR; INTRAVENOUS; SOFT TISSUE ONCE
Status: CANCELLED
Start: 2021-01-01 | End: 2021-01-01

## 2021-01-01 RX ORDER — SYRING-NEEDL,DISP,INSUL,0.3 ML 29 G X1/2"
296 SYRINGE, EMPTY DISPOSABLE MISCELLANEOUS ONCE
Status: COMPLETED | OUTPATIENT
Start: 2021-01-01 | End: 2021-01-01

## 2021-01-01 RX ORDER — ONDANSETRON 2 MG/ML
INJECTION INTRAMUSCULAR; INTRAVENOUS
Status: DISCONTINUED | OUTPATIENT
Start: 2021-01-01 | End: 2021-01-01

## 2021-01-01 RX ORDER — DIPHENOXYLATE HYDROCHLORIDE AND ATROPINE SULFATE 2.5; .025 MG/1; MG/1
1 TABLET ORAL 4 TIMES DAILY PRN
COMMUNITY

## 2021-01-01 RX ORDER — DRONABINOL 10 MG/1
10 CAPSULE ORAL 3 TIMES DAILY
Qty: 30 CAPSULE | Refills: 0 | Status: SHIPPED | OUTPATIENT
Start: 2021-01-01

## 2021-01-01 RX ORDER — LEVOTHYROXINE SODIUM 25 UG/1
25 TABLET ORAL DAILY
Status: DISCONTINUED | OUTPATIENT
Start: 2021-01-01 | End: 2021-01-01

## 2021-01-01 RX ORDER — HYDROCODONE BITARTRATE AND ACETAMINOPHEN 500; 5 MG/1; MG/1
TABLET ORAL ONCE
Status: DISCONTINUED | OUTPATIENT
Start: 2021-01-01 | End: 2021-01-01 | Stop reason: HOSPADM

## 2021-01-01 RX ORDER — DIPHENHYDRAMINE HYDROCHLORIDE 50 MG/ML
25 INJECTION INTRAMUSCULAR; INTRAVENOUS ONCE
Status: CANCELLED | OUTPATIENT
Start: 2021-01-01 | End: 2021-08-17

## 2021-01-01 RX ORDER — FINASTERIDE 5 MG/1
5 TABLET, FILM COATED ORAL DAILY
Qty: 90 TABLET | Refills: 1 | Status: SHIPPED | OUTPATIENT
Start: 2021-01-01

## 2021-01-01 RX ORDER — HYDROCODONE BITARTRATE AND ACETAMINOPHEN 500; 5 MG/1; MG/1
TABLET ORAL
Status: DISCONTINUED | OUTPATIENT
Start: 2021-01-01 | End: 2021-01-01 | Stop reason: HOSPADM

## 2021-01-01 RX ORDER — POTASSIUM CHLORIDE 750 MG/1
20 CAPSULE, EXTENDED RELEASE ORAL 2 TIMES DAILY
COMMUNITY
End: 2021-01-01

## 2021-01-01 RX ORDER — OXYCODONE HYDROCHLORIDE 5 MG/1
5 TABLET ORAL
Status: DISCONTINUED | OUTPATIENT
Start: 2021-01-01 | End: 2021-01-01

## 2021-01-01 RX ORDER — HYDRALAZINE HYDROCHLORIDE 25 MG/1
50 TABLET, FILM COATED ORAL 3 TIMES DAILY
Status: DISCONTINUED | OUTPATIENT
Start: 2021-01-01 | End: 2021-01-01 | Stop reason: HOSPADM

## 2021-01-01 RX ORDER — OXYCODONE AND ACETAMINOPHEN 5; 325 MG/1; MG/1
TABLET ORAL
Status: ON HOLD | COMMUNITY
Start: 2021-01-01 | End: 2021-01-01 | Stop reason: HOSPADM

## 2021-01-01 RX ORDER — MORPHINE SULFATE 15 MG/1
15 TABLET, FILM COATED, EXTENDED RELEASE ORAL 2 TIMES DAILY
Qty: 20 TABLET | Refills: 0 | Status: SHIPPED | OUTPATIENT
Start: 2021-01-01

## 2021-01-01 RX ORDER — LIDOCAINE HYDROCHLORIDE 20 MG/ML
JELLY TOPICAL
Status: DISCONTINUED | OUTPATIENT
Start: 2021-01-01 | End: 2021-01-01

## 2021-01-01 RX ORDER — HYDROCODONE BITARTRATE AND ACETAMINOPHEN 500; 5 MG/1; MG/1
TABLET ORAL ONCE
Status: COMPLETED | OUTPATIENT
Start: 2021-01-01 | End: 2021-01-01

## 2021-01-01 RX ORDER — PROPOFOL 10 MG/ML
VIAL (ML) INTRAVENOUS
Status: DISCONTINUED | OUTPATIENT
Start: 2021-01-01 | End: 2021-01-01

## 2021-01-01 RX ORDER — ONDANSETRON HYDROCHLORIDE 8 MG/1
8 TABLET, FILM COATED ORAL EVERY 8 HOURS PRN
Qty: 30 TABLET | Refills: 5 | Status: SHIPPED | OUTPATIENT
Start: 2021-01-01 | End: 2022-05-21

## 2021-01-01 RX ORDER — FENTANYL 75 UG/H
1 PATCH TRANSDERMAL
COMMUNITY

## 2021-01-01 RX ORDER — POTASSIUM CHLORIDE 7.45 MG/ML
40 INJECTION INTRAVENOUS
Status: DISCONTINUED | OUTPATIENT
Start: 2021-01-01 | End: 2021-01-01 | Stop reason: HOSPADM

## 2021-01-01 RX ORDER — POLYETHYLENE GLYCOL 3350 17 G/17G
17 POWDER, FOR SOLUTION ORAL 2 TIMES DAILY
Qty: 60 EACH | Refills: 0 | Status: SHIPPED | OUTPATIENT
Start: 2021-01-01 | End: 2021-01-01

## 2021-01-01 RX ORDER — FAMOTIDINE 20 MG/1
20 TABLET, FILM COATED ORAL 2 TIMES DAILY
Qty: 180 TABLET | Refills: 1 | Status: SHIPPED | OUTPATIENT
Start: 2021-01-01 | End: 2021-01-01

## 2021-01-01 RX ORDER — LABETALOL HYDROCHLORIDE 5 MG/ML
INJECTION, SOLUTION INTRAVENOUS
Status: DISPENSED
Start: 2021-01-01 | End: 2021-01-01

## 2021-01-01 RX ORDER — HYDRALAZINE HYDROCHLORIDE 20 MG/ML
10 INJECTION INTRAMUSCULAR; INTRAVENOUS EVERY 4 HOURS PRN
Status: DISCONTINUED | OUTPATIENT
Start: 2021-01-01 | End: 2021-01-01 | Stop reason: HOSPADM

## 2021-01-01 RX ORDER — EPLERENONE 25 MG/1
50 TABLET, FILM COATED ORAL 2 TIMES DAILY
Status: DISCONTINUED | OUTPATIENT
Start: 2021-01-01 | End: 2021-01-01 | Stop reason: HOSPADM

## 2021-01-01 RX ORDER — OXYCODONE HYDROCHLORIDE 5 MG/1
5 TABLET ORAL
Qty: 49 TABLET | Refills: 0 | Status: SHIPPED | OUTPATIENT
Start: 2021-01-01 | End: 2021-01-01

## 2021-01-01 RX ORDER — FENTANYL 25 UG/1
PATCH TRANSDERMAL
Status: ON HOLD | COMMUNITY
Start: 2021-01-01 | End: 2021-01-01 | Stop reason: HOSPADM

## 2021-01-01 RX ORDER — SUCRALFATE 1 G/10ML
1 SUSPENSION ORAL
Qty: 2 BOTTLE | Refills: 2 | Status: SHIPPED | OUTPATIENT
Start: 2021-01-01 | End: 2021-01-01 | Stop reason: SDUPTHER

## 2021-01-01 RX ORDER — DEXTROMETHORPHAN/PSEUDOEPHED 2.5-7.5/.8
125 DROPS ORAL EVERY 8 HOURS PRN
Status: DISCONTINUED | OUTPATIENT
Start: 2021-01-01 | End: 2021-01-01 | Stop reason: HOSPADM

## 2021-01-01 RX ORDER — LABETALOL HYDROCHLORIDE 5 MG/ML
10 INJECTION, SOLUTION INTRAVENOUS ONCE
Status: DISCONTINUED | OUTPATIENT
Start: 2021-01-01 | End: 2021-01-01

## 2021-01-01 RX ORDER — EZETIMIBE 10 MG/1
10 TABLET ORAL DAILY
Status: DISCONTINUED | OUTPATIENT
Start: 2021-01-01 | End: 2021-01-01 | Stop reason: HOSPADM

## 2021-01-01 RX ORDER — DULOXETIN HYDROCHLORIDE 60 MG/1
60 CAPSULE, DELAYED RELEASE ORAL 2 TIMES DAILY
Qty: 180 CAPSULE | Refills: 0 | Status: SHIPPED | OUTPATIENT
Start: 2021-01-01 | End: 2021-01-01 | Stop reason: SDUPTHER

## 2021-01-01 RX ORDER — DOCUSATE SODIUM 100 MG/1
100 CAPSULE, LIQUID FILLED ORAL 3 TIMES DAILY PRN
Qty: 60 CAPSULE | Refills: 0 | Status: SHIPPED | OUTPATIENT
Start: 2021-01-01

## 2021-01-01 RX ORDER — MORPHINE SULFATE 4 MG/ML
4 INJECTION, SOLUTION INTRAMUSCULAR; INTRAVENOUS EVERY 4 HOURS PRN
Status: DISCONTINUED | OUTPATIENT
Start: 2021-01-01 | End: 2021-01-01 | Stop reason: HOSPADM

## 2021-01-01 RX ORDER — DEXTROSE MONOHYDRATE, SODIUM CHLORIDE, AND POTASSIUM CHLORIDE 50; 2.98; 4.5 G/1000ML; G/1000ML; G/1000ML
INJECTION, SOLUTION INTRAVENOUS CONTINUOUS
Status: DISCONTINUED | OUTPATIENT
Start: 2021-01-01 | End: 2021-01-01

## 2021-01-01 RX ORDER — DULOXETIN HYDROCHLORIDE 30 MG/1
60 CAPSULE, DELAYED RELEASE ORAL 2 TIMES DAILY
Status: DISCONTINUED | OUTPATIENT
Start: 2021-01-01 | End: 2021-01-01 | Stop reason: HOSPADM

## 2021-01-01 RX ORDER — MAGNESIUM SULFATE 1 G/100ML
1 INJECTION INTRAVENOUS
Status: DISCONTINUED | OUTPATIENT
Start: 2021-01-01 | End: 2021-01-01 | Stop reason: HOSPADM

## 2021-01-01 RX ORDER — POTASSIUM CHLORIDE 1500 MG/1
20 TABLET, EXTENDED RELEASE ORAL 2 TIMES DAILY
COMMUNITY
Start: 2021-01-01 | End: 2021-01-01

## 2021-01-01 RX ORDER — ONDANSETRON 2 MG/ML
16 INJECTION INTRAMUSCULAR; INTRAVENOUS ONCE
Status: CANCELLED
Start: 2021-01-01 | End: 2021-01-01

## 2021-01-01 RX ORDER — LEVOTHYROXINE SODIUM 25 UG/1
25 TABLET ORAL DAILY
Qty: 90 TABLET | Refills: 1 | Status: SHIPPED | OUTPATIENT
Start: 2021-01-01

## 2021-01-01 RX ORDER — ONDANSETRON 2 MG/ML
16 INJECTION INTRAMUSCULAR; INTRAVENOUS ONCE
Status: DISCONTINUED | OUTPATIENT
Start: 2021-01-01 | End: 2021-01-01 | Stop reason: SDUPTHER

## 2021-01-01 RX ORDER — PANTOPRAZOLE SODIUM 40 MG/1
40 TABLET, DELAYED RELEASE ORAL DAILY
Qty: 90 TABLET | Refills: 1 | Status: SHIPPED | OUTPATIENT
Start: 2021-01-01

## 2021-01-01 RX ORDER — LEVOTHYROXINE SODIUM 25 UG/1
25 TABLET ORAL
Status: DISCONTINUED | OUTPATIENT
Start: 2021-01-01 | End: 2021-01-01 | Stop reason: HOSPADM

## 2021-01-01 RX ORDER — FAMOTIDINE 20 MG/1
20 TABLET, FILM COATED ORAL 2 TIMES DAILY
Qty: 20 TABLET | Refills: 0 | Status: SHIPPED | OUTPATIENT
Start: 2021-01-01 | End: 2022-06-18

## 2021-01-01 RX ORDER — FENTANYL 25 UG/1
1 PATCH TRANSDERMAL
Status: DISCONTINUED | OUTPATIENT
Start: 2021-01-01 | End: 2021-01-01

## 2021-01-01 RX ORDER — ONDANSETRON 2 MG/ML
4 INJECTION INTRAMUSCULAR; INTRAVENOUS DAILY PRN
Status: COMPLETED | OUTPATIENT
Start: 2021-01-01 | End: 2021-01-01

## 2021-01-01 RX ORDER — MAGNESIUM SULFATE HEPTAHYDRATE 40 MG/ML
4 INJECTION, SOLUTION INTRAVENOUS
Status: DISCONTINUED | OUTPATIENT
Start: 2021-01-01 | End: 2021-01-01 | Stop reason: HOSPADM

## 2021-01-01 RX ADMIN — FENTANYL CITRATE 50 MCG: 50 INJECTION INTRAMUSCULAR; INTRAVENOUS at 11:05

## 2021-01-01 RX ADMIN — HYDRALAZINE HYDROCHLORIDE 10 MG: 20 INJECTION, SOLUTION INTRAMUSCULAR; INTRAVENOUS at 04:05

## 2021-01-01 RX ADMIN — PROPOFOL 120 MG: 10 INJECTION, EMULSION INTRAVENOUS at 11:05

## 2021-01-01 RX ADMIN — LABETALOL HYDROCHLORIDE 10 MG: 5 INJECTION INTRAVENOUS at 04:05

## 2021-01-01 RX ADMIN — SODIUM CHLORIDE: 0.9 INJECTION, SOLUTION INTRAVENOUS at 07:04

## 2021-01-01 RX ADMIN — IOHEXOL 100 ML: 350 INJECTION, SOLUTION INTRAVENOUS at 02:06

## 2021-01-01 RX ADMIN — FLUTICASONE PROPIONATE 50 MCG: 50 SPRAY, METERED NASAL at 08:05

## 2021-01-01 RX ADMIN — PANTOPRAZOLE SODIUM 40 MG: 40 TABLET, DELAYED RELEASE ORAL at 05:05

## 2021-01-01 RX ADMIN — LIDOCAINE HYDROCHLORIDE 4 ML: 20 JELLY TOPICAL at 02:05

## 2021-01-01 RX ADMIN — PACLITAXEL 250 MG: 100 INJECTION, POWDER, LYOPHILIZED, FOR SUSPENSION INTRAVENOUS at 11:01

## 2021-01-01 RX ADMIN — FUROSEMIDE 20 MG: 10 INJECTION, SOLUTION INTRAVENOUS at 11:04

## 2021-01-01 RX ADMIN — MORPHINE SULFATE 2 MG: 2 INJECTION, SOLUTION INTRAMUSCULAR; INTRAVENOUS at 11:05

## 2021-01-01 RX ADMIN — ACETAMINOPHEN 650 MG: 325 TABLET ORAL at 11:02

## 2021-01-01 RX ADMIN — SODIUM CHLORIDE, PRESERVATIVE FREE 10 ML: 5 INJECTION INTRAVENOUS at 05:01

## 2021-01-01 RX ADMIN — PACLITAXEL 240 MG: 100 INJECTION, POWDER, LYOPHILIZED, FOR SUSPENSION INTRAVENOUS at 01:03

## 2021-01-01 RX ADMIN — EPLERENONE 50 MG: 25 TABLET, FILM COATED ORAL at 08:05

## 2021-01-01 RX ADMIN — HEPARIN 500 UNITS: 100 SYRINGE at 12:07

## 2021-01-01 RX ADMIN — SUCRALFATE 1 G: 1 TABLET ORAL at 05:05

## 2021-01-01 RX ADMIN — DULOXETINE 60 MG: 30 CAPSULE, DELAYED RELEASE ORAL at 08:05

## 2021-01-01 RX ADMIN — Medication 125.33 MG: at 10:05

## 2021-01-01 RX ADMIN — GEMCITABINE HYDROCHLORIDE 1990 MG: 2 INJECTION, SOLUTION INTRAVENOUS at 01:04

## 2021-01-01 RX ADMIN — FLUTICASONE PROPIONATE 50 MCG: 50 SPRAY, METERED NASAL at 09:05

## 2021-01-01 RX ADMIN — FINASTERIDE 5 MG: 5 TABLET, FILM COATED ORAL at 10:06

## 2021-01-01 RX ADMIN — LISINOPRIL 20 MG: 20 TABLET ORAL at 08:05

## 2021-01-01 RX ADMIN — SIMETHICONE 80 MG: 20 SUSPENSION ORAL at 05:05

## 2021-01-01 RX ADMIN — SUCRALFATE 1 G: 1 TABLET ORAL at 08:06

## 2021-01-01 RX ADMIN — METOCLOPRAMIDE 10 MG: 5 INJECTION, SOLUTION INTRAMUSCULAR; INTRAVENOUS at 03:05

## 2021-01-01 RX ADMIN — HYDRALAZINE HYDROCHLORIDE 10 MG: 20 INJECTION, SOLUTION INTRAMUSCULAR; INTRAVENOUS at 05:05

## 2021-01-01 RX ADMIN — PROPOFOL 50 MG: 10 INJECTION, EMULSION INTRAVENOUS at 02:05

## 2021-01-01 RX ADMIN — DIPHENHYDRAMINE HYDROCHLORIDE 25 MG: 50 INJECTION INTRAMUSCULAR; INTRAVENOUS at 11:03

## 2021-01-01 RX ADMIN — POTASSIUM CHLORIDE 40 MEQ: 7.46 INJECTION, SOLUTION INTRAVENOUS at 04:05

## 2021-01-01 RX ADMIN — SODIUM CHLORIDE, PRESERVATIVE FREE 10 ML: 5 INJECTION INTRAVENOUS at 02:08

## 2021-01-01 RX ADMIN — MORPHINE SULFATE 4 MG: 4 INJECTION, SOLUTION INTRAMUSCULAR; INTRAVENOUS at 05:05

## 2021-01-01 RX ADMIN — HYDRALAZINE HYDROCHLORIDE 50 MG: 25 TABLET, FILM COATED ORAL at 08:05

## 2021-01-01 RX ADMIN — EPOETIN ALFA-EPBX 20000 UNITS: 10000 INJECTION, SOLUTION INTRAVENOUS; SUBCUTANEOUS at 03:02

## 2021-01-01 RX ADMIN — MORPHINE SULFATE 4 MG: 4 INJECTION, SOLUTION INTRAMUSCULAR; INTRAVENOUS at 02:05

## 2021-01-01 RX ADMIN — LABETALOL HYDROCHLORIDE 10 MG: 5 INJECTION INTRAVENOUS at 10:05

## 2021-01-01 RX ADMIN — SODIUM CHLORIDE: 0.9 INJECTION, SOLUTION INTRAVENOUS at 06:05

## 2021-01-01 RX ADMIN — DIAZEPAM 5 MG: 5 TABLET ORAL at 12:05

## 2021-01-01 RX ADMIN — FINASTERIDE 5 MG: 5 TABLET, FILM COATED ORAL at 08:05

## 2021-01-01 RX ADMIN — FINASTERIDE 5 MG: 5 TABLET, FILM COATED ORAL at 09:06

## 2021-01-01 RX ADMIN — SODIUM CHLORIDE, SODIUM LACTATE, POTASSIUM CHLORIDE, AND CALCIUM CHLORIDE: .6; .31; .03; .02 INJECTION, SOLUTION INTRAVENOUS at 11:05

## 2021-01-01 RX ADMIN — PANTOPRAZOLE SODIUM 40 MG: 40 INJECTION, POWDER, LYOPHILIZED, FOR SOLUTION INTRAVENOUS at 05:05

## 2021-01-01 RX ADMIN — MORPHINE SULFATE 4 MG: 4 INJECTION, SOLUTION INTRAMUSCULAR; INTRAVENOUS at 07:05

## 2021-01-01 RX ADMIN — DEXAMETHASONE SODIUM PHOSPHATE 10 MG: 4 INJECTION, SOLUTION INTRA-ARTICULAR; INTRALESIONAL; INTRAMUSCULAR; INTRAVENOUS; SOFT TISSUE at 12:02

## 2021-01-01 RX ADMIN — METOCLOPRAMIDE: 5 INJECTION, SOLUTION INTRAMUSCULAR; INTRAVENOUS at 12:06

## 2021-01-01 RX ADMIN — HYDRALAZINE HYDROCHLORIDE 10 MG: 20 INJECTION, SOLUTION INTRAMUSCULAR; INTRAVENOUS at 11:05

## 2021-01-01 RX ADMIN — DULOXETINE 60 MG: 30 CAPSULE, DELAYED RELEASE ORAL at 10:06

## 2021-01-01 RX ADMIN — MORPHINE SULFATE 4 MG: 4 INJECTION, SOLUTION INTRAMUSCULAR; INTRAVENOUS at 03:06

## 2021-01-01 RX ADMIN — MORPHINE SULFATE 4 MG: 4 INJECTION, SOLUTION INTRAMUSCULAR; INTRAVENOUS at 01:05

## 2021-01-01 RX ADMIN — LEVOTHYROXINE SODIUM 25 MCG: 25 TABLET ORAL at 05:06

## 2021-01-01 RX ADMIN — HYDROMORPHONE HYDROCHLORIDE 1 MG: 1 INJECTION, SOLUTION INTRAMUSCULAR; INTRAVENOUS; SUBCUTANEOUS at 04:06

## 2021-01-01 RX ADMIN — DULOXETINE 60 MG: 30 CAPSULE, DELAYED RELEASE ORAL at 09:06

## 2021-01-01 RX ADMIN — IOHEXOL 100 ML: 350 INJECTION, SOLUTION INTRAVENOUS at 02:07

## 2021-01-01 RX ADMIN — EPOETIN ALFA-EPBX 20000 UNITS: 20000 INJECTION, SOLUTION INTRAVENOUS; SUBCUTANEOUS at 02:03

## 2021-01-01 RX ADMIN — EPOETIN ALFA-EPBX 20000 UNITS: 10000 INJECTION, SOLUTION INTRAVENOUS; SUBCUTANEOUS at 10:01

## 2021-01-01 RX ADMIN — ENOXAPARIN SODIUM 40 MG: 40 INJECTION SUBCUTANEOUS at 05:05

## 2021-01-01 RX ADMIN — HYDRALAZINE HYDROCHLORIDE 50 MG: 25 TABLET, FILM COATED ORAL at 09:06

## 2021-01-01 RX ADMIN — LORAZEPAM 0.25 MG: 2 INJECTION INTRAMUSCULAR; INTRAVENOUS at 08:05

## 2021-01-01 RX ADMIN — PACLITAXEL 250 MG: 100 INJECTION, POWDER, LYOPHILIZED, FOR SUSPENSION INTRAVENOUS at 12:02

## 2021-01-01 RX ADMIN — LEVOTHYROXINE SODIUM ANHYDROUS 25 MCG: 100 INJECTION, POWDER, LYOPHILIZED, FOR SOLUTION INTRAVENOUS at 08:05

## 2021-01-01 RX ADMIN — METHYLNALTREXONE BROMIDE 12 MG: 12 INJECTION, SOLUTION SUBCUTANEOUS at 06:05

## 2021-01-01 RX ADMIN — MORPHINE SULFATE 4 MG: 4 INJECTION, SOLUTION INTRAMUSCULAR; INTRAVENOUS at 10:06

## 2021-01-01 RX ADMIN — SODIUM CHLORIDE: 0.9 INJECTION, SOLUTION INTRAVENOUS at 10:01

## 2021-01-01 RX ADMIN — HYDRALAZINE HYDROCHLORIDE 10 MG: 20 INJECTION INTRAMUSCULAR; INTRAVENOUS at 07:05

## 2021-01-01 RX ADMIN — DIAZEPAM 5 MG: 5 TABLET ORAL at 03:06

## 2021-01-01 RX ADMIN — EPLERENONE 50 MG: 25 TABLET, FILM COATED ORAL at 08:06

## 2021-01-01 RX ADMIN — EZETIMIBE 10 MG: 10 TABLET ORAL at 10:06

## 2021-01-01 RX ADMIN — MORPHINE SULFATE 2 MG: 2 INJECTION, SOLUTION INTRAMUSCULAR; INTRAVENOUS at 02:05

## 2021-01-01 RX ADMIN — DIPHENHYDRAMINE HYDROCHLORIDE 25 MG: 50 INJECTION INTRAMUSCULAR; INTRAVENOUS at 12:02

## 2021-01-01 RX ADMIN — EZETIMIBE 10 MG: 10 TABLET ORAL at 08:05

## 2021-01-01 RX ADMIN — DIPHENHYDRAMINE HYDROCHLORIDE 25 MG: 50 INJECTION INTRAMUSCULAR; INTRAVENOUS at 10:04

## 2021-01-01 RX ADMIN — SODIUM CHLORIDE 10 ML: 9 INJECTION INTRAMUSCULAR; INTRAVENOUS; SUBCUTANEOUS at 02:02

## 2021-01-01 RX ADMIN — SUCRALFATE 1 G: 1 TABLET ORAL at 04:05

## 2021-01-01 RX ADMIN — HYDRALAZINE HYDROCHLORIDE 10 MG: 20 INJECTION, SOLUTION INTRAMUSCULAR; INTRAVENOUS at 08:05

## 2021-01-01 RX ADMIN — EPOETIN ALFA-EPBX 20000 UNITS: 20000 INJECTION, SOLUTION INTRAVENOUS; SUBCUTANEOUS at 12:07

## 2021-01-01 RX ADMIN — GEMCITABINE HYDROCHLORIDE 2030 MG: 2 INJECTION, SOLUTION INTRAVENOUS at 01:02

## 2021-01-01 RX ADMIN — ONDANSETRON 16 MG: 2 INJECTION INTRAMUSCULAR; INTRAVENOUS at 11:04

## 2021-01-01 RX ADMIN — METOCLOPRAMIDE 10 MG: 5 INJECTION, SOLUTION INTRAMUSCULAR; INTRAVENOUS at 11:05

## 2021-01-01 RX ADMIN — PALONOSETRON HYDROCHLORIDE 0.25 MG: 0.25 INJECTION, SOLUTION INTRAVENOUS at 08:06

## 2021-01-01 RX ADMIN — SUCRALFATE 1 G: 1 TABLET ORAL at 11:06

## 2021-01-01 RX ADMIN — FAMOTIDINE 20 MG: 10 INJECTION INTRAVENOUS at 10:04

## 2021-01-01 RX ADMIN — DEXTROSE MONOHYDRATE, SODIUM CHLORIDE, AND POTASSIUM CHLORIDE: 50; 4.5; 2.98 INJECTION, SOLUTION INTRAVENOUS at 09:05

## 2021-01-01 RX ADMIN — FLUOROURACIL 3700 MG: 50 INJECTION, SOLUTION INTRAVENOUS at 12:06

## 2021-01-01 RX ADMIN — EPLERENONE 50 MG: 25 TABLET, FILM COATED ORAL at 11:05

## 2021-01-01 RX ADMIN — MORPHINE SULFATE 4 MG: 4 INJECTION, SOLUTION INTRAMUSCULAR; INTRAVENOUS at 04:06

## 2021-01-01 RX ADMIN — OXYCODONE HYDROCHLORIDE 5 MG: 5 TABLET ORAL at 07:06

## 2021-01-01 RX ADMIN — HYDRALAZINE HYDROCHLORIDE 50 MG: 25 TABLET, FILM COATED ORAL at 11:05

## 2021-01-01 RX ADMIN — DULOXETINE 60 MG: 30 CAPSULE, DELAYED RELEASE ORAL at 08:06

## 2021-01-01 RX ADMIN — MORPHINE SULFATE 4 MG: 4 INJECTION, SOLUTION INTRAMUSCULAR; INTRAVENOUS at 06:05

## 2021-01-01 RX ADMIN — ONDANSETRON 16 MG: 2 INJECTION INTRAMUSCULAR; INTRAVENOUS at 01:08

## 2021-01-01 RX ADMIN — SUCRALFATE 1 G: 1 TABLET ORAL at 11:05

## 2021-01-01 RX ADMIN — FLUTICASONE PROPIONATE 50 MCG: 50 SPRAY, METERED NASAL at 09:06

## 2021-01-01 RX ADMIN — MORPHINE SULFATE 4 MG: 4 INJECTION, SOLUTION INTRAMUSCULAR; INTRAVENOUS at 08:05

## 2021-01-01 RX ADMIN — PACLITAXEL 250 MG: 100 INJECTION, POWDER, LYOPHILIZED, FOR SUSPENSION INTRAVENOUS at 11:04

## 2021-01-01 RX ADMIN — SUCRALFATE 1 G: 1 TABLET ORAL at 08:05

## 2021-01-01 RX ADMIN — SODIUM CHLORIDE: 0.9 INJECTION, SOLUTION INTRAVENOUS at 11:02

## 2021-01-01 RX ADMIN — SUCCINYLCHOLINE CHLORIDE 140 MG: 20 INJECTION, SOLUTION INTRAMUSCULAR; INTRAVENOUS at 11:05

## 2021-01-01 RX ADMIN — ACETAMINOPHEN 325 MG: 325 TABLET ORAL at 11:02

## 2021-01-01 RX ADMIN — PROPOFOL 100 MG: 10 INJECTION, EMULSION INTRAVENOUS at 01:05

## 2021-01-01 RX ADMIN — LACTULOSE 20 G: 20 SOLUTION ORAL at 02:05

## 2021-01-01 RX ADMIN — ENOXAPARIN SODIUM 40 MG: 40 INJECTION SUBCUTANEOUS at 04:06

## 2021-01-01 RX ADMIN — MORPHINE SULFATE 2 MG: 2 INJECTION, SOLUTION INTRAMUSCULAR; INTRAVENOUS at 08:05

## 2021-01-01 RX ADMIN — MORPHINE SULFATE 2 MG: 2 INJECTION, SOLUTION INTRAMUSCULAR; INTRAVENOUS at 07:05

## 2021-01-01 RX ADMIN — DEXAMETHASONE SODIUM PHOSPHATE 10 MG: 4 INJECTION, SOLUTION INTRA-ARTICULAR; INTRALESIONAL; INTRAMUSCULAR; INTRAVENOUS; SOFT TISSUE at 11:03

## 2021-01-01 RX ADMIN — DIPHENHYDRAMINE HYDROCHLORIDE 25 MG: 50 INJECTION INTRAMUSCULAR; INTRAVENOUS at 11:02

## 2021-01-01 RX ADMIN — OXYCODONE HYDROCHLORIDE 5 MG: 5 TABLET ORAL at 07:05

## 2021-01-01 RX ADMIN — DIAZEPAM 5 MG: 5 TABLET ORAL at 12:06

## 2021-01-01 RX ADMIN — GEMCITABINE HYDROCHLORIDE 1990 MG: 2 INJECTION, SOLUTION INTRAVENOUS at 01:02

## 2021-01-01 RX ADMIN — EZETIMIBE 10 MG: 10 TABLET ORAL at 09:06

## 2021-01-01 RX ADMIN — PANTOPRAZOLE SODIUM 40 MG: 40 TABLET, DELAYED RELEASE ORAL at 05:06

## 2021-01-01 RX ADMIN — ONDANSETRON 16 MG: 2 INJECTION INTRAMUSCULAR; INTRAVENOUS at 12:03

## 2021-01-01 RX ADMIN — ONDANSETRON 16 MG: 2 INJECTION INTRAMUSCULAR; INTRAVENOUS at 11:01

## 2021-01-01 RX ADMIN — ENOXAPARIN SODIUM 40 MG: 40 INJECTION SUBCUTANEOUS at 04:05

## 2021-01-01 RX ADMIN — DULOXETINE 60 MG: 30 CAPSULE, DELAYED RELEASE ORAL at 11:05

## 2021-01-01 RX ADMIN — MORPHINE SULFATE 2 MG: 2 INJECTION, SOLUTION INTRAMUSCULAR; INTRAVENOUS at 01:05

## 2021-01-01 RX ADMIN — FAMOTIDINE 20 MG: 10 INJECTION INTRAVENOUS at 11:02

## 2021-01-01 RX ADMIN — GEMCITABINE HYDROCHLORIDE 2030 MG: 2 INJECTION, SOLUTION INTRAVENOUS at 02:02

## 2021-01-01 RX ADMIN — LIDOCAINE HYDROCHLORIDE 80 MG: 20 INJECTION, SOLUTION EPIDURAL; INFILTRATION; INTRACAUDAL; PERINEURAL at 11:05

## 2021-01-01 RX ADMIN — EPOETIN ALFA-EPBX 20000 UNITS: 20000 INJECTION, SOLUTION INTRAVENOUS; SUBCUTANEOUS at 11:03

## 2021-01-01 RX ADMIN — FILGRASTIM 480 MCG: 480 INJECTION, SOLUTION INTRAVENOUS; SUBCUTANEOUS at 11:03

## 2021-01-01 RX ADMIN — FAMOTIDINE 20 MG: 10 INJECTION INTRAVENOUS at 02:01

## 2021-01-01 RX ADMIN — POTASSIUM CHLORIDE 20 MEQ: 20 TABLET, EXTENDED RELEASE ORAL at 06:05

## 2021-01-01 RX ADMIN — MORPHINE SULFATE 4 MG: 4 INJECTION, SOLUTION INTRAMUSCULAR; INTRAVENOUS at 09:06

## 2021-01-01 RX ADMIN — ACETAMINOPHEN 650 MG: 325 TABLET ORAL at 10:01

## 2021-01-01 RX ADMIN — SUCRALFATE 1 G: 1 TABLET ORAL at 05:06

## 2021-01-01 RX ADMIN — METOCLOPRAMIDE 10 MG: 5 INJECTION, SOLUTION INTRAMUSCULAR; INTRAVENOUS at 09:05

## 2021-01-01 RX ADMIN — SODIUM CHLORIDE, PRESERVATIVE FREE 10 ML: 5 INJECTION INTRAVENOUS at 12:07

## 2021-01-01 RX ADMIN — HYDROMORPHONE HYDROCHLORIDE 1 MG: 1 INJECTION, SOLUTION INTRAMUSCULAR; INTRAVENOUS; SUBCUTANEOUS at 01:06

## 2021-01-01 RX ADMIN — SUCRALFATE 1 G: 1 TABLET ORAL at 06:05

## 2021-01-01 RX ADMIN — SUCCINYLCHOLINE CHLORIDE 120 MG: 20 INJECTION, SOLUTION INTRAMUSCULAR; INTRAVENOUS at 02:05

## 2021-01-01 RX ADMIN — IOHEXOL 100 ML: 350 INJECTION, SOLUTION INTRAVENOUS at 08:01

## 2021-01-01 RX ADMIN — FUROSEMIDE 20 MG: 10 INJECTION, SOLUTION INTRAVENOUS at 01:02

## 2021-01-01 RX ADMIN — DEXAMETHASONE SODIUM PHOSPHATE 10 MG: 4 INJECTION, SOLUTION INTRA-ARTICULAR; INTRALESIONAL; INTRAMUSCULAR; INTRAVENOUS; SOFT TISSUE at 11:04

## 2021-01-01 RX ADMIN — EPLERENONE 50 MG: 25 TABLET, FILM COATED ORAL at 09:06

## 2021-01-01 RX ADMIN — MORPHINE SULFATE 4 MG: 4 INJECTION, SOLUTION INTRAMUSCULAR; INTRAVENOUS at 08:06

## 2021-01-01 RX ADMIN — METOCLOPRAMIDE: 5 INJECTION, SOLUTION INTRAMUSCULAR; INTRAVENOUS at 06:06

## 2021-01-01 RX ADMIN — HYDRALAZINE HYDROCHLORIDE 50 MG: 25 TABLET, FILM COATED ORAL at 08:06

## 2021-01-01 RX ADMIN — MORPHINE SULFATE 2 MG: 2 INJECTION, SOLUTION INTRAMUSCULAR; INTRAVENOUS at 03:05

## 2021-01-01 RX ADMIN — SIMETHICONE 80 MG: 20 SUSPENSION ORAL at 08:05

## 2021-01-01 RX ADMIN — ONDANSETRON 4 MG: 2 INJECTION INTRAMUSCULAR; INTRAVENOUS at 03:06

## 2021-01-01 RX ADMIN — HYDROMORPHONE HYDROCHLORIDE 0.2 MG: 1 INJECTION, SOLUTION INTRAMUSCULAR; INTRAVENOUS; SUBCUTANEOUS at 03:05

## 2021-01-01 RX ADMIN — HEPARIN 500 UNITS: 100 SYRINGE at 02:08

## 2021-01-01 RX ADMIN — LISINOPRIL 20 MG: 20 TABLET ORAL at 09:06

## 2021-01-01 RX ADMIN — SENNOSIDES AND DOCUSATE SODIUM 2 TABLET: 8.6; 5 TABLET ORAL at 10:06

## 2021-01-01 RX ADMIN — FILGRASTIM 480 MCG: 480 INJECTION, SOLUTION INTRAVENOUS; SUBCUTANEOUS at 10:02

## 2021-01-01 RX ADMIN — ENOXAPARIN SODIUM 40 MG: 40 INJECTION SUBCUTANEOUS at 05:06

## 2021-01-01 RX ADMIN — MORPHINE SULFATE 4 MG: 4 INJECTION, SOLUTION INTRAMUSCULAR; INTRAVENOUS at 12:06

## 2021-01-01 RX ADMIN — FENTANYL TRANSDERMAL 2 PATCH: 25 PATCH, EXTENDED RELEASE TRANSDERMAL at 04:06

## 2021-01-01 RX ADMIN — MAGNESIUM SULFATE 4 G: 2 INJECTION INTRAVENOUS at 08:05

## 2021-01-01 RX ADMIN — MORPHINE SULFATE 4 MG: 4 INJECTION, SOLUTION INTRAMUSCULAR; INTRAVENOUS at 10:05

## 2021-01-01 RX ADMIN — ONDANSETRON 16 MG: 2 INJECTION INTRAMUSCULAR; INTRAVENOUS at 11:03

## 2021-01-01 RX ADMIN — LEUCOVORIN CALCIUM 740 MG: 350 INJECTION, POWDER, LYOPHILIZED, FOR SOLUTION INTRAMUSCULAR; INTRAVENOUS at 12:06

## 2021-01-01 RX ADMIN — SODIUM CHLORIDE: 0.9 INJECTION, SOLUTION INTRAVENOUS at 01:01

## 2021-01-01 RX ADMIN — FENTANYL CITRATE 50 MCG: 50 INJECTION INTRAMUSCULAR; INTRAVENOUS at 02:05

## 2021-01-01 RX ADMIN — SODIUM CHLORIDE: 0.9 INJECTION, SOLUTION INTRAVENOUS at 11:03

## 2021-01-01 RX ADMIN — METOCLOPRAMIDE 10 MG: 5 INJECTION, SOLUTION INTRAMUSCULAR; INTRAVENOUS at 05:05

## 2021-01-01 RX ADMIN — MORPHINE SULFATE 4 MG: 4 INJECTION, SOLUTION INTRAMUSCULAR; INTRAVENOUS at 03:05

## 2021-01-01 RX ADMIN — SODIUM CHLORIDE, PRESERVATIVE FREE 10 ML: 5 INJECTION INTRAVENOUS at 12:06

## 2021-01-01 RX ADMIN — IRINOTECAN HYDROCHLORIDE 232 MG: 20 INJECTION, SOLUTION INTRAVENOUS at 01:06

## 2021-01-01 RX ADMIN — EPOETIN ALFA-EPBX 20000 UNITS: 20000 INJECTION, SOLUTION INTRAVENOUS; SUBCUTANEOUS at 03:01

## 2021-01-01 RX ADMIN — ACETAMINOPHEN 650 MG: 325 TABLET ORAL at 10:04

## 2021-01-01 RX ADMIN — DIPHENHYDRAMINE HYDROCHLORIDE 25 MG: 50 INJECTION INTRAMUSCULAR; INTRAVENOUS at 12:03

## 2021-01-01 RX ADMIN — DIAZEPAM 5 MG: 5 TABLET ORAL at 11:05

## 2021-01-01 RX ADMIN — DEXAMETHASONE SODIUM PHOSPHATE 10 MG: 4 INJECTION, SOLUTION INTRA-ARTICULAR; INTRALESIONAL; INTRAMUSCULAR; INTRAVENOUS; SOFT TISSUE at 11:02

## 2021-01-01 RX ADMIN — GEMCITABINE HYDROCHLORIDE 2000 MG: 2 INJECTION, SOLUTION INTRAVENOUS at 01:01

## 2021-01-01 RX ADMIN — FAMOTIDINE 20 MG: 10 INJECTION INTRAVENOUS at 10:01

## 2021-01-01 RX ADMIN — EPOETIN ALFA-EPBX 20000 UNITS: 20000 INJECTION, SOLUTION INTRAVENOUS; SUBCUTANEOUS at 11:04

## 2021-01-01 RX ADMIN — DEXAMETHASONE SODIUM PHOSPHATE 10 MG: 4 INJECTION, SOLUTION INTRA-ARTICULAR; INTRALESIONAL; INTRAMUSCULAR; INTRAVENOUS; SOFT TISSUE at 12:03

## 2021-01-01 RX ADMIN — SODIUM CHLORIDE: 0.9 INJECTION, SOLUTION INTRAVENOUS at 08:05

## 2021-01-01 RX ADMIN — SIMETHICONE 80 MG: 20 SUSPENSION ORAL at 09:05

## 2021-01-01 RX ADMIN — SENNOSIDES AND DOCUSATE SODIUM 2 TABLET: 8.6; 5 TABLET ORAL at 09:06

## 2021-01-01 RX ADMIN — METOCLOPRAMIDE: 5 INJECTION, SOLUTION INTRAMUSCULAR; INTRAVENOUS at 05:06

## 2021-01-01 RX ADMIN — LEVOTHYROXINE SODIUM 25 MCG: 25 TABLET ORAL at 08:05

## 2021-01-01 RX ADMIN — PROPOFOL 50 MG: 10 INJECTION, EMULSION INTRAVENOUS at 12:05

## 2021-01-01 RX ADMIN — DIAZEPAM 5 MG: 5 TABLET ORAL at 08:05

## 2021-01-01 RX ADMIN — FENTANYL TRANSDERMAL 2 PATCH: 25 PATCH, EXTENDED RELEASE TRANSDERMAL at 03:05

## 2021-01-01 RX ADMIN — ONDANSETRON 16 MG: 2 INJECTION INTRAMUSCULAR; INTRAVENOUS at 12:02

## 2021-01-01 RX ADMIN — DEXTROSE: 5 SOLUTION INTRAVENOUS at 08:06

## 2021-01-01 RX ADMIN — GEMCITABINE HYDROCHLORIDE 2010 MG: 2 INJECTION, SOLUTION INTRAVENOUS at 04:01

## 2021-01-01 RX ADMIN — DEXAMETHASONE SODIUM PHOSPHATE 10 MG: 4 INJECTION, SOLUTION INTRA-ARTICULAR; INTRALESIONAL; INTRAMUSCULAR; INTRAVENOUS; SOFT TISSUE at 01:01

## 2021-01-01 RX ADMIN — SODIUM CHLORIDE, SODIUM LACTATE, POTASSIUM CHLORIDE, AND CALCIUM CHLORIDE: .6; .31; .03; .02 INJECTION, SOLUTION INTRAVENOUS at 02:05

## 2021-01-01 RX ADMIN — ONDANSETRON 4 MG: 2 INJECTION INTRAMUSCULAR; INTRAVENOUS at 09:06

## 2021-01-01 RX ADMIN — POTASSIUM BICARBONATE 50 MEQ: 977.5 TABLET, EFFERVESCENT ORAL at 03:06

## 2021-01-01 RX ADMIN — HYDRALAZINE HYDROCHLORIDE 50 MG: 25 TABLET, FILM COATED ORAL at 10:06

## 2021-01-01 RX ADMIN — LEUCOVORIN CALCIUM 740 MG: 350 INJECTION, POWDER, LYOPHILIZED, FOR SOLUTION INTRAMUSCULAR; INTRAVENOUS at 10:06

## 2021-01-01 RX ADMIN — GEMCITABINE HYDROCHLORIDE 2010 MG: 2 INJECTION, SOLUTION INTRAVENOUS at 01:03

## 2021-01-01 RX ADMIN — FAMOTIDINE 20 MG: 10 INJECTION, SOLUTION INTRAVENOUS at 11:03

## 2021-01-01 RX ADMIN — LISINOPRIL 20 MG: 20 TABLET ORAL at 10:06

## 2021-01-01 RX ADMIN — METHYLNALTREXONE BROMIDE 12 MG: 12 INJECTION, SOLUTION SUBCUTANEOUS at 08:05

## 2021-01-01 RX ADMIN — PACLITAXEL 250 MG: 100 INJECTION, POWDER, LYOPHILIZED, FOR SUSPENSION INTRAVENOUS at 02:01

## 2021-01-01 RX ADMIN — ATROPINE SULFATE 0.4 MG: 0.4 INJECTION, SOLUTION INTRAMUSCULAR; INTRAVENOUS; SUBCUTANEOUS at 12:06

## 2021-01-01 RX ADMIN — PHENYLEPHRINE HYDROCHLORIDE 200 MCG: 10 INJECTION INTRAVENOUS at 12:05

## 2021-01-01 RX ADMIN — ONDANSETRON 16 MG: 2 INJECTION INTRAMUSCULAR; INTRAVENOUS at 11:02

## 2021-01-01 RX ADMIN — SUCRALFATE 1 G: 1 TABLET ORAL at 10:05

## 2021-01-01 RX ADMIN — FAMOTIDINE 20 MG: 10 INJECTION INTRAVENOUS at 11:03

## 2021-01-01 RX ADMIN — LIDOCAINE HYDROCHLORIDE 60 MG: 20 INJECTION, SOLUTION EPIDURAL; INFILTRATION; INTRACAUDAL; PERINEURAL at 01:05

## 2021-01-01 RX ADMIN — MORPHINE SULFATE 2 MG: 2 INJECTION, SOLUTION INTRAMUSCULAR; INTRAVENOUS at 04:05

## 2021-01-01 RX ADMIN — ACETAMINOPHEN 650 MG: 325 TABLET ORAL at 11:03

## 2021-01-01 RX ADMIN — OXYCODONE HYDROCHLORIDE 5 MG: 5 TABLET ORAL at 05:06

## 2021-01-01 RX ADMIN — ONDANSETRON 4 MG: 2 INJECTION INTRAMUSCULAR; INTRAVENOUS at 01:05

## 2021-01-01 RX ADMIN — MORPHINE SULFATE 2 MG: 2 INJECTION, SOLUTION INTRAMUSCULAR; INTRAVENOUS at 12:05

## 2021-01-01 RX ADMIN — SUCRALFATE 1 G: 1 TABLET ORAL at 04:06

## 2021-01-01 RX ADMIN — DIPHENHYDRAMINE HYDROCHLORIDE 25 MG: 50 INJECTION INTRAMUSCULAR; INTRAVENOUS at 02:01

## 2021-01-01 RX ADMIN — FENTANYL TRANSDERMAL 2 PATCH: 25 PATCH, EXTENDED RELEASE TRANSDERMAL at 07:05

## 2021-01-01 RX ADMIN — MORPHINE SULFATE 2 MG: 2 INJECTION, SOLUTION INTRAMUSCULAR; INTRAVENOUS at 05:05

## 2021-01-01 RX ADMIN — ONDANSETRON 4 MG: 2 INJECTION INTRAMUSCULAR; INTRAVENOUS at 11:05

## 2021-01-01 RX ADMIN — ACETAMINOPHEN 650 MG: 325 TABLET ORAL at 01:01

## 2021-01-01 RX ADMIN — LIDOCAINE HYDROCHLORIDE 5 ML: 20 JELLY TOPICAL at 12:05

## 2021-01-01 RX ADMIN — DEXTROSE MONOHYDRATE, SODIUM CHLORIDE, AND POTASSIUM CHLORIDE: 50; 4.5; 2.98 INJECTION, SOLUTION INTRAVENOUS at 01:05

## 2021-01-01 RX ADMIN — DEXTROSE MONOHYDRATE, SODIUM CHLORIDE, AND POTASSIUM CHLORIDE: 50; 4.5; 2.98 INJECTION, SOLUTION INTRAVENOUS at 08:05

## 2021-01-01 RX ADMIN — PANTOPRAZOLE SODIUM 40 MG: 40 INJECTION, POWDER, LYOPHILIZED, FOR SOLUTION INTRAVENOUS at 04:05

## 2021-01-01 RX ADMIN — FENTANYL CITRATE 50 MCG: 50 INJECTION INTRAMUSCULAR; INTRAVENOUS at 01:05

## 2021-01-01 RX ADMIN — OXALIPLATIN 120 MG: 100 INJECTION, SOLUTION, CONCENTRATE INTRAVENOUS at 10:06

## 2021-01-01 RX ADMIN — SODIUM CHLORIDE 500 ML: 0.9 INJECTION, SOLUTION INTRAVENOUS at 01:03

## 2021-01-01 RX ADMIN — EZETIMIBE 10 MG: 10 TABLET ORAL at 11:05

## 2021-01-01 RX ADMIN — SUCRALFATE 1 G: 1 TABLET ORAL at 09:06

## 2021-01-01 RX ADMIN — SIMETHICONE 80 MG: 20 SUSPENSION ORAL at 01:05

## 2021-01-01 RX ADMIN — OXALIPLATIN 120 MG: 5 INJECTION, SOLUTION INTRAVENOUS at 08:06

## 2021-01-01 RX ADMIN — Medication 296 ML: at 04:05

## 2021-01-01 RX ADMIN — HYDRALAZINE HYDROCHLORIDE 10 MG: 20 INJECTION INTRAMUSCULAR; INTRAVENOUS at 08:05

## 2021-01-01 RX ADMIN — MORPHINE SULFATE 4 MG: 4 INJECTION, SOLUTION INTRAMUSCULAR; INTRAVENOUS at 11:05

## 2021-01-01 RX ADMIN — POLYETHYLENE GLYCOL 3350 340 G: 17 POWDER, FOR SOLUTION ORAL at 06:06

## 2021-01-01 RX ADMIN — MORPHINE SULFATE 4 MG: 4 INJECTION, SOLUTION INTRAMUSCULAR; INTRAVENOUS at 06:06

## 2021-01-01 RX ADMIN — METOCLOPRAMIDE 10 MG: 5 INJECTION, SOLUTION INTRAMUSCULAR; INTRAVENOUS at 02:05

## 2021-01-01 RX ADMIN — ONDANSETRON 16 MG: 2 INJECTION INTRAMUSCULAR; INTRAVENOUS at 02:01

## 2021-01-01 RX ADMIN — ATROPINE SULFATE 0.4 MG: 0.4 INJECTION, SOLUTION INTRAMUSCULAR; INTRAVENOUS; SUBCUTANEOUS at 10:06

## 2021-01-01 RX ADMIN — GEMCITABINE HYDROCHLORIDE 1980 MG: 2 INJECTION, SOLUTION INTRAVENOUS at 01:03

## 2021-01-01 RX ADMIN — MORPHINE SULFATE 2 MG: 2 INJECTION, SOLUTION INTRAMUSCULAR; INTRAVENOUS at 10:05

## 2021-01-01 RX ADMIN — LABETALOL HYDROCHLORIDE 10 MG: 5 INJECTION INTRAVENOUS at 11:05

## 2021-01-01 RX ADMIN — PACLITAXEL 250 MG: 100 INJECTION, POWDER, LYOPHILIZED, FOR SUSPENSION INTRAVENOUS at 01:03

## 2021-01-01 RX ADMIN — SODIUM CHLORIDE, SODIUM LACTATE, POTASSIUM CHLORIDE, AND CALCIUM CHLORIDE: .6; .31; .03; .02 INJECTION, SOLUTION INTRAVENOUS at 01:05

## 2021-01-01 RX ADMIN — HEPARIN 500 UNITS: 100 SYRINGE at 12:06

## 2021-01-01 RX ADMIN — METHYLNALTREXONE BROMIDE 8 MG: 12 INJECTION, SOLUTION SUBCUTANEOUS at 10:06

## 2021-01-01 RX ADMIN — MORPHINE SULFATE 4 MG: 4 INJECTION, SOLUTION INTRAMUSCULAR; INTRAVENOUS at 12:05

## 2021-01-01 RX ADMIN — HYDRALAZINE HYDROCHLORIDE 10 MG: 20 INJECTION, SOLUTION INTRAMUSCULAR; INTRAVENOUS at 07:05

## 2021-01-01 RX ADMIN — FILGRASTIM 480 MCG: 480 INJECTION, SOLUTION INTRAVENOUS; SUBCUTANEOUS at 10:03

## 2021-01-01 RX ADMIN — SODIUM CHLORIDE 1000 ML: 0.9 INJECTION, SOLUTION INTRAVENOUS at 01:06

## 2021-01-01 RX ADMIN — MORPHINE SULFATE 2 MG: 2 INJECTION, SOLUTION INTRAMUSCULAR; INTRAVENOUS at 11:06

## 2021-01-01 RX ADMIN — LISINOPRIL 20 MG: 20 TABLET ORAL at 04:05

## 2021-01-01 RX ADMIN — MORPHINE SULFATE 4 MG: 4 INJECTION, SOLUTION INTRAMUSCULAR; INTRAVENOUS at 04:05

## 2021-01-01 RX ADMIN — LEVOTHYROXINE SODIUM ANHYDROUS 25 MCG: 100 INJECTION, POWDER, LYOPHILIZED, FOR SOLUTION INTRAVENOUS at 11:05

## 2021-01-01 RX ADMIN — GEMCITABINE HYDROCHLORIDE 1950 MG: 2 INJECTION, SOLUTION INTRAVENOUS at 02:03

## 2021-01-01 RX ADMIN — OXYCODONE HYDROCHLORIDE 5 MG: 5 TABLET ORAL at 04:06

## 2021-01-01 RX ADMIN — OXYCODONE HYDROCHLORIDE 5 MG: 5 TABLET ORAL at 02:05

## 2021-01-01 RX ADMIN — SIMETHICONE 80 MG: 20 SUSPENSION ORAL at 04:05

## 2021-01-01 RX ADMIN — HEPARIN 500 UNITS: 100 SYRINGE at 02:05

## 2021-01-01 RX ADMIN — SODIUM CHLORIDE 10 ML: 9 INJECTION INTRAMUSCULAR; INTRAVENOUS; SUBCUTANEOUS at 02:04

## 2021-01-01 RX ADMIN — SODIUM CHLORIDE 500 ML: 0.9 INJECTION, SOLUTION INTRAVENOUS at 01:08

## 2021-01-01 RX ADMIN — MORPHINE SULFATE 2 MG: 2 INJECTION, SOLUTION INTRAMUSCULAR; INTRAVENOUS at 12:06

## 2021-01-01 RX ADMIN — DIAZEPAM 5 MG: 5 TABLET ORAL at 03:05

## 2021-01-01 RX ADMIN — PANTOPRAZOLE SODIUM 40 MG: 40 INJECTION, POWDER, LYOPHILIZED, FOR SOLUTION INTRAVENOUS at 01:06

## 2021-01-01 RX ADMIN — PACLITAXEL 250 MG: 100 INJECTION, POWDER, LYOPHILIZED, FOR SUSPENSION INTRAVENOUS at 01:02

## 2021-01-01 RX ADMIN — HYDRALAZINE HYDROCHLORIDE 50 MG: 25 TABLET, FILM COATED ORAL at 03:06

## 2021-01-01 RX ADMIN — MORPHINE SULFATE 4 MG: 4 INJECTION, SOLUTION INTRAMUSCULAR; INTRAVENOUS at 09:05

## 2021-01-01 RX ADMIN — HYDRALAZINE HYDROCHLORIDE 50 MG: 25 TABLET, FILM COATED ORAL at 04:06

## 2021-01-01 RX ADMIN — DEXTROSE MONOHYDRATE, SODIUM CHLORIDE, AND POTASSIUM CHLORIDE: 50; 4.5; 2.98 INJECTION, SOLUTION INTRAVENOUS at 07:05

## 2021-01-01 RX ADMIN — FINASTERIDE 5 MG: 5 TABLET, FILM COATED ORAL at 11:05

## 2021-01-01 RX ADMIN — DIPHENHYDRAMINE HYDROCHLORIDE 12.5 MG: 50 INJECTION INTRAMUSCULAR; INTRAVENOUS at 10:01

## 2021-01-01 RX ADMIN — DIAZEPAM 5 MG: 5 TABLET ORAL at 08:06

## 2021-01-01 RX ADMIN — FLUOROURACIL 3700 MG: 50 INJECTION, SOLUTION INTRAVENOUS at 02:06

## 2021-01-01 RX ADMIN — IRINOTECAN HYDROCHLORIDE 232 MG: 20 INJECTION, SOLUTION INTRAVENOUS at 11:06

## 2021-01-01 RX ADMIN — POLYETHYLENE GLYCOL 3350 17 G: 17 POWDER, FOR SOLUTION ORAL at 09:06

## 2021-01-01 RX ADMIN — DIAZEPAM 5 MG: 5 TABLET ORAL at 06:05

## 2021-01-01 RX ADMIN — PACLITAXEL 250 MG: 100 INJECTION, POWDER, LYOPHILIZED, FOR SUSPENSION INTRAVENOUS at 12:03

## 2021-01-01 RX ADMIN — HYDRALAZINE HYDROCHLORIDE 50 MG: 25 TABLET, FILM COATED ORAL at 02:05

## 2021-01-01 RX ADMIN — DIPHENHYDRAMINE HYDROCHLORIDE 12.5 MG: 50 INJECTION INTRAMUSCULAR; INTRAVENOUS at 02:06

## 2021-01-01 RX ADMIN — MAGNESIUM SULFATE 2 G: 2 INJECTION INTRAVENOUS at 02:05

## 2021-01-01 RX ADMIN — DIATRIZOATE MEGLUMINE AND DIATRIZOATE SODIUM 120 ML: 660; 100 LIQUID ORAL; RECTAL at 10:05

## 2021-01-01 RX ADMIN — PROPOFOL 30 MG: 10 INJECTION, EMULSION INTRAVENOUS at 12:05

## 2021-01-01 RX ADMIN — FENTANYL TRANSDERMAL 1 PATCH: 25 PATCH, EXTENDED RELEASE TRANSDERMAL at 07:05

## 2021-01-01 RX ADMIN — POLYETHYLENE GLYCOL 3350 17 G: 17 POWDER, FOR SOLUTION ORAL at 10:06

## 2021-01-01 RX ADMIN — DEXAMETHASONE SODIUM PHOSPHATE 10 MG: 4 INJECTION, SOLUTION INTRA-ARTICULAR; INTRALESIONAL; INTRAMUSCULAR; INTRAVENOUS; SOFT TISSUE at 11:01

## 2021-01-01 RX ADMIN — FAMOTIDINE 20 MG: 10 INJECTION INTRAVENOUS at 01:03

## 2021-01-01 RX ADMIN — ONDANSETRON 4 MG: 2 INJECTION INTRAMUSCULAR; INTRAVENOUS at 01:06

## 2021-01-01 RX ADMIN — EPOETIN ALFA-EPBX 20000 UNITS: 20000 INJECTION, SOLUTION INTRAVENOUS; SUBCUTANEOUS at 12:02

## 2021-01-01 RX ADMIN — METOCLOPRAMIDE: 5 INJECTION, SOLUTION INTRAMUSCULAR; INTRAVENOUS at 09:06

## 2021-01-01 RX ADMIN — PALONOSETRON HYDROCHLORIDE 0.25 MG: 0.25 INJECTION, SOLUTION INTRAVENOUS at 10:06

## 2021-01-19 PROBLEM — K21.9 GASTROESOPHAGEAL REFLUX DISEASE WITHOUT ESOPHAGITIS: Status: ACTIVE | Noted: 2021-01-01

## 2021-05-24 PROBLEM — E27.40 HYPOALDOSTERONISM: Status: ACTIVE | Noted: 2021-01-01

## 2021-05-24 PROBLEM — E87.6 HYPOKALEMIA: Status: ACTIVE | Noted: 2021-01-01

## 2021-05-24 PROBLEM — K56.609 SBO (SMALL BOWEL OBSTRUCTION): Status: ACTIVE | Noted: 2021-01-01

## 2021-07-07 PROBLEM — E86.0 DEHYDRATION: Status: ACTIVE | Noted: 2021-01-01

## 2021-08-05 PROBLEM — Z71.89 COUNSELING REGARDING ADVANCED CARE PLANNING AND GOALS OF CARE: Status: ACTIVE | Noted: 2021-01-01

## 2021-08-05 PROBLEM — G89.3 CANCER RELATED PAIN: Status: ACTIVE | Noted: 2021-01-01

## 2021-08-17 ENCOUNTER — PATIENT MESSAGE (OUTPATIENT)
Dept: HEMATOLOGY/ONCOLOGY | Facility: CLINIC | Age: 71
End: 2021-08-17

## 2021-08-17 ENCOUNTER — TELEPHONE (OUTPATIENT)
Dept: FAMILY MEDICINE | Facility: CLINIC | Age: 71
End: 2021-08-17

## 2025-01-28 NOTE — TRANSFER OF CARE
"Anesthesia Transfer of Care Note    Patient: Michael Watson    Procedure(s) Performed: Procedure(s) (LRB):  ULTRASOUND, UPPER GI TRACT, ENDOSCOPIC (N/A)    Patient location: PACU    Anesthesia Type: MAC    Transport from OR: Transported from OR on 2-3 L/min O2 by NC with adequate spontaneous ventilation    Post pain: adequate analgesia    Post assessment: no apparent anesthetic complications    Post vital signs: stable    Level of consciousness: awake and alert    Nausea/Vomiting: no nausea/vomiting    Complications: none    Transfer of care protocol was followed      Last vitals:   Visit Vitals  BP (!) 148/68   Pulse 84   Temp (!) 31.7 °C (89 °F) (Oral)   Resp 16   Ht 5' 8" (1.727 m)   Wt 86.2 kg (190 lb)   SpO2 97%   BMI 28.89 kg/m²     "
Never